# Patient Record
Sex: FEMALE | Race: WHITE | NOT HISPANIC OR LATINO | URBAN - METROPOLITAN AREA
[De-identification: names, ages, dates, MRNs, and addresses within clinical notes are randomized per-mention and may not be internally consistent; named-entity substitution may affect disease eponyms.]

---

## 2017-09-13 ENCOUNTER — EMERGENCY (EMERGENCY)
Facility: HOSPITAL | Age: 67
LOS: 1 days | Discharge: HOME | End: 2017-09-13
Attending: EMERGENCY MEDICINE | Admitting: EMERGENCY MEDICINE
Payer: MEDICARE

## 2017-09-13 VITALS
SYSTOLIC BLOOD PRESSURE: 135 MMHG | HEART RATE: 88 BPM | OXYGEN SATURATION: 99 % | DIASTOLIC BLOOD PRESSURE: 84 MMHG | RESPIRATION RATE: 16 BRPM | TEMPERATURE: 98 F

## 2017-09-13 VITALS — HEART RATE: 100 BPM | DIASTOLIC BLOOD PRESSURE: 92 MMHG | SYSTOLIC BLOOD PRESSURE: 148 MMHG | RESPIRATION RATE: 18 BRPM

## 2017-09-13 LAB
APPEARANCE UR: CLEAR — SIGNIFICANT CHANGE UP
BILIRUB UR-MCNC: NEGATIVE — SIGNIFICANT CHANGE UP
COLOR SPEC: SIGNIFICANT CHANGE UP
DIFF PNL FLD: NEGATIVE — SIGNIFICANT CHANGE UP
GLUCOSE UR QL: NEGATIVE — SIGNIFICANT CHANGE UP
KETONES UR-MCNC: NEGATIVE — SIGNIFICANT CHANGE UP
LEUKOCYTE ESTERASE UR-ACNC: ABNORMAL
NITRITE UR-MCNC: NEGATIVE — SIGNIFICANT CHANGE UP
PH UR: 6 — SIGNIFICANT CHANGE UP (ref 5–8)
PROT UR-MCNC: NEGATIVE — SIGNIFICANT CHANGE UP
RBC CASTS # UR COMP ASSIST: SIGNIFICANT CHANGE UP /HPF (ref 0–2)
SP GR SPEC: 1.01 — SIGNIFICANT CHANGE UP (ref 1.01–1.02)
UROBILINOGEN FLD QL: NEGATIVE — SIGNIFICANT CHANGE UP
WBC UR QL: SIGNIFICANT CHANGE UP /HPF (ref 0–5)

## 2017-09-13 PROCEDURE — 81001 URINALYSIS AUTO W/SCOPE: CPT

## 2017-09-13 PROCEDURE — 99284 EMERGENCY DEPT VISIT MOD MDM: CPT

## 2017-09-13 PROCEDURE — 99283 EMERGENCY DEPT VISIT LOW MDM: CPT | Mod: 25

## 2017-09-13 PROCEDURE — 87086 URINE CULTURE/COLONY COUNT: CPT

## 2017-09-13 PROCEDURE — 93005 ELECTROCARDIOGRAM TRACING: CPT

## 2017-09-13 PROCEDURE — 87186 SC STD MICRODIL/AGAR DIL: CPT

## 2017-09-13 RX ORDER — CEPHALEXIN 500 MG
500 CAPSULE ORAL ONCE
Qty: 0 | Refills: 0 | Status: COMPLETED | OUTPATIENT
Start: 2017-09-13 | End: 2017-09-13

## 2017-09-13 RX ORDER — CEPHALEXIN 500 MG
1 CAPSULE ORAL
Qty: 14 | Refills: 0
Start: 2017-09-13 | End: 2017-09-20

## 2017-09-13 RX ADMIN — Medication 500 MILLIGRAM(S): at 19:25

## 2017-09-13 NOTE — ED PROVIDER NOTE - PROGRESS NOTE DETAILS
pt continues to state she has not complaints and wishes to go back to her facility at this time. she is ambulatory and A&Ox3 without complaint. she is found to have bacteruria and will rx keflex for her. will print rx after providing one dose here, as patient lives in assisted living and will need rx filled by their pharmacy. nonemergent ambulance called. patient is stable for d/c back to facility at this time. -Anupama Oropeza PA-C

## 2017-09-13 NOTE — ED PROVIDER NOTE - ATTENDING CONTRIBUTION TO CARE
67F hx alzheimers, paranoid schizophrenia presents from [facility] via EMS.  As per EMS, [facility] NP found pt tachycardic to 120s and diaphoretic.  However, pt asymptomatic and vitals normal as per EMS.  In ED, pt has no complaints.  Denies CP, SOB, or any other symptoms. VSS. 67F hx alzheimers, paranoid schizophrenia presents from [facility] via EMS.  As per EMS, [facility] NP found pt tachycardic to 120s and diaphoretic.  However, pt asymptomatic and vitals normal as per EMS.  In ED, pt has no complaints.  Denies CP, SOB, or any other symptoms. VSS.  well-appearing.  OP wnl, cta b/l, s1s2, soft ndnt.  frequent trips to bathroom.  will obtain ua/ucx, reassess.  tbd

## 2017-09-13 NOTE — ED ADULT NURSE NOTE - OBJECTIVE STATEMENT
Pt is a 66 yo female sent here from the assisted living for tachycardia as per the nurse there. Pt denies any complaints at this time and does not know why she was sent here. Pt denies any CP or SOB no N/V/D no cough fever or chills. EMS reports pts HR in the low 100s. She reports feeling well and would like to go back home. Pt has pmh of alzheimers, dementia and paranoid schizophrenia.

## 2017-09-13 NOTE — ED PROVIDER NOTE - CARE PLAN
Principal Discharge DX:	UTI (urinary tract infection)  Instructions for follow-up, activity and diet:	1. Take Keflex 500mg twice daily for UTI   2. Hydrate thoroughly   3. Obtain reevaluation by primary physician this week.   4. Return to ED for any new or worsening symptoms.

## 2017-09-13 NOTE — ED PROVIDER NOTE - MEDICAL DECISION MAKING DETAILS
66 y/o F pmhx dementia (though A&Ox3 here) and schizophrenia BIBEMS from assisted living for episode of tachycardia found when taking vitals. PE unremarkable for tachycardia; patient denies any complaints at this time, stating she feels well. Will obtain basic labs, ekg, urinalysis and continue to reassess

## 2017-09-13 NOTE — ED PROVIDER NOTE - PLAN OF CARE
1. Take Keflex 500mg twice daily for UTI   2. Hydrate thoroughly   3. Obtain reevaluation by primary physician this week.   4. Return to ED for any new or worsening symptoms.

## 2017-09-13 NOTE — ED PROVIDER NOTE - OBJECTIVE STATEMENT
68 y/o F pmhx alzheimer's dementia, paranoid schizophrenia, presenting sent by her assisted living facility for episode of tachycardia. Pt states that she feels well, does not have any complaints and 'does not know why they asked her to come in.' Reports they were concerned when they checked her vital signs that her 'heart rate was in the 120s' and called the ambulance. Pt denies any chest pain, shortness of breath, recent illness, fever, chills, dysuria, abdominal pain, nausea, vomiting, diarrhea or any other concerns. States that she wants to go home at this time.

## 2017-09-16 NOTE — ED POST DISCHARGE NOTE - RESULT SUMMARY
UCx + 50-99,000 CFU/mL E.Coli; Pt given rx for keflex and culture is pansensitive to cephalosporins. - Corinne Fischer PA-C

## 2018-02-16 ENCOUNTER — INPATIENT (INPATIENT)
Facility: HOSPITAL | Age: 68
LOS: 5 days | Discharge: HOSPICE | DRG: 871 | End: 2018-02-22
Attending: INTERNAL MEDICINE | Admitting: INTERNAL MEDICINE
Payer: MEDICARE

## 2018-02-16 VITALS
DIASTOLIC BLOOD PRESSURE: 79 MMHG | TEMPERATURE: 98 F | OXYGEN SATURATION: 99 % | SYSTOLIC BLOOD PRESSURE: 131 MMHG | RESPIRATION RATE: 20 BRPM | HEART RATE: 101 BPM

## 2018-02-16 DIAGNOSIS — J96.01 ACUTE RESPIRATORY FAILURE WITH HYPOXIA: ICD-10-CM

## 2018-02-16 DIAGNOSIS — E03.9 HYPOTHYROIDISM, UNSPECIFIED: ICD-10-CM

## 2018-02-16 DIAGNOSIS — F20.0 PARANOID SCHIZOPHRENIA: ICD-10-CM

## 2018-02-16 DIAGNOSIS — R06.03 ACUTE RESPIRATORY DISTRESS: ICD-10-CM

## 2018-02-16 DIAGNOSIS — J20.5 ACUTE BRONCHITIS DUE TO RESPIRATORY SYNCYTIAL VIRUS: ICD-10-CM

## 2018-02-16 LAB
ALBUMIN SERPL ELPH-MCNC: 4 G/DL — SIGNIFICANT CHANGE UP (ref 3.3–5)
ALP SERPL-CCNC: 88 U/L — SIGNIFICANT CHANGE UP (ref 40–120)
ALT FLD-CCNC: 19 U/L RC — SIGNIFICANT CHANGE UP (ref 10–45)
AMPHET UR-MCNC: NEGATIVE — SIGNIFICANT CHANGE UP
ANION GAP SERPL CALC-SCNC: 16 MMOL/L — SIGNIFICANT CHANGE UP (ref 5–17)
APAP SERPL-MCNC: <15 UG/ML — SIGNIFICANT CHANGE UP (ref 10–30)
APPEARANCE UR: CLEAR — SIGNIFICANT CHANGE UP
APTT BLD: 30 SEC — SIGNIFICANT CHANGE UP (ref 27.5–37.4)
AST SERPL-CCNC: 21 U/L — SIGNIFICANT CHANGE UP (ref 10–40)
BARBITURATES UR SCN-MCNC: NEGATIVE — SIGNIFICANT CHANGE UP
BASE EXCESS BLDV CALC-SCNC: -0.5 MMOL/L — SIGNIFICANT CHANGE UP (ref -2–2)
BASOPHILS # BLD AUTO: 0 K/UL — SIGNIFICANT CHANGE UP (ref 0–0.2)
BASOPHILS NFR BLD AUTO: 0.6 % — SIGNIFICANT CHANGE UP (ref 0–2)
BENZODIAZ UR-MCNC: NEGATIVE — SIGNIFICANT CHANGE UP
BILIRUB SERPL-MCNC: 0.2 MG/DL — SIGNIFICANT CHANGE UP (ref 0.2–1.2)
BILIRUB UR-MCNC: NEGATIVE — SIGNIFICANT CHANGE UP
BUN SERPL-MCNC: 15 MG/DL — SIGNIFICANT CHANGE UP (ref 7–23)
CA-I SERPL-SCNC: 1.21 MMOL/L — SIGNIFICANT CHANGE UP (ref 1.12–1.3)
CALCIUM SERPL-MCNC: 9 MG/DL — SIGNIFICANT CHANGE UP (ref 8.4–10.5)
CHLORIDE BLDV-SCNC: 107 MMOL/L — SIGNIFICANT CHANGE UP (ref 96–108)
CHLORIDE SERPL-SCNC: 104 MMOL/L — SIGNIFICANT CHANGE UP (ref 96–108)
CO2 BLDV-SCNC: 24 MMOL/L — SIGNIFICANT CHANGE UP (ref 22–30)
CO2 SERPL-SCNC: 21 MMOL/L — LOW (ref 22–31)
COCAINE METAB.OTHER UR-MCNC: NEGATIVE — SIGNIFICANT CHANGE UP
COLOR SPEC: SIGNIFICANT CHANGE UP
CREAT SERPL-MCNC: 0.8 MG/DL — SIGNIFICANT CHANGE UP (ref 0.5–1.3)
DIFF PNL FLD: NEGATIVE — SIGNIFICANT CHANGE UP
EOSINOPHIL # BLD AUTO: 0 K/UL — SIGNIFICANT CHANGE UP (ref 0–0.5)
EOSINOPHIL NFR BLD AUTO: 0.4 % — SIGNIFICANT CHANGE UP (ref 0–6)
GAS PNL BLDV: 141 MMOL/L — SIGNIFICANT CHANGE UP (ref 136–145)
GAS PNL BLDV: SIGNIFICANT CHANGE UP
GAS PNL BLDV: SIGNIFICANT CHANGE UP
GLUCOSE BLDV-MCNC: 111 MG/DL — HIGH (ref 70–99)
GLUCOSE SERPL-MCNC: 113 MG/DL — HIGH (ref 70–99)
GLUCOSE UR QL: NEGATIVE — SIGNIFICANT CHANGE UP
HCO3 BLDV-SCNC: 23 MMOL/L — SIGNIFICANT CHANGE UP (ref 21–29)
HCT VFR BLD CALC: 41.5 % — SIGNIFICANT CHANGE UP (ref 34.5–45)
HCT VFR BLDA CALC: 42 % — SIGNIFICANT CHANGE UP (ref 39–50)
HGB BLD CALC-MCNC: 13.8 G/DL — SIGNIFICANT CHANGE UP (ref 11.5–15.5)
HGB BLD-MCNC: 13.7 G/DL — SIGNIFICANT CHANGE UP (ref 11.5–15.5)
INR BLD: 1.05 RATIO — SIGNIFICANT CHANGE UP (ref 0.88–1.16)
KETONES UR-MCNC: NEGATIVE — SIGNIFICANT CHANGE UP
LACTATE BLDV-MCNC: 1.6 MMOL/L — SIGNIFICANT CHANGE UP (ref 0.7–2)
LEUKOCYTE ESTERASE UR-ACNC: NEGATIVE — SIGNIFICANT CHANGE UP
LYMPHOCYTES # BLD AUTO: 0.7 K/UL — LOW (ref 1–3.3)
LYMPHOCYTES # BLD AUTO: 11.2 % — LOW (ref 13–44)
MCHC RBC-ENTMCNC: 30.8 PG — SIGNIFICANT CHANGE UP (ref 27–34)
MCHC RBC-ENTMCNC: 33.1 GM/DL — SIGNIFICANT CHANGE UP (ref 32–36)
MCV RBC AUTO: 92.9 FL — SIGNIFICANT CHANGE UP (ref 80–100)
METHADONE UR-MCNC: NEGATIVE — SIGNIFICANT CHANGE UP
MONOCYTES # BLD AUTO: 0.5 K/UL — SIGNIFICANT CHANGE UP (ref 0–0.9)
MONOCYTES NFR BLD AUTO: 8 % — SIGNIFICANT CHANGE UP (ref 2–14)
NEUTROPHILS # BLD AUTO: 5 K/UL — SIGNIFICANT CHANGE UP (ref 1.8–7.4)
NEUTROPHILS NFR BLD AUTO: 79.7 % — HIGH (ref 43–77)
NITRITE UR-MCNC: NEGATIVE — SIGNIFICANT CHANGE UP
OPIATES UR-MCNC: NEGATIVE — SIGNIFICANT CHANGE UP
OXYCODONE UR-MCNC: NEGATIVE — SIGNIFICANT CHANGE UP
PCO2 BLDV: 38 MMHG — SIGNIFICANT CHANGE UP (ref 35–50)
PCP SPEC-MCNC: SIGNIFICANT CHANGE UP
PCP UR-MCNC: NEGATIVE — SIGNIFICANT CHANGE UP
PH BLDV: 7.41 — SIGNIFICANT CHANGE UP (ref 7.35–7.45)
PH UR: 6.5 — SIGNIFICANT CHANGE UP (ref 5–8)
PLATELET # BLD AUTO: 253 K/UL — SIGNIFICANT CHANGE UP (ref 150–400)
PO2 BLDV: SIGNIFICANT CHANGE UP MMHG (ref 25–45)
POTASSIUM BLDV-SCNC: 3.9 MMOL/L — SIGNIFICANT CHANGE UP (ref 3.5–5)
POTASSIUM SERPL-MCNC: 3.8 MMOL/L — SIGNIFICANT CHANGE UP (ref 3.5–5.3)
POTASSIUM SERPL-SCNC: 3.8 MMOL/L — SIGNIFICANT CHANGE UP (ref 3.5–5.3)
PROT SERPL-MCNC: 7.4 G/DL — SIGNIFICANT CHANGE UP (ref 6–8.3)
PROT UR-MCNC: NEGATIVE — SIGNIFICANT CHANGE UP
PROTHROM AB SERPL-ACNC: 11.5 SEC — SIGNIFICANT CHANGE UP (ref 9.8–12.7)
RAPID RVP RESULT: DETECTED
RBC # BLD: 4.46 M/UL — SIGNIFICANT CHANGE UP (ref 3.8–5.2)
RBC # FLD: 13 % — SIGNIFICANT CHANGE UP (ref 10.3–14.5)
RSV RNA SPEC QL NAA+PROBE: DETECTED
SALICYLATES SERPL-MCNC: <2 MG/DL — LOW (ref 15–30)
SAO2 % BLDV: 89 % — HIGH (ref 67–88)
SODIUM SERPL-SCNC: 141 MMOL/L — SIGNIFICANT CHANGE UP (ref 135–145)
SP GR SPEC: 1.01 — SIGNIFICANT CHANGE UP (ref 1.01–1.02)
T3 SERPL-MCNC: 82 NG/DL — SIGNIFICANT CHANGE UP (ref 80–200)
T4 AB SER-ACNC: 8.3 UG/DL — SIGNIFICANT CHANGE UP (ref 4.6–12)
THC UR QL: NEGATIVE — SIGNIFICANT CHANGE UP
TROPONIN T SERPL-MCNC: <0.01 NG/ML — SIGNIFICANT CHANGE UP (ref 0–0.06)
TSH SERPL-MCNC: 2.68 UIU/ML — SIGNIFICANT CHANGE UP (ref 0.27–4.2)
UROBILINOGEN FLD QL: NEGATIVE — SIGNIFICANT CHANGE UP
WBC # BLD: 6.3 K/UL — SIGNIFICANT CHANGE UP (ref 3.8–10.5)
WBC # FLD AUTO: 6.3 K/UL — SIGNIFICANT CHANGE UP (ref 3.8–10.5)

## 2018-02-16 PROCEDURE — 99291 CRITICAL CARE FIRST HOUR: CPT | Mod: 25,GC

## 2018-02-16 PROCEDURE — 93010 ELECTROCARDIOGRAM REPORT: CPT

## 2018-02-16 PROCEDURE — 71045 X-RAY EXAM CHEST 1 VIEW: CPT | Mod: 26

## 2018-02-16 RX ORDER — HEPARIN SODIUM 5000 [USP'U]/ML
5000 INJECTION INTRAVENOUS; SUBCUTANEOUS EVERY 8 HOURS
Qty: 0 | Refills: 0 | Status: DISCONTINUED | OUTPATIENT
Start: 2018-02-16 | End: 2018-02-22

## 2018-02-16 RX ORDER — FAMOTIDINE 10 MG/ML
20 INJECTION INTRAVENOUS DAILY
Qty: 0 | Refills: 0 | Status: DISCONTINUED | OUTPATIENT
Start: 2018-02-16 | End: 2018-02-22

## 2018-02-16 RX ORDER — RANITIDINE HYDROCHLORIDE 150 MG/1
1 TABLET, FILM COATED ORAL
Qty: 0 | Refills: 0 | COMMUNITY

## 2018-02-16 RX ORDER — SODIUM CHLORIDE 9 MG/ML
3 INJECTION INTRAMUSCULAR; INTRAVENOUS; SUBCUTANEOUS ONCE
Qty: 0 | Refills: 0 | Status: COMPLETED | OUTPATIENT
Start: 2018-02-16 | End: 2018-02-16

## 2018-02-16 RX ORDER — LEVOTHYROXINE SODIUM 125 MCG
50 TABLET ORAL DAILY
Qty: 0 | Refills: 0 | Status: DISCONTINUED | OUTPATIENT
Start: 2018-02-16 | End: 2018-02-22

## 2018-02-16 RX ORDER — RISPERIDONE 4 MG/1
1 TABLET ORAL
Qty: 0 | Refills: 0 | Status: DISCONTINUED | OUTPATIENT
Start: 2018-02-16 | End: 2018-02-22

## 2018-02-16 RX ORDER — BUDESONIDE, MICRONIZED 100 %
0.5 POWDER (GRAM) MISCELLANEOUS
Qty: 0 | Refills: 0 | Status: DISCONTINUED | OUTPATIENT
Start: 2018-02-16 | End: 2018-02-22

## 2018-02-16 RX ORDER — ACETAMINOPHEN 500 MG
650 TABLET ORAL EVERY 6 HOURS
Qty: 0 | Refills: 0 | Status: DISCONTINUED | OUTPATIENT
Start: 2018-02-16 | End: 2018-02-22

## 2018-02-16 RX ORDER — IPRATROPIUM/ALBUTEROL SULFATE 18-103MCG
3 AEROSOL WITH ADAPTER (GRAM) INHALATION EVERY 6 HOURS
Qty: 0 | Refills: 0 | Status: DISCONTINUED | OUTPATIENT
Start: 2018-02-16 | End: 2018-02-22

## 2018-02-16 RX ADMIN — Medication 3 MILLILITER(S): at 23:46

## 2018-02-16 RX ADMIN — Medication 0.5 MILLIGRAM(S): at 21:32

## 2018-02-16 RX ADMIN — FAMOTIDINE 20 MILLIGRAM(S): 10 INJECTION INTRAVENOUS at 21:31

## 2018-02-16 RX ADMIN — SODIUM CHLORIDE 3 MILLILITER(S): 9 INJECTION INTRAMUSCULAR; INTRAVENOUS; SUBCUTANEOUS at 14:09

## 2018-02-16 RX ADMIN — HEPARIN SODIUM 5000 UNIT(S): 5000 INJECTION INTRAVENOUS; SUBCUTANEOUS at 21:30

## 2018-02-16 RX ADMIN — RISPERIDONE 1 MILLIGRAM(S): 4 TABLET ORAL at 21:33

## 2018-02-16 RX ADMIN — Medication 1 MILLIGRAM(S): at 21:31

## 2018-02-16 RX ADMIN — Medication 20 MILLIGRAM(S): at 21:29

## 2018-02-16 NOTE — ED PROVIDER NOTE - CARE PLAN
Principal Discharge DX:	Tachypnea Principal Discharge DX:	Respiratory distress Principal Discharge DX:	Respiratory distress  Secondary Diagnosis:	RSV infection

## 2018-02-16 NOTE — H&P ADULT - RS GEN PE MLT RESP DETAILS PC
airway patent/no chest wall tenderness/good air movement/no rales/breath sounds equal/no intercostal retractions/rhonchi/no wheezes

## 2018-02-16 NOTE — H&P ADULT - PROBLEM SELECTOR PLAN 2
RVP positive for RSV, not influenza  - d/c Tamiglu and treatment as above  - Droplet, contact isolation

## 2018-02-16 NOTE — H&P ADULT - HISTORY OF PRESENT ILLNESS
(history obtained from transfer note, patient and HCP-Tomas)-The patient is a 68 F (poor historian) with h/o Alzheimer's dementia, paranoid schizophrenia, hypothyroidism, GI bleed, PVD, ASHD sent from [facility] because she has been having nonproductive cough with SOB for the last few days. No c/o fever, chest pain.   As per HCP-Tmoas she visited her on 2/8 at [facility] and she was in her regular state of health. On 2/13/18 she was started on Tamiflu, likely for her symptoms but no documentation of Influenza.,

## 2018-02-16 NOTE — ED PROVIDER NOTE - MEDICAL DECISION MAKING DETAILS
68y Female PMH alzheimer's dementia, paranoid schizophrenia, presenting sent by her assisted living facility ([facility]) for tachypnea, no other symptoms, will assess RVP, labs with cardiac enzymes and D-dimer to r/o PE, Noninvasive positive pressure ventilation, reassess 68y Female PMH alzheimer's dementia, paranoid schizophrenia, presenting sent by her assisted living facility ([facility]) for tachypnea, no other symptoms, will assess RVP, labs with cardiac enzymes and D-dimer to r/o PE, Noninvasive positive pressure ventilation, reassess    ELIZABETH Harper MD: Pt is a 69 y/o Female with PMH alzheimer's dementia, paranoid schizophrenia, presenting sent by her assisted living facility ([facility]) for respiratory distress. Patient without complaints at this time, however, is only A+Ox1 (person) and appears to be a poor historian, not sure why she is here. Patient was placed on Tamiflu on 2/13, no confirmed influenza per F paperwork. Pt tachypneic with RR in 40's upon arrival, coarse breath sounds b/l, placed on BiPAP on arrival for respiratory distress.  DDx: influenza, pna, CHF, PE, tox  Plan: basic labs, RVP, bcx, CXR, d-dimer to r/o PE, tox screen, cardiac labs, ekg, admission

## 2018-02-16 NOTE — H&P ADULT - PMH
Alzheimer's dementia    Hypothyroid    Paranoid schizophrenia    Upper GI bleed    UTI (urinary tract infection)

## 2018-02-16 NOTE — ED PROVIDER NOTE - OBJECTIVE STATEMENT
68y Female PMH alzheimer's dementia, paranoid schizophrenia, presenting sent by her assisted living facility ([facility]) for tachypnea. Patient without complaints at this time. 68y Female PMH alzheimer's dementia, paranoid schizophrenia, presenting sent by her assisted living facility ([facility]) for tachypnea. Patient without complaints at this time. Patient poor historian, not sure why she is here. Patient was placed on Tamiflu on 2/13, no confirmed influenza so far. Reported to appear tachypneic with increased work of breathing.

## 2018-02-16 NOTE — ED ADULT NURSE NOTE - OBJECTIVE STATEMENT
69 yo female with PMH Alzheimer's dementia, paranoid schizophrenia brought to ED by EMS from [facility] for tachypnea. Per EMS, on arrival to [place] patient was tachypneic with 60 breaths per minute, symtoms improved with oxygen administration. On arrival to ED, patient tachypneic to ~40 BPM. O2 sat 92% on room air improved to 97% on 2L NC. Patient started on BiPap. Patient A&Ox4, no c/o pain. Lungs clear. Abdomen is soft, nondistended, nontender to palpation. Skin intact. No rash. Moving all extremities strong.

## 2018-02-16 NOTE — H&P ADULT - PROBLEM SELECTOR PLAN 4
Clam now, no behavioral issues.  - May need enhance supervision  - c/w Risperidone and ativan at home doses

## 2018-02-16 NOTE — H&P ADULT - PROBLEM SELECTOR PLAN 1
Afebrile, BP stable. Not septic, O2 sat in ER was 89% in RA. On Bipap now. CXR negative for Pna/CHF  - O2 supplement, Bipap at night  - bronchodilators, IV solumedrol 20mg q 8 hrs for 2-3 days  - If cough get worse, will do CT chest to r/o Pneumonia Afebrile, BP stable. Not septic, O2 sat in ER was 89% in RA. VBG 7.4/38/23/89%. On Bipap now. CXR negative for Pna/CHF  - O2 supplement, Bipap at night  - bronchodilators, IV solumedrol 20mg q 8 hrs for 2-3 days  - If cough get worse, will do CT chest to r/o Pneumonia

## 2018-02-16 NOTE — ED PROVIDER NOTE - PHYSICAL EXAMINATION
PE: CONSTITUTIONAL: Nontoxic, in no apparent distress, but with significant tachypnea ENMT: Airway patent, nasal mucosa clear, mouth with normal mucosa. HEAD: NCAT EYES: PERRL, EOMI bilaterally CARDIAC: RRR, no m/r/g, no pedal edema RESPIRATORY: Shallow breaths with decreased breaths sounds at bilat bases, tachypnea with rate of 40s, no obvious rales GI: Abdomen non-distended, non-tender MSK: Spine appears normal, range of motion is not limited, no muscle/joint tenderness NEURO: Poor historian but able to answer simple questions, gait intact SKIN: Skin tone normal in color, warm and dry. No evidence of rash.

## 2018-02-16 NOTE — H&P ADULT - ASSESSMENT
68 F (poor historian) with h/o Alzheimer's dementia, paranoid schizophrenia, hypothyroidism, GI bleed, PVD, ASHD sent from [facility] because she has been having nonproductive cough with SOB for the last few days. No c/o fever, chest pain.   In ER she remained afebrile, BP has bene satble, , her O2 sat was 89%, she was Put on Bipap. CXR showed no consolidation or Pneumonia/CHF.     WBC 4.9 68 F (poor historian) with h/o Alzheimer's dementia, paranoid schizophrenia, hypothyroidism, GI bleed, PVD, ASHD sent from [facility]) [facility]isted Living because she has been having nonproductive cough with SOB for the last few days. No c/o fever, chest pain.   In ER she remained afebrile, BP has bene stable, , her O2 sat was 89%, VBG 7.4/38/23/89%. she was Put on Bipap. CXR showed no consolidation or Pneumonia/CHF.     WBC 4.9

## 2018-02-16 NOTE — H&P ADULT - NEUROLOGICAL DETAILS
deep reflexes intact/alert and oriented x 3/no focal deficit/responds to verbal commands/sensation intact/cranial nerves intact/superficial reflexes intact/normal strength

## 2018-02-17 LAB
ANION GAP SERPL CALC-SCNC: 13 MMOL/L — SIGNIFICANT CHANGE UP (ref 5–17)
BASOPHILS # BLD AUTO: 0.01 K/UL — SIGNIFICANT CHANGE UP (ref 0–0.2)
BASOPHILS NFR BLD AUTO: 0.2 % — SIGNIFICANT CHANGE UP (ref 0–2)
BUN SERPL-MCNC: 18 MG/DL — SIGNIFICANT CHANGE UP (ref 7–23)
CALCIUM SERPL-MCNC: 9.2 MG/DL — SIGNIFICANT CHANGE UP (ref 8.4–10.5)
CHLORIDE SERPL-SCNC: 109 MMOL/L — HIGH (ref 96–108)
CO2 SERPL-SCNC: 20 MMOL/L — LOW (ref 22–31)
CREAT SERPL-MCNC: 0.82 MG/DL — SIGNIFICANT CHANGE UP (ref 0.5–1.3)
CULTURE RESULTS: NO GROWTH — SIGNIFICANT CHANGE UP
EOSINOPHIL # BLD AUTO: 0 K/UL — SIGNIFICANT CHANGE UP (ref 0–0.5)
EOSINOPHIL NFR BLD AUTO: 0 % — SIGNIFICANT CHANGE UP (ref 0–6)
GAS PNL BLDA: SIGNIFICANT CHANGE UP
GLUCOSE SERPL-MCNC: 134 MG/DL — HIGH (ref 70–99)
HCT VFR BLD CALC: 37.3 % — SIGNIFICANT CHANGE UP (ref 34.5–45)
HGB BLD-MCNC: 12.5 G/DL — SIGNIFICANT CHANGE UP (ref 11.5–15.5)
IMM GRANULOCYTES NFR BLD AUTO: 0.2 % — SIGNIFICANT CHANGE UP (ref 0–1.5)
LYMPHOCYTES # BLD AUTO: 0.75 K/UL — LOW (ref 1–3.3)
LYMPHOCYTES # BLD AUTO: 12.7 % — LOW (ref 13–44)
MCHC RBC-ENTMCNC: 30.2 PG — SIGNIFICANT CHANGE UP (ref 27–34)
MCHC RBC-ENTMCNC: 33.5 GM/DL — SIGNIFICANT CHANGE UP (ref 32–36)
MCV RBC AUTO: 90.1 FL — SIGNIFICANT CHANGE UP (ref 80–100)
MONOCYTES # BLD AUTO: 0.27 K/UL — SIGNIFICANT CHANGE UP (ref 0–0.9)
MONOCYTES NFR BLD AUTO: 4.6 % — SIGNIFICANT CHANGE UP (ref 2–14)
NEUTROPHILS # BLD AUTO: 4.87 K/UL — SIGNIFICANT CHANGE UP (ref 1.8–7.4)
NEUTROPHILS NFR BLD AUTO: 82.3 % — HIGH (ref 43–77)
PLATELET # BLD AUTO: 238 K/UL — SIGNIFICANT CHANGE UP (ref 150–400)
POTASSIUM SERPL-MCNC: 4.3 MMOL/L — SIGNIFICANT CHANGE UP (ref 3.5–5.3)
POTASSIUM SERPL-SCNC: 4.3 MMOL/L — SIGNIFICANT CHANGE UP (ref 3.5–5.3)
RBC # BLD: 4.14 M/UL — SIGNIFICANT CHANGE UP (ref 3.8–5.2)
RBC # FLD: 15.1 % — HIGH (ref 10.3–14.5)
SODIUM SERPL-SCNC: 142 MMOL/L — SIGNIFICANT CHANGE UP (ref 135–145)
SPECIMEN SOURCE: SIGNIFICANT CHANGE UP
WBC # BLD: 5.91 K/UL — SIGNIFICANT CHANGE UP (ref 3.8–10.5)
WBC # FLD AUTO: 5.91 K/UL — SIGNIFICANT CHANGE UP (ref 3.8–10.5)

## 2018-02-17 RX ADMIN — HEPARIN SODIUM 5000 UNIT(S): 5000 INJECTION INTRAVENOUS; SUBCUTANEOUS at 20:57

## 2018-02-17 RX ADMIN — Medication 20 MILLIGRAM(S): at 20:58

## 2018-02-17 RX ADMIN — Medication 20 MILLIGRAM(S): at 06:41

## 2018-02-17 RX ADMIN — RISPERIDONE 1 MILLIGRAM(S): 4 TABLET ORAL at 21:31

## 2018-02-17 RX ADMIN — Medication 3 MILLILITER(S): at 06:40

## 2018-02-17 RX ADMIN — FAMOTIDINE 20 MILLIGRAM(S): 10 INJECTION INTRAVENOUS at 13:15

## 2018-02-17 RX ADMIN — RISPERIDONE 1 MILLIGRAM(S): 4 TABLET ORAL at 08:44

## 2018-02-17 RX ADMIN — Medication 50 MICROGRAM(S): at 06:41

## 2018-02-17 RX ADMIN — Medication 0.5 MILLIGRAM(S): at 06:40

## 2018-02-17 RX ADMIN — HEPARIN SODIUM 5000 UNIT(S): 5000 INJECTION INTRAVENOUS; SUBCUTANEOUS at 06:40

## 2018-02-17 RX ADMIN — HEPARIN SODIUM 5000 UNIT(S): 5000 INJECTION INTRAVENOUS; SUBCUTANEOUS at 13:16

## 2018-02-17 RX ADMIN — Medication 1 MILLIGRAM(S): at 20:57

## 2018-02-17 RX ADMIN — Medication 20 MILLIGRAM(S): at 13:18

## 2018-02-17 RX ADMIN — Medication 3 MILLILITER(S): at 18:58

## 2018-02-17 RX ADMIN — Medication 0.5 MILLIGRAM(S): at 18:58

## 2018-02-17 RX ADMIN — Medication 3 MILLILITER(S): at 13:21

## 2018-02-18 LAB
ANION GAP SERPL CALC-SCNC: 14 MMOL/L — SIGNIFICANT CHANGE UP (ref 5–17)
BASE EXCESS BLDA CALC-SCNC: -1.6 MMOL/L — SIGNIFICANT CHANGE UP (ref -2–2)
BUN SERPL-MCNC: 21 MG/DL — SIGNIFICANT CHANGE UP (ref 7–23)
CALCIUM SERPL-MCNC: 9.2 MG/DL — SIGNIFICANT CHANGE UP (ref 8.4–10.5)
CHLORIDE SERPL-SCNC: 109 MMOL/L — HIGH (ref 96–108)
CO2 BLDA-SCNC: 23 MMOL/L — SIGNIFICANT CHANGE UP (ref 22–30)
CO2 SERPL-SCNC: 21 MMOL/L — LOW (ref 22–31)
CREAT SERPL-MCNC: 0.8 MG/DL — SIGNIFICANT CHANGE UP (ref 0.5–1.3)
GAS PNL BLDA: SIGNIFICANT CHANGE UP
GLUCOSE SERPL-MCNC: 131 MG/DL — HIGH (ref 70–99)
HCO3 BLDA-SCNC: 22 MMOL/L — SIGNIFICANT CHANGE UP (ref 21–29)
HOROWITZ INDEX BLDA+IHG-RTO: 35 — SIGNIFICANT CHANGE UP
PCO2 BLDA: 35 MMHG — SIGNIFICANT CHANGE UP (ref 32–46)
PH BLDA: 7.42 — SIGNIFICANT CHANGE UP (ref 7.35–7.45)
PO2 BLDA: 114 MMHG — HIGH (ref 74–108)
POTASSIUM SERPL-MCNC: 4.3 MMOL/L — SIGNIFICANT CHANGE UP (ref 3.5–5.3)
POTASSIUM SERPL-SCNC: 4.3 MMOL/L — SIGNIFICANT CHANGE UP (ref 3.5–5.3)
SAO2 % BLDA: 98 % — HIGH (ref 92–96)
SODIUM SERPL-SCNC: 144 MMOL/L — SIGNIFICANT CHANGE UP (ref 135–145)

## 2018-02-18 PROCEDURE — 71045 X-RAY EXAM CHEST 1 VIEW: CPT | Mod: 26

## 2018-02-18 RX ADMIN — Medication 3 MILLILITER(S): at 00:00

## 2018-02-18 RX ADMIN — Medication 1 MILLIGRAM(S): at 18:43

## 2018-02-18 RX ADMIN — Medication 3 MILLILITER(S): at 12:57

## 2018-02-18 RX ADMIN — Medication 50 MICROGRAM(S): at 05:58

## 2018-02-18 RX ADMIN — Medication 1 MILLIGRAM(S): at 23:54

## 2018-02-18 RX ADMIN — Medication 3 MILLILITER(S): at 05:58

## 2018-02-18 RX ADMIN — Medication 20 MILLIGRAM(S): at 13:27

## 2018-02-18 RX ADMIN — HEPARIN SODIUM 5000 UNIT(S): 5000 INJECTION INTRAVENOUS; SUBCUTANEOUS at 21:55

## 2018-02-18 RX ADMIN — Medication 0.5 MILLIGRAM(S): at 05:58

## 2018-02-18 RX ADMIN — HEPARIN SODIUM 5000 UNIT(S): 5000 INJECTION INTRAVENOUS; SUBCUTANEOUS at 12:59

## 2018-02-18 RX ADMIN — FAMOTIDINE 20 MILLIGRAM(S): 10 INJECTION INTRAVENOUS at 12:55

## 2018-02-18 RX ADMIN — Medication 3 MILLILITER(S): at 18:39

## 2018-02-18 RX ADMIN — Medication 20 MILLIGRAM(S): at 21:55

## 2018-02-18 RX ADMIN — Medication 20 MILLIGRAM(S): at 05:58

## 2018-02-18 RX ADMIN — RISPERIDONE 1 MILLIGRAM(S): 4 TABLET ORAL at 21:54

## 2018-02-18 RX ADMIN — HEPARIN SODIUM 5000 UNIT(S): 5000 INJECTION INTRAVENOUS; SUBCUTANEOUS at 05:58

## 2018-02-18 RX ADMIN — Medication 3 MILLILITER(S): at 23:10

## 2018-02-18 RX ADMIN — Medication 0.5 MILLIGRAM(S): at 18:39

## 2018-02-18 RX ADMIN — RISPERIDONE 1 MILLIGRAM(S): 4 TABLET ORAL at 09:05

## 2018-02-18 NOTE — CHART NOTE - NSCHARTNOTEFT_GEN_A_CORE
CHIEF COMPLAINT: Tachypnea    SUBJECTIVE / OVERNIGHT EVENTS: Notified by RN that patient is short of breath. Pt is tachypneic denies chest pain, headache, palpitations    Vital Signs Last 24 Hrs  T(C): 36.8 (18 Feb 2018 21:44), Max: 36.9 (18 Feb 2018 08:14)  T(F): 98.3 (18 Feb 2018 21:44), Max: 98.4 (18 Feb 2018 08:14)  HR: 103 (18 Feb 2018 21:44) (80 - 104)  BP: 162/79 (18 Feb 2018 21:44) (106/66 - 162/79)  BP(mean): --  RR: 44 (18 Feb 2018 23:35) (18 - 44)  SpO2: 95% (18 Feb 2018 21:44) (94% - 96%)    REVIEW OF SYSTEMS:  Constitutional: No fever, fatigue or weight loss.  Skin: No rash.  Eyes: No recent vision problems or eye pain.  ENT: No congestion, ear pain, or sore throat.  Endocrine: No thyroid problems.  Cardiovascular: No chest pain or palpation.  Respiratory: shortness of breath  Gastrointestinal: No abdominal pain, nausea, vomiting, or diarrhea.  Genitourinary: No dysuria.  Musculoskeletal: No joint swelling.  Neurologic: No headache.    I&O's Summary    17 Feb 2018 07:01  -  18 Feb 2018 07:00  --------------------------------------------------------  IN: 640 mL / OUT: 200 mL / NET: 440 mL    18 Feb 2018 07:01  -  18 Feb 2018 23:53  --------------------------------------------------------  IN: 1020 mL / OUT: 200 mL / NET: 820 mL      LABS:                        12.5   5.91  )-----------( 238      ( 17 Feb 2018 08:56 )             37.3     02-18    144  |  109<H>  |  21  ----------------------------<  131<H>  4.3   |  21<L>  |  0.80    Ca    9.2      18 Feb 2018 08:29        RADIOLOGY & ADDITIONAL TESTS:  < from: CT Head w/o Cont (05.08.13 @ 01:02) >    IMPRESSION:  Motion degraded study. No obvious acute intracranial   hemorrhage, large cortical infarct or mass effect. If there is continued   clinical suspicion and when patient is able to cooperate after receiving   adequate sedation, close follow-up or MRI may be obtained for further   evaluation.    < end of copied text >  < from: Xray Chest 1 View AP/PA (02.16.18 @ 16:26) >    IMPRESSION:   Bibasilar atelectasis. There is no focal consolidation.    < end of copied text >        MEDICATIONS  (STANDING):  ALBUTerol/ipratropium for Nebulization 3 milliLiter(s) Nebulizer every 6 hours  buDESOnide   0.5 milliGRAM(s) Respule 0.5 milliGRAM(s) Inhalation two times a day  famotidine    Tablet 20 milliGRAM(s) Oral daily  heparin  Injectable 5000 Unit(s) SubCutaneous every 8 hours  levothyroxine 50 MICROGram(s) Oral daily  LORazepam     Tablet 1 milliGRAM(s) Oral at bedtime  LORazepam   Injectable 1 milliGRAM(s) IntraMuscular once  methylPREDNISolone sodium succinate Injectable 20 milliGRAM(s) IV Push three times a day  risperiDONE   Solution 1 milliGRAM(s) Oral two times a day    MEDICATIONS  (PRN):  acetaminophen   Tablet 650 milliGRAM(s) Oral every 6 hours PRN For Temp greater than 38.5 C (101.3 F)      PHYSICAL EXAM:  GENERAL: awake alert, well-developed non-toxic in appearance  NEUROLOGY/PSYCHIATRIC: awake alert and oriented non-focal deficits   CARDIOVASCULAR: Regular rate and rhythm; No murmurs, rubs, or gallops  RESPIRATORY: diminished breath sounds accessory muscle use; No wheeze  GASTROINTESTINAL: Soft, Nontender, Nondistended; Bowel sounds present  EXTREMITIES:  2+ Peripheral Pulses, No clubbing, cyanosis, or edema  SKIN: No rashes or lesions  GENITOURINARY: Non distended bladder    PMH/PSH  Hypothyroid  Upper GI bleed  Paranoid schizophrenia  UTI (urinary tract infection)  Alzheimer's dementia  Respiratory distress  Tachypnea  No significant past surgical history        ASSESSMENT/PLAN:   HPI:  (history obtained from transfer note, patient and HCP-Tomas)-The patient is a 68 F (poor historian) with h/o Alzheimer's dementia, paranoid schizophrenia, hypothyroidism, GI bleed, PVD, ASHD sent from [facility] because she has been having nonproductive cough with SOB for the last few days. (16 Feb 2018 18:09) Tachypnea    Plan  AVAPS  Ativan 1mg IV x1 dose  MICU consult ( called [phone])  ABG  chest x-ray      Care Discussed with Dr. Claire medical attending at [phone]

## 2018-02-19 DIAGNOSIS — R06.03 ACUTE RESPIRATORY DISTRESS: ICD-10-CM

## 2018-02-19 LAB
ANION GAP SERPL CALC-SCNC: 13 MMOL/L — SIGNIFICANT CHANGE UP (ref 5–17)
ANION GAP SERPL CALC-SCNC: 16 MMOL/L — SIGNIFICANT CHANGE UP (ref 5–17)
BUN SERPL-MCNC: 17 MG/DL — SIGNIFICANT CHANGE UP (ref 7–23)
BUN SERPL-MCNC: 20 MG/DL — SIGNIFICANT CHANGE UP (ref 7–23)
CALCIUM SERPL-MCNC: 8.9 MG/DL — SIGNIFICANT CHANGE UP (ref 8.4–10.5)
CALCIUM SERPL-MCNC: 9.2 MG/DL — SIGNIFICANT CHANGE UP (ref 8.4–10.5)
CHLORIDE SERPL-SCNC: 102 MMOL/L — SIGNIFICANT CHANGE UP (ref 96–108)
CHLORIDE SERPL-SCNC: 103 MMOL/L — SIGNIFICANT CHANGE UP (ref 96–108)
CO2 SERPL-SCNC: 20 MMOL/L — LOW (ref 22–31)
CO2 SERPL-SCNC: 25 MMOL/L — SIGNIFICANT CHANGE UP (ref 22–31)
CREAT SERPL-MCNC: 0.73 MG/DL — SIGNIFICANT CHANGE UP (ref 0.5–1.3)
CREAT SERPL-MCNC: 0.78 MG/DL — SIGNIFICANT CHANGE UP (ref 0.5–1.3)
GAS PNL BLDA: SIGNIFICANT CHANGE UP
GLUCOSE SERPL-MCNC: 153 MG/DL — HIGH (ref 70–99)
GLUCOSE SERPL-MCNC: 162 MG/DL — HIGH (ref 70–99)
HCT VFR BLD CALC: 38.6 % — SIGNIFICANT CHANGE UP (ref 34.5–45)
HCT VFR BLD CALC: 39.3 % — SIGNIFICANT CHANGE UP (ref 34.5–45)
HGB BLD-MCNC: 12.7 G/DL — SIGNIFICANT CHANGE UP (ref 11.5–15.5)
HGB BLD-MCNC: 13.2 G/DL — SIGNIFICANT CHANGE UP (ref 11.5–15.5)
LACTATE SERPL-SCNC: 2.9 MMOL/L — HIGH (ref 0.7–2)
LACTATE SERPL-SCNC: 3.2 MMOL/L — HIGH (ref 0.7–2)
LACTATE SERPL-SCNC: 4.9 MMOL/L — CRITICAL HIGH (ref 0.7–2)
MCHC RBC-ENTMCNC: 30 PG — SIGNIFICANT CHANGE UP (ref 27–34)
MCHC RBC-ENTMCNC: 31.3 PG — SIGNIFICANT CHANGE UP (ref 27–34)
MCHC RBC-ENTMCNC: 32.9 GM/DL — SIGNIFICANT CHANGE UP (ref 32–36)
MCHC RBC-ENTMCNC: 33.5 GM/DL — SIGNIFICANT CHANGE UP (ref 32–36)
MCV RBC AUTO: 91 FL — SIGNIFICANT CHANGE UP (ref 80–100)
MCV RBC AUTO: 93.4 FL — SIGNIFICANT CHANGE UP (ref 80–100)
PLATELET # BLD AUTO: 242 K/UL — SIGNIFICANT CHANGE UP (ref 150–400)
PLATELET # BLD AUTO: 247 K/UL — SIGNIFICANT CHANGE UP (ref 150–400)
POTASSIUM SERPL-MCNC: 3.9 MMOL/L — SIGNIFICANT CHANGE UP (ref 3.5–5.3)
POTASSIUM SERPL-MCNC: 4.3 MMOL/L — SIGNIFICANT CHANGE UP (ref 3.5–5.3)
POTASSIUM SERPL-SCNC: 3.9 MMOL/L — SIGNIFICANT CHANGE UP (ref 3.5–5.3)
POTASSIUM SERPL-SCNC: 4.3 MMOL/L — SIGNIFICANT CHANGE UP (ref 3.5–5.3)
RBC # BLD: 4.21 M/UL — SIGNIFICANT CHANGE UP (ref 3.8–5.2)
RBC # BLD: 4.24 M/UL — SIGNIFICANT CHANGE UP (ref 3.8–5.2)
RBC # FLD: 13.2 % — SIGNIFICANT CHANGE UP (ref 10.3–14.5)
RBC # FLD: 15.8 % — HIGH (ref 10.3–14.5)
SODIUM SERPL-SCNC: 139 MMOL/L — SIGNIFICANT CHANGE UP (ref 135–145)
SODIUM SERPL-SCNC: 140 MMOL/L — SIGNIFICANT CHANGE UP (ref 135–145)
WBC # BLD: 11.81 K/UL — HIGH (ref 3.8–10.5)
WBC # BLD: 12.6 K/UL — HIGH (ref 3.8–10.5)
WBC # FLD AUTO: 11.81 K/UL — HIGH (ref 3.8–10.5)
WBC # FLD AUTO: 12.6 K/UL — HIGH (ref 3.8–10.5)

## 2018-02-19 PROCEDURE — 71045 X-RAY EXAM CHEST 1 VIEW: CPT | Mod: 26

## 2018-02-19 PROCEDURE — 99223 1ST HOSP IP/OBS HIGH 75: CPT | Mod: GC

## 2018-02-19 RX ORDER — HALOPERIDOL DECANOATE 100 MG/ML
2.5 INJECTION INTRAMUSCULAR ONCE
Qty: 0 | Refills: 0 | Status: COMPLETED | OUTPATIENT
Start: 2018-02-19 | End: 2018-02-19

## 2018-02-19 RX ORDER — SODIUM CHLORIDE 9 MG/ML
1000 INJECTION INTRAMUSCULAR; INTRAVENOUS; SUBCUTANEOUS
Qty: 0 | Refills: 0 | Status: DISCONTINUED | OUTPATIENT
Start: 2018-02-19 | End: 2018-02-20

## 2018-02-19 RX ORDER — IPRATROPIUM/ALBUTEROL SULFATE 18-103MCG
3 AEROSOL WITH ADAPTER (GRAM) INHALATION ONCE
Qty: 0 | Refills: 0 | Status: COMPLETED | OUTPATIENT
Start: 2018-02-19 | End: 2018-02-19

## 2018-02-19 RX ADMIN — Medication 20 MILLIGRAM(S): at 05:26

## 2018-02-19 RX ADMIN — Medication 1 MILLIGRAM(S): at 22:38

## 2018-02-19 RX ADMIN — Medication 0.5 MILLIGRAM(S): at 05:26

## 2018-02-19 RX ADMIN — SODIUM CHLORIDE 75 MILLILITER(S): 9 INJECTION INTRAMUSCULAR; INTRAVENOUS; SUBCUTANEOUS at 22:32

## 2018-02-19 RX ADMIN — Medication 20 MILLIGRAM(S): at 13:00

## 2018-02-19 RX ADMIN — HALOPERIDOL DECANOATE 2.5 MILLIGRAM(S): 100 INJECTION INTRAMUSCULAR at 00:42

## 2018-02-19 RX ADMIN — HEPARIN SODIUM 5000 UNIT(S): 5000 INJECTION INTRAVENOUS; SUBCUTANEOUS at 13:00

## 2018-02-19 RX ADMIN — HEPARIN SODIUM 5000 UNIT(S): 5000 INJECTION INTRAVENOUS; SUBCUTANEOUS at 22:30

## 2018-02-19 RX ADMIN — Medication 3 MILLILITER(S): at 17:48

## 2018-02-19 RX ADMIN — Medication 3 MILLILITER(S): at 12:58

## 2018-02-19 RX ADMIN — Medication 3 MILLILITER(S): at 05:26

## 2018-02-19 RX ADMIN — SODIUM CHLORIDE 75 MILLILITER(S): 9 INJECTION INTRAMUSCULAR; INTRAVENOUS; SUBCUTANEOUS at 17:48

## 2018-02-19 RX ADMIN — Medication 3 MILLILITER(S): at 22:38

## 2018-02-19 RX ADMIN — Medication 60 MILLIGRAM(S): at 00:49

## 2018-02-19 RX ADMIN — HEPARIN SODIUM 5000 UNIT(S): 5000 INJECTION INTRAVENOUS; SUBCUTANEOUS at 05:26

## 2018-02-19 RX ADMIN — RISPERIDONE 1 MILLIGRAM(S): 4 TABLET ORAL at 22:30

## 2018-02-19 RX ADMIN — FAMOTIDINE 20 MILLIGRAM(S): 10 INJECTION INTRAVENOUS at 12:59

## 2018-02-19 RX ADMIN — RISPERIDONE 1 MILLIGRAM(S): 4 TABLET ORAL at 07:50

## 2018-02-19 RX ADMIN — Medication 50 MICROGRAM(S): at 05:27

## 2018-02-19 RX ADMIN — Medication 20 MILLIGRAM(S): at 22:31

## 2018-02-19 RX ADMIN — Medication 0.5 MILLIGRAM(S): at 17:48

## 2018-02-19 RX ADMIN — Medication 3 MILLILITER(S): at 00:43

## 2018-02-19 NOTE — CONSULT NOTE ADULT - ASSESSMENT
68 F (poor historian) with h/o Alzheimer's dementia, paranoid schizophrenia, hypothyroidism, GI bleed, PVD, ASHD sent from [facility]isted [facility] because she has been having nonproductive cough with SOB for the last few days. No c/o fever, chest pain.   In ER she remained afebrile, BP has bene stable, , her O2 sat was 89%, VBG 7.4/38/23/89%. she was Put on Bipap. CXR showed no consolidation or Pneumonia/CHF.     WBC 4.9
The patient is a 68 F (poor historian) with h/o Alzheimer's dementia, paranoid schizophrenia, hypothyroidism, GI bleed, PVD, ASHD sent from [facility]) [facility]isted Living because of shortness of breath and cough, found to have RSV.

## 2018-02-19 NOTE — CONSULT NOTE ADULT - SUBJECTIVE AND OBJECTIVE BOX
CHIEF COMPLAINT: tachypnea     HPI:  The patient is a 68 F (poor historian) with h/o Alzheimer's dementia, paranoid schizophrenia, hypothyroidism, GI bleed, PVD, ASHD sent from [facility] bbecause of shortness of breath and cough, found to have RSV.  Is currently being treated with supportive care without antibiotics, CXR w/o infiltrate to suggest bacterial PNA.  MICU consult called because patient has been more tachypnic today to 50s while on bipap.  She was switched to AVAPs with improvement in tachypnea.  She denies shortness of breath, cough, fevers, chills, abdominal pain.  She is currently on risperidone and ativan for schizophrenia.        PAST MEDICAL & SURGICAL HISTORY:  Hypothyroid  Upper GI bleed  Paranoid schizophrenia  UTI (urinary tract infection)  Alzheimer's dementia  No significant past surgical history      FAMILY HISTORY:  No pertinent family history in first degree relatives      SOCIAL HISTORY:  Smoking: [ ] Never Smoked [ ] Former Smoker (__ packs x ___ years) [ ] Current Smoker  (__ packs x ___ years)  Substance Use: [ ] Never Used [ ] Used ____  EtOH Use:  Marital Status: [ ] Single [ ]  [ ]  [ ]   Sexual History:   Occupation:  Recent Travel:  Country of Birth:  Advance Directives:    Allergies    No Known Allergies    Intolerances        HOME MEDICATIONS:    REVIEW OF SYSTEMS:  Constitutional: [x] negative [ ] fevers [ ] chills [ ] weight loss [ ] weight gain  HEENT: [X] negative [ ] dry eyes [ ] eye irritation [ ] postnasal drip [ ] nasal congestion  CV: [X] negative  [ ] chest pain [ ] orthopnea [ ] palpitations [ ] murmur  Resp: [ ] negative [ ] cough [ ] shortness of breath [ ] dyspnea [ ] wheezing [ ] sputum [ ] hemoptysis [X] tahcypnea   GI: [X] negative [ ] nausea [ ] vomiting [ ] diarrhea [ ] constipation [ ] abd pain [ ] dysphagia   : [X] negative [ ] dysuria [ ] nocturia [ ] hematuria [ ] increased urinary frequency  Musculoskeletal: [X] negative [ ] back pain [ ] myalgias [ ] arthralgias [ ] fracture  Skin: [X] negative [ ] rash [ ] itch  Neurological: [X] negative [ ] headache [ ] dizziness [ ] syncope [ ] weakness [ ] numbness  Psychiatric: [X] negative [ ] anxiety [ ] depression  Endocrine: [X] negative [ ] diabetes [ ] thyroid problem  Hematologic/Lymphatic: [X] negative [ ] anemia [ ] bleeding problem  Allergic/Immunologic: [X] negative [ ] itchy eyes [ ] nasal discharge [ ] hives [ ] angioedema  [ ] All other systems negative  [ ] Unable to assess ROS because ________    OBJECTIVE:  ICU Vital Signs Last 24 Hrs  T(C): 36.8 (18 Feb 2018 21:44), Max: 36.9 (18 Feb 2018 08:14)  T(F): 98.3 (18 Feb 2018 21:44), Max: 98.4 (18 Feb 2018 08:14)  HR: 86 (18 Feb 2018 23:35) (80 - 104)  BP: 162/79 (18 Feb 2018 21:44) (106/66 - 162/79)  BP(mean): --  ABP: --  ABP(mean): --  RR: 44 (18 Feb 2018 23:35) (18 - 44)  SpO2: 94% (18 Feb 2018 23:35) (94% - 96%)        02-17 @ 07:01 - 02-18 @ 07:00  --------------------------------------------------------  IN: 640 mL / OUT: 200 mL / NET: 440 mL    02-18 @ 07:01  - 02-19 @ 02:10  --------------------------------------------------------  IN: 1020 mL / OUT: 200 mL / NET: 820 mL      CAPILLARY BLOOD GLUCOSE          PHYSICAL EXAM:  General:   HEENT:   Lymph Nodes:  Neck:   Respiratory:   Cardiovascular:   Abdomen:   Extremities:   Skin:   Neurological:  Psychiatry:    LINES:     HOSPITAL MEDICATIONS:  heparin  Injectable 5000 Unit(s) SubCutaneous every 8 hours        levothyroxine 50 MICROGram(s) Oral daily  methylPREDNISolone sodium succinate Injectable 20 milliGRAM(s) IV Push three times a day    ALBUTerol/ipratropium for Nebulization 3 milliLiter(s) Nebulizer every 6 hours  buDESOnide   0.5 milliGRAM(s) Respule 0.5 milliGRAM(s) Inhalation two times a day    acetaminophen   Tablet 650 milliGRAM(s) Oral every 6 hours PRN  LORazepam     Tablet 1 milliGRAM(s) Oral at bedtime  risperiDONE   Solution 1 milliGRAM(s) Oral two times a day    famotidine    Tablet 20 milliGRAM(s) Oral daily                  LABS:                        13.2   12.6  )-----------( 242      ( 19 Feb 2018 00:38 )             39.3     Hgb Trend: 13.2<--, 12.5<--, 13.7<--  02-19    140  |  102  |  17  ----------------------------<  153<H>  3.9   |  25  |  0.73    Ca    8.9      19 Feb 2018 00:38      Creatinine Trend: 0.73<--, 0.80<--, 0.82<--, 0.80<--      Arterial Blood Gas:  02-19 @ 00:23  7.41/38/186/24/99/-.1  ABG lactate: --  Arterial Blood Gas:  02-18 @ 16:52  7.42/35/114/22/98/-1.6  ABG lactate: --  Arterial Blood Gas:  02-17 @ 11:45  7.42/35/86/22/97/-1.6  ABG lactate: --        MICROBIOLOGY:     RADIOLOGY:  [ ] Reviewed and interpreted by me    EKG: CHIEF COMPLAINT: tachypnea     HPI:  The patient is a 68 F (poor historian) with h/o Alzheimer's dementia, paranoid schizophrenia, hypothyroidism, GI bleed, PVD, ASHD sent from [facility] because of shortness of breath and cough, found to have RSV.  Is currently being treated with supportive care without antibiotics, CXR w/o infiltrate to suggest bacterial PNA.  MICU consult called because patient has been more tachypnic today to 50s while on bipap.  She was switched to AVAPs with improvement in tachypnea.  She denies shortness of breath, cough, fevers, chills, abdominal pain.  She is currently on risperidone and ativan for schizophrenia.        PAST MEDICAL & SURGICAL HISTORY:  Hypothyroid  Upper GI bleed  Paranoid schizophrenia  UTI (urinary tract infection)  Alzheimer's dementia  No significant past surgical history      FAMILY HISTORY:  No pertinent family history in first degree relatives      SOCIAL HISTORY:  Smoking: [ ] Never Smoked [ ] Former Smoker (__ packs x ___ years) [ ] Current Smoker  (__ packs x ___ years)  Substance Use: [ ] Never Used [ ] Used ____  EtOH Use:  Marital Status: [ ] Single [ ]  [ ]  [ ]   Sexual History:   Occupation:  Recent Travel:  Country of Birth:  Advance Directives:    Allergies    No Known Allergies    Intolerances        HOME MEDICATIONS:    REVIEW OF SYSTEMS:  Constitutional: [x] negative [ ] fevers [ ] chills [ ] weight loss [ ] weight gain  HEENT: [X] negative [ ] dry eyes [ ] eye irritation [ ] postnasal drip [ ] nasal congestion  CV: [X] negative  [ ] chest pain [ ] orthopnea [ ] palpitations [ ] murmur  Resp: [ ] negative [ ] cough [ ] shortness of breath [ ] dyspnea [ ] wheezing [ ] sputum [ ] hemoptysis [X] tahcypnea   GI: [X] negative [ ] nausea [ ] vomiting [ ] diarrhea [ ] constipation [ ] abd pain [ ] dysphagia   : [X] negative [ ] dysuria [ ] nocturia [ ] hematuria [ ] increased urinary frequency  Musculoskeletal: [X] negative [ ] back pain [ ] myalgias [ ] arthralgias [ ] fracture  Skin: [X] negative [ ] rash [ ] itch  Neurological: [X] negative [ ] headache [ ] dizziness [ ] syncope [ ] weakness [ ] numbness  Psychiatric: [X] negative [ ] anxiety [ ] depression  Endocrine: [X] negative [ ] diabetes [ ] thyroid problem  Hematologic/Lymphatic: [X] negative [ ] anemia [ ] bleeding problem  Allergic/Immunologic: [X] negative [ ] itchy eyes [ ] nasal discharge [ ] hives [ ] angioedema  [ ] All other systems negative  [ ] Unable to assess ROS because ________    OBJECTIVE:  ICU Vital Signs Last 24 Hrs  T(C): 36.8 (18 Feb 2018 21:44), Max: 36.9 (18 Feb 2018 08:14)  T(F): 98.3 (18 Feb 2018 21:44), Max: 98.4 (18 Feb 2018 08:14)  HR: 86 (18 Feb 2018 23:35) (80 - 104)  BP: 162/79 (18 Feb 2018 21:44) (106/66 - 162/79)  BP(mean): --  ABP: --  ABP(mean): --  RR: 44 (18 Feb 2018 23:35) (18 - 44)  SpO2: 94% (18 Feb 2018 23:35) (94% - 96%)        02-17 @ 07:01  -  02-18 @ 07:00  --------------------------------------------------------  IN: 640 mL / OUT: 200 mL / NET: 440 mL    02-18 @ 07:01  - 02-19 @ 02:10  --------------------------------------------------------  IN: 1020 mL / OUT: 200 mL / NET: 820 mL      CAPILLARY BLOOD GLUCOSE          PHYSICAL EXAM:  General: NAD, on bipap  HEENT: on bipap  Neck: no JVD   Respiratory: +scattered wheezing, no crackles   Cardiovascular: RRR, no m/r/g  Abdomen: s/nt/nd, +BS   Extremities: no LE edema  Skin: no rash   Neurological: AAOx3      LINES: PIVs    HOSPITAL MEDICATIONS:  heparin  Injectable 5000 Unit(s) SubCutaneous every 8 hours        levothyroxine 50 MICROGram(s) Oral daily  methylPREDNISolone sodium succinate Injectable 20 milliGRAM(s) IV Push three times a day    ALBUTerol/ipratropium for Nebulization 3 milliLiter(s) Nebulizer every 6 hours  buDESOnide   0.5 milliGRAM(s) Respule 0.5 milliGRAM(s) Inhalation two times a day    acetaminophen   Tablet 650 milliGRAM(s) Oral every 6 hours PRN  LORazepam     Tablet 1 milliGRAM(s) Oral at bedtime  risperiDONE   Solution 1 milliGRAM(s) Oral two times a day    famotidine    Tablet 20 milliGRAM(s) Oral daily                  LABS:                        13.2   12.6  )-----------( 242      ( 19 Feb 2018 00:38 )             39.3     Hgb Trend: 13.2<--, 12.5<--, 13.7<--  02-19    140  |  102  |  17  ----------------------------<  153<H>  3.9   |  25  |  0.73    Ca    8.9      19 Feb 2018 00:38      Creatinine Trend: 0.73<--, 0.80<--, 0.82<--, 0.80<--      Arterial Blood Gas:  02-19 @ 00:23  7.41/38/186/24/99/-.1  ABG lactate: --  Arterial Blood Gas:  02-18 @ 16:52  7.42/35/114/22/98/-1.6  ABG lactate: --  Arterial Blood Gas:  02-17 @ 11:45  7.42/35/86/22/97/-1.6  ABG lactate: --        MICROBIOLOGY:     RADIOLOGY:  [ ] Reviewed and interpreted by me    EKG:

## 2018-02-19 NOTE — CONSULT NOTE ADULT - PROBLEM SELECTOR RECOMMENDATION 9
todays ABG noted: pretty good: Pt was on AVAPS before , but currently off , and she has been responding to simple questions pretty well  Decrease steroids from tomorrow: Cont current BD as well as AVAPS as needed. She was seen by MICU team also today: will follow again in day time if her respiratory status deteriorates again:  Not retaining CO2::: : :
- repeat ABG 7.41/38/186/24  - CXR w/ small R pleural effusion, no consolidation  - c/w supportive care - duonebs, given solumedrol 60 mg x 1 for wheezing/bronchospasm  - likely psych component as well as intermittently tachypnic, no cough, ABG/CXR wnl, given haldol x 1

## 2018-02-19 NOTE — CONSULT NOTE ADULT - ATTENDING COMMENTS
The is a 68 F patient  (poor historian) with h/o Alzheimer's dementia, paranoid schizophrenia, hypothyroidism, GI bleed, PVD, ASHD who presented from  [facility] because of shortness of breath and cough, found to have RSV.  Is currently being treated with supportive care without antibiotics, CXR w/o infiltrate to suggest bacterial PNA.  MICU consult called because patient has been more tachypneic today to 50s while on bipap.  She was switched to AVAPs with improvement in tachypnea. Patient with -  1. paranoid schizophrenia- unclear baseline and manifestation, EKG with haloperidol and low dose lorazepam ( 0.5mg ) prn anxiety attacks,  patient A&O X 3 but has limited insight into her clinical status, neuro check Q 3 hours, check serum and urine toxicology , continue patient's home neurotropic regimen, may need further optimization by psyche team   2. Acute hypoxemic respiratory failure with erratic respiratory pattern in the setting of RSV infection/ bronchitis - NIV, IV steroids, pulmonary toilet, Duoneb, no evidence of infiltrate but low threshold for post viral abx treatment    Care and treatment as detailed above. No need for MICU at this juncture but will continue to follow.   Case discussed extensively with patient, staff and team. Have been unable to get in touch with family to discussed patient's GOC and quality of life. Primary team needs to update patient's profile with contact information ().
LETI DIANE: Her Legal Guardian:

## 2018-02-19 NOTE — CONSULT NOTE ADULT - PROBLEM SELECTOR RECOMMENDATION 2
supportive care: pt is on steroids; would start tapering down from tomorrow
- c/w supportive care, would not start antibacterial coverage as no consolidation on CXR

## 2018-02-19 NOTE — CONSULT NOTE ADULT - SUBJECTIVE AND OBJECTIVE BOX
I have seen and examined the patient and reviewed the history: History is limited but pt is able to talk to me for some time with appropriate questions: Chart reviewed: MICU consulted to day because of tachypnea: However at this time pt is off AVAPS and have been doing OK without much of tachypnea:     Also called ENG-Tomas: on his cell phone: She has no lung disease : she isnot smoker or any other pulmonary disease: She was sent for breathing little funny"  She is in nursing home for 9 years:  He is her legal guardian  Patient is a 68y old  Female who presents with a chief complaint of She has been having some cough and SOB per HCP (16 Feb 2018 18:09)      HPI:  (history obtained from transfer note, patient and HCP-Tomas)-The patient is a 68 F (poor historian) with h/o Alzheimer's dementia, paranoid schizophrenia, hypothyroidism, GI bleed, PVD, ASHD [place] because she has been having nonproductive cough with SOB for the last few days. No c/o fever, chest pain.   As per HCP-Tomas she visited her on 2/8 at Assisted living and she was in her regular state of health. On 2/13/18 she was started on Tamiflu, likely for her symptoms but no documentation of Influenza., (16 Feb 2018 18:09)      ?FOLLOWING PRESENT  [x ] Hx of PE/DVT, [x ] Hx COPD, [x ] Hx of Asthma, [x ] Hx of Hospitalization, [ x]  Hx of BiPAP/CPAP use, [ x] Hx of SANAZ    Allergies    No Known Allergies    Intolerances        PAST MEDICAL & SURGICAL HISTORY:  Hypothyroid  Upper GI bleed  Paranoid schizophrenia  UTI (urinary tract infection)  Alzheimer's dementia  No significant past surgical history      FAMILY HISTORY:  No pertinent family history in first degree relatives      Social History: [ x ] TOBACCO                  [  x] ETOH                                 [  ] IVDA/DRUGS    REVIEW OF SYSTEMS    pt could not obtain :    General:	    Skin/Breast:  	  Ophthalmologic:  	  ENMT:	    Respiratory and Thorax:  	  Cardiovascular:	    Gastrointestinal:	    Genitourinary:	    Musculoskeletal:	    Neurological:	    Psychiatric:	    Hematology/Lymphatics:	    Endocrine:	    Allergic/Immunologic:	    MEDICATIONS  (STANDING):  ALBUTerol/ipratropium for Nebulization 3 milliLiter(s) Nebulizer every 6 hours  buDESOnide   0.5 milliGRAM(s) Respule 0.5 milliGRAM(s) Inhalation two times a day  famotidine    Tablet 20 milliGRAM(s) Oral daily  heparin  Injectable 5000 Unit(s) SubCutaneous every 8 hours  levothyroxine 50 MICROGram(s) Oral daily  LORazepam     Tablet 1 milliGRAM(s) Oral at bedtime  methylPREDNISolone sodium succinate Injectable 20 milliGRAM(s) IV Push three times a day  risperiDONE   Solution 1 milliGRAM(s) Oral two times a day  sodium chloride 0.9%. 1000 milliLiter(s) (75 mL/Hr) IV Continuous <Continuous>    MEDICATIONS  (PRN):  acetaminophen   Tablet 650 milliGRAM(s) Oral every 6 hours PRN For Temp greater than 38.5 C (101.3 F)       Vital Signs Last 24 Hrs  T(C): 36.6 (19 Feb 2018 07:53), Max: 36.8 (18 Feb 2018 21:44)  T(F): 97.9 (19 Feb 2018 07:53), Max: 98.3 (18 Feb 2018 21:44)  HR: 91 (19 Feb 2018 07:53) (86 - 104)  BP: 141/82 (19 Feb 2018 07:53) (141/82 - 162/79)  BP(mean): --  RR: 20 (19 Feb 2018 07:53) (20 - 44)  SpO2: 92% (19 Feb 2018 07:53) (92% - 98%)        I&O's Summary    18 Feb 2018 07:01  -  19 Feb 2018 07:00  --------------------------------------------------------  IN: 1020 mL / OUT: 200 mL / NET: 820 mL    19 Feb 2018 07:01  -  19 Feb 2018 13:28  --------------------------------------------------------  IN: 350 mL / OUT: 0 mL / NET: 350 mL        Physical Exam:   GENERAL: NAD, well-groomed, well-developed  HEENT: LILIANA/   Atraumatic, Normocephalic  ENMT: No tonsillar erythema, exudates, or enlargement; Moist mucous membranes, Good dentition, No lesions  NECK: Supple, No JVD, Normal thyroid  CHEST/LUNG: Clear to auscultation bilaterally  CVS: Regular rate and rhythm; No murmurs, rubs, or gallops  GI: : Soft, Nontender, Nondistended; Bowel sounds present  NERVOUS SYSTEM:  Alert & Oriented x 2  EXTREMITIES:  2+ Peripheral Pulses, No clubbing, cyanosis, or edema  LYMPH: No lymphadenopathy noted  SKIN: No rashes or lesions  ENDOCRINOLOGY: No Thyromegaly  PSYCH: calm    Labs:  ABG - ( 19 Feb 2018 00:23 )  pH: 7.41  /  pCO2: 38    /  pO2: 186   / HCO3: 24    / Base Excess: -.1   /  SaO2: 99              Venous<38<4>>see note<<7.415>>Venous<<3><<4><<5<<see note9>>                            12.7   11.81 )-----------( 247      ( 19 Feb 2018 09:05 )             38.6                         13.2   12.6  )-----------( 242      ( 19 Feb 2018 00:38 )             39.3                         12.5   5.91  )-----------( 238      ( 17 Feb 2018 08:56 )             37.3                         13.7   6.3   )-----------( 253      ( 16 Feb 2018 13:58 )             41.5     02-19    139  |  103  |  20  ----------------------------<  162<H>  4.3   |  20<L>  |  0.78  02-19    140  |  102  |  17  ----------------------------<  153<H>  3.9   |  25  |  0.73  02-18    144  |  109<H>  |  21  ----------------------------<  131<H>  4.3   |  21<L>  |  0.80  02-17    142  |  109<H>  |  18  ----------------------------<  134<H>  4.3   |  20<L>  |  0.82  02-16    141  |  104  |  15  ----------------------------<  113<H>  3.8   |  21<L>  |  0.80    Ca    9.2      19 Feb 2018 09:16  Ca    8.9      19 Feb 2018 00:38  Ca    9.2      18 Feb 2018 08:29    TPro  7.4  /  Alb  4.0  /  TBili  0.2  /  DBili  x   /  AST  21  /  ALT  19  /  AlkPhos  88  02-16    CAPILLARY BLOOD GLUCOSE              D DImerLactate, Blood: 4.9 mmol/L (02-19 @ 07:13)  Lactate, Blood: 2.9 mmol/L (02-19 @ 00:38)    D-Dimer Assay, Quantitative: 155 ng/mL DDU (02-16 @ 13:58)    Cultures:           Wound culture:                02-16 @ 17:15  Organism --  Culture w/ gram stain --  Specimen Source .Blood Blood-Peripheral    Wound culture:                02-16 @ 17:04  Organism --  Culture w/ gram stain --  Specimen Source .Urine Catheterized      Abscess culture:             02-16 @ 17:15  Organism --  Gram Stain --  Specimen Source .Blood Blood-Peripheral    Abscess culture:             02-16 @ 17:04  Organism --  Gram Stain --  Specimen Source .Urine Catheterized        Tissue culture:           02-16 @ 17:15  Organism --  Gram Stain --  Specimen Source .Blood Blood-Peripheral    Tissue culture:           02-16 @ 17:04  Organism --  Gram Stain --  Specimen Source .Urine Catheterized      Body Fluid Smear & Culture:                        02-16 @ 17:15  AFB Smear  --  Culture Acid Fast Body Fluid w/ Smear  --  Culture Acid Fast Smear Concentrated   --    Culture Results:       No growth to date.  Specimen Source .Blood Blood-Peripheral    Body Fluid Smear & Culture:                        02-16 @ 17:04  AFB Smear  --  Culture Acid Fast Body Fluid w/ Smear  --  Culture Acid Fast Smear Concentrated   --    Culture Results:       No growth  Specimen Source .Urine Catheterized        Rapid Respiratory Viral Panel Result        02-16 @ 14:09  Rapid RVP Detected  Coronovirus --  Adenovirus --  Bordetella Pertussis --  Chlamydia Pneumonia --  Entero/Rhinovirus--  HKU1 Coronovirus --  HMPV Coronovirus --  Influenza A --  Influenza AH1 --  Influenza AH1 2009 --  Influenza AH3 --  Influenza B --  Mycoplasma Pneumoniae --  NL63 Coronovirus --  OC43 Coronovirus --  Parainfluenza 1 --  Parainfluenza 2 --  Parainfluenza 3 --  Parainfluenza 4 --  Resp Syncytial Virus Detected        Studies  Chest X-RAY  CT SCAN Chest   CT Abdomen  Venous Dopplers: LE:   Others  < from: Xray Chest 1 View- PORTABLE-Urgent (02.19.18 @ 00:11) >    ******PRELIMINARY REPORT******    ******PRELIMINARY REPORT******          EXAM:  XR CHEST PORTABLE URGENT 1V                            PROCEDURE DATE:  02/19/2018      ******PRELIMINARY REPORT******    ******PRELIMINARY REPORT******              INTERPRETATION:  DJO              ******PRELIMINARY REPORT******    ******PRELIMINARY REPORT******          SAHARA DENSON M.D., RADIOLOGY RESIDENT    < end of copied text >

## 2018-02-20 LAB
ANION GAP SERPL CALC-SCNC: 12 MMOL/L — SIGNIFICANT CHANGE UP (ref 5–17)
BUN SERPL-MCNC: 23 MG/DL — SIGNIFICANT CHANGE UP (ref 7–23)
CALCIUM SERPL-MCNC: 8.9 MG/DL — SIGNIFICANT CHANGE UP (ref 8.4–10.5)
CHLORIDE SERPL-SCNC: 106 MMOL/L — SIGNIFICANT CHANGE UP (ref 96–108)
CO2 SERPL-SCNC: 22 MMOL/L — SIGNIFICANT CHANGE UP (ref 22–31)
CREAT SERPL-MCNC: 0.73 MG/DL — SIGNIFICANT CHANGE UP (ref 0.5–1.3)
GLUCOSE BLDC GLUCOMTR-MCNC: 128 MG/DL — HIGH (ref 70–99)
GLUCOSE SERPL-MCNC: 137 MG/DL — HIGH (ref 70–99)
HCT VFR BLD CALC: 36.3 % — SIGNIFICANT CHANGE UP (ref 34.5–45)
HGB BLD-MCNC: 11.9 G/DL — SIGNIFICANT CHANGE UP (ref 11.5–15.5)
LACTATE SERPL-SCNC: 2.3 MMOL/L — HIGH (ref 0.7–2)
MCHC RBC-ENTMCNC: 30.2 PG — SIGNIFICANT CHANGE UP (ref 27–34)
MCHC RBC-ENTMCNC: 32.8 GM/DL — SIGNIFICANT CHANGE UP (ref 32–36)
MCV RBC AUTO: 92.1 FL — SIGNIFICANT CHANGE UP (ref 80–100)
NT-PROBNP SERPL-SCNC: 115 PG/ML — SIGNIFICANT CHANGE UP (ref 0–300)
PCP SPEC-MCNC: SIGNIFICANT CHANGE UP
PLATELET # BLD AUTO: 253 K/UL — SIGNIFICANT CHANGE UP (ref 150–400)
POTASSIUM SERPL-MCNC: 4.6 MMOL/L — SIGNIFICANT CHANGE UP (ref 3.5–5.3)
POTASSIUM SERPL-SCNC: 4.6 MMOL/L — SIGNIFICANT CHANGE UP (ref 3.5–5.3)
PROCALCITONIN SERPL-MCNC: <0.05 NG/ML — SIGNIFICANT CHANGE UP (ref 0–0.04)
RBC # BLD: 3.94 M/UL — SIGNIFICANT CHANGE UP (ref 3.8–5.2)
RBC # FLD: 15.6 % — HIGH (ref 10.3–14.5)
SODIUM SERPL-SCNC: 140 MMOL/L — SIGNIFICANT CHANGE UP (ref 135–145)
WBC # BLD: 13.9 K/UL — HIGH (ref 3.8–10.5)
WBC # FLD AUTO: 13.9 K/UL — HIGH (ref 3.8–10.5)

## 2018-02-20 RX ADMIN — HEPARIN SODIUM 5000 UNIT(S): 5000 INJECTION INTRAVENOUS; SUBCUTANEOUS at 05:02

## 2018-02-20 RX ADMIN — RISPERIDONE 1 MILLIGRAM(S): 4 TABLET ORAL at 19:28

## 2018-02-20 RX ADMIN — Medication 50 MICROGRAM(S): at 05:02

## 2018-02-20 RX ADMIN — Medication 20 MILLIGRAM(S): at 05:06

## 2018-02-20 RX ADMIN — Medication 1 MILLIGRAM(S): at 21:05

## 2018-02-20 RX ADMIN — HEPARIN SODIUM 5000 UNIT(S): 5000 INJECTION INTRAVENOUS; SUBCUTANEOUS at 21:05

## 2018-02-20 RX ADMIN — Medication 3 MILLILITER(S): at 05:06

## 2018-02-20 RX ADMIN — HEPARIN SODIUM 5000 UNIT(S): 5000 INJECTION INTRAVENOUS; SUBCUTANEOUS at 13:07

## 2018-02-20 RX ADMIN — Medication 0.5 MILLIGRAM(S): at 18:07

## 2018-02-20 RX ADMIN — Medication 30 MILLIGRAM(S): at 18:07

## 2018-02-20 RX ADMIN — Medication 0.25 MILLIGRAM(S): at 18:58

## 2018-02-20 RX ADMIN — Medication 3 MILLILITER(S): at 13:05

## 2018-02-20 RX ADMIN — FAMOTIDINE 20 MILLIGRAM(S): 10 INJECTION INTRAVENOUS at 13:07

## 2018-02-20 RX ADMIN — Medication 0.5 MILLIGRAM(S): at 05:06

## 2018-02-20 RX ADMIN — Medication 3 MILLILITER(S): at 18:07

## 2018-02-20 RX ADMIN — RISPERIDONE 1 MILLIGRAM(S): 4 TABLET ORAL at 09:31

## 2018-02-21 LAB
ANION GAP SERPL CALC-SCNC: 15 MMOL/L — SIGNIFICANT CHANGE UP (ref 5–17)
APPEARANCE UR: CLEAR — SIGNIFICANT CHANGE UP
BILIRUB UR-MCNC: NEGATIVE — SIGNIFICANT CHANGE UP
BUN SERPL-MCNC: 22 MG/DL — SIGNIFICANT CHANGE UP (ref 7–23)
CALCIUM SERPL-MCNC: 9.3 MG/DL — SIGNIFICANT CHANGE UP (ref 8.4–10.5)
CHLORIDE SERPL-SCNC: 102 MMOL/L — SIGNIFICANT CHANGE UP (ref 96–108)
CO2 SERPL-SCNC: 23 MMOL/L — SIGNIFICANT CHANGE UP (ref 22–31)
COLOR SPEC: YELLOW — SIGNIFICANT CHANGE UP
CREAT SERPL-MCNC: 1.03 MG/DL — SIGNIFICANT CHANGE UP (ref 0.5–1.3)
CULTURE RESULTS: SIGNIFICANT CHANGE UP
CULTURE RESULTS: SIGNIFICANT CHANGE UP
DIFF PNL FLD: NEGATIVE — SIGNIFICANT CHANGE UP
GLUCOSE SERPL-MCNC: 99 MG/DL — SIGNIFICANT CHANGE UP (ref 70–99)
GLUCOSE UR QL: NEGATIVE MG/DL — SIGNIFICANT CHANGE UP
HCT VFR BLD CALC: 40.2 % — SIGNIFICANT CHANGE UP (ref 34.5–45)
HGB BLD-MCNC: 13.2 G/DL — SIGNIFICANT CHANGE UP (ref 11.5–15.5)
KETONES UR-MCNC: NEGATIVE — SIGNIFICANT CHANGE UP
LEUKOCYTE ESTERASE UR-ACNC: NEGATIVE — SIGNIFICANT CHANGE UP
MCHC RBC-ENTMCNC: 29.6 PG — SIGNIFICANT CHANGE UP (ref 27–34)
MCHC RBC-ENTMCNC: 32.8 GM/DL — SIGNIFICANT CHANGE UP (ref 32–36)
MCV RBC AUTO: 90.1 FL — SIGNIFICANT CHANGE UP (ref 80–100)
NITRITE UR-MCNC: NEGATIVE — SIGNIFICANT CHANGE UP
PH UR: 6.5 — SIGNIFICANT CHANGE UP (ref 5–8)
PLATELET # BLD AUTO: 286 K/UL — SIGNIFICANT CHANGE UP (ref 150–400)
POTASSIUM SERPL-MCNC: 4.3 MMOL/L — SIGNIFICANT CHANGE UP (ref 3.5–5.3)
POTASSIUM SERPL-SCNC: 4.3 MMOL/L — SIGNIFICANT CHANGE UP (ref 3.5–5.3)
PROT UR-MCNC: NEGATIVE MG/DL — SIGNIFICANT CHANGE UP
RBC # BLD: 4.46 M/UL — SIGNIFICANT CHANGE UP (ref 3.8–5.2)
RBC # FLD: 15.5 % — HIGH (ref 10.3–14.5)
SODIUM SERPL-SCNC: 140 MMOL/L — SIGNIFICANT CHANGE UP (ref 135–145)
SP GR SPEC: 1.01 — LOW (ref 1.01–1.02)
SPECIMEN SOURCE: SIGNIFICANT CHANGE UP
SPECIMEN SOURCE: SIGNIFICANT CHANGE UP
UROBILINOGEN FLD QL: NEGATIVE MG/DL — SIGNIFICANT CHANGE UP
WBC # BLD: 16.22 K/UL — HIGH (ref 3.8–10.5)
WBC # FLD AUTO: 16.22 K/UL — HIGH (ref 3.8–10.5)

## 2018-02-21 RX ADMIN — RISPERIDONE 1 MILLIGRAM(S): 4 TABLET ORAL at 20:46

## 2018-02-21 RX ADMIN — HEPARIN SODIUM 5000 UNIT(S): 5000 INJECTION INTRAVENOUS; SUBCUTANEOUS at 06:10

## 2018-02-21 RX ADMIN — Medication 30 MILLIGRAM(S): at 06:10

## 2018-02-21 RX ADMIN — Medication 3 MILLILITER(S): at 13:45

## 2018-02-21 RX ADMIN — Medication 3 MILLILITER(S): at 22:55

## 2018-02-21 RX ADMIN — Medication 0.5 MILLIGRAM(S): at 17:59

## 2018-02-21 RX ADMIN — Medication 30 MILLIGRAM(S): at 17:59

## 2018-02-21 RX ADMIN — Medication 50 MICROGRAM(S): at 06:10

## 2018-02-21 RX ADMIN — Medication 3 MILLILITER(S): at 00:00

## 2018-02-21 RX ADMIN — HEPARIN SODIUM 5000 UNIT(S): 5000 INJECTION INTRAVENOUS; SUBCUTANEOUS at 20:46

## 2018-02-21 RX ADMIN — Medication 0.25 MILLIGRAM(S): at 06:16

## 2018-02-21 RX ADMIN — HEPARIN SODIUM 5000 UNIT(S): 5000 INJECTION INTRAVENOUS; SUBCUTANEOUS at 13:45

## 2018-02-21 RX ADMIN — FAMOTIDINE 20 MILLIGRAM(S): 10 INJECTION INTRAVENOUS at 13:45

## 2018-02-21 RX ADMIN — RISPERIDONE 1 MILLIGRAM(S): 4 TABLET ORAL at 08:59

## 2018-02-21 RX ADMIN — Medication 1 MILLIGRAM(S): at 20:46

## 2018-02-21 RX ADMIN — Medication 3 MILLILITER(S): at 17:59

## 2018-02-21 RX ADMIN — Medication 0.5 MILLIGRAM(S): at 06:10

## 2018-02-21 RX ADMIN — Medication 3 MILLILITER(S): at 06:10

## 2018-02-22 ENCOUNTER — TRANSCRIPTION ENCOUNTER (OUTPATIENT)
Age: 68
End: 2018-02-22

## 2018-02-22 VITALS — OXYGEN SATURATION: 90 % | HEART RATE: 95 BPM

## 2018-02-22 LAB
CULTURE RESULTS: SIGNIFICANT CHANGE UP
HCT VFR BLD CALC: 38.3 % — SIGNIFICANT CHANGE UP (ref 34.5–45)
HGB BLD-MCNC: 12.5 G/DL — SIGNIFICANT CHANGE UP (ref 11.5–15.5)
MCHC RBC-ENTMCNC: 30 PG — SIGNIFICANT CHANGE UP (ref 27–34)
MCHC RBC-ENTMCNC: 32.6 GM/DL — SIGNIFICANT CHANGE UP (ref 32–36)
MCV RBC AUTO: 92.1 FL — SIGNIFICANT CHANGE UP (ref 80–100)
PLATELET # BLD AUTO: 258 K/UL — SIGNIFICANT CHANGE UP (ref 150–400)
RBC # BLD: 4.16 M/UL — SIGNIFICANT CHANGE UP (ref 3.8–5.2)
RBC # FLD: 15.3 % — HIGH (ref 10.3–14.5)
SPECIMEN SOURCE: SIGNIFICANT CHANGE UP
WBC # BLD: 13.13 K/UL — HIGH (ref 3.8–10.5)
WBC # FLD AUTO: 13.13 K/UL — HIGH (ref 3.8–10.5)

## 2018-02-22 PROCEDURE — 84436 ASSAY OF TOTAL THYROXINE: CPT

## 2018-02-22 PROCEDURE — 84480 ASSAY TRIIODOTHYRONINE (T3): CPT

## 2018-02-22 PROCEDURE — 87633 RESP VIRUS 12-25 TARGETS: CPT

## 2018-02-22 PROCEDURE — 80053 COMPREHEN METABOLIC PANEL: CPT

## 2018-02-22 PROCEDURE — 81003 URINALYSIS AUTO W/O SCOPE: CPT

## 2018-02-22 PROCEDURE — 82803 BLOOD GASES ANY COMBINATION: CPT

## 2018-02-22 PROCEDURE — 84145 PROCALCITONIN (PCT): CPT

## 2018-02-22 PROCEDURE — 84295 ASSAY OF SERUM SODIUM: CPT

## 2018-02-22 PROCEDURE — 84132 ASSAY OF SERUM POTASSIUM: CPT

## 2018-02-22 PROCEDURE — 87581 M.PNEUMON DNA AMP PROBE: CPT

## 2018-02-22 PROCEDURE — 94660 CPAP INITIATION&MGMT: CPT

## 2018-02-22 PROCEDURE — 99291 CRITICAL CARE FIRST HOUR: CPT | Mod: 25

## 2018-02-22 PROCEDURE — 87040 BLOOD CULTURE FOR BACTERIA: CPT

## 2018-02-22 PROCEDURE — 84443 ASSAY THYROID STIM HORMONE: CPT

## 2018-02-22 PROCEDURE — 93005 ELECTROCARDIOGRAM TRACING: CPT

## 2018-02-22 PROCEDURE — 84484 ASSAY OF TROPONIN QUANT: CPT

## 2018-02-22 PROCEDURE — 85027 COMPLETE CBC AUTOMATED: CPT

## 2018-02-22 PROCEDURE — 82962 GLUCOSE BLOOD TEST: CPT

## 2018-02-22 PROCEDURE — 87086 URINE CULTURE/COLONY COUNT: CPT

## 2018-02-22 PROCEDURE — 80048 BASIC METABOLIC PNL TOTAL CA: CPT

## 2018-02-22 PROCEDURE — 85610 PROTHROMBIN TIME: CPT

## 2018-02-22 PROCEDURE — 85379 FIBRIN DEGRADATION QUANT: CPT

## 2018-02-22 PROCEDURE — 87486 CHLMYD PNEUM DNA AMP PROBE: CPT

## 2018-02-22 PROCEDURE — 82947 ASSAY GLUCOSE BLOOD QUANT: CPT

## 2018-02-22 PROCEDURE — 85730 THROMBOPLASTIN TIME PARTIAL: CPT

## 2018-02-22 PROCEDURE — 71045 X-RAY EXAM CHEST 1 VIEW: CPT

## 2018-02-22 PROCEDURE — 94640 AIRWAY INHALATION TREATMENT: CPT

## 2018-02-22 PROCEDURE — 87798 DETECT AGENT NOS DNA AMP: CPT

## 2018-02-22 PROCEDURE — 83605 ASSAY OF LACTIC ACID: CPT

## 2018-02-22 PROCEDURE — 83880 ASSAY OF NATRIURETIC PEPTIDE: CPT

## 2018-02-22 PROCEDURE — 85014 HEMATOCRIT: CPT

## 2018-02-22 PROCEDURE — 82435 ASSAY OF BLOOD CHLORIDE: CPT

## 2018-02-22 PROCEDURE — 82330 ASSAY OF CALCIUM: CPT

## 2018-02-22 PROCEDURE — 36600 WITHDRAWAL OF ARTERIAL BLOOD: CPT

## 2018-02-22 PROCEDURE — 80307 DRUG TEST PRSMV CHEM ANLYZR: CPT

## 2018-02-22 RX ORDER — LEVOTHYROXINE SODIUM 125 MCG
1 TABLET ORAL
Qty: 0 | Refills: 0 | COMMUNITY
Start: 2018-02-22

## 2018-02-22 RX ORDER — RANITIDINE HYDROCHLORIDE 150 MG/1
1 TABLET, FILM COATED ORAL
Qty: 0 | Refills: 0 | COMMUNITY

## 2018-02-22 RX ORDER — IPRATROPIUM/ALBUTEROL SULFATE 18-103MCG
3 AEROSOL WITH ADAPTER (GRAM) INHALATION
Qty: 3 | Refills: 0 | OUTPATIENT
Start: 2018-02-22 | End: 2018-03-23

## 2018-02-22 RX ORDER — LEVOTHYROXINE SODIUM 125 MCG
1 TABLET ORAL
Qty: 30 | Refills: 0
Start: 2018-02-22 | End: 2018-03-23

## 2018-02-22 RX ORDER — FLUTICASONE PROPIONATE AND SALMETEROL 50; 250 UG/1; UG/1
1 POWDER ORAL; RESPIRATORY (INHALATION)
Qty: 1 | Refills: 0 | OUTPATIENT
Start: 2018-02-22 | End: 2018-02-28

## 2018-02-22 RX ORDER — LEVOTHYROXINE SODIUM 125 MCG
1 TABLET ORAL
Qty: 0 | Refills: 0 | COMMUNITY

## 2018-02-22 RX ORDER — RISPERIDONE 4 MG/1
1 TABLET ORAL
Qty: 60 | Refills: 0 | OUTPATIENT
Start: 2018-02-22 | End: 2018-03-23

## 2018-02-22 RX ORDER — RISPERIDONE 4 MG/1
1 TABLET ORAL
Qty: 0 | Refills: 0 | COMMUNITY

## 2018-02-22 RX ORDER — BUDESONIDE AND FORMOTEROL FUMARATE DIHYDRATE 160; 4.5 UG/1; UG/1
2 AEROSOL RESPIRATORY (INHALATION)
Qty: 1 | Refills: 0 | OUTPATIENT
Start: 2018-02-22 | End: 2018-02-28

## 2018-02-22 RX ORDER — RANITIDINE HYDROCHLORIDE 150 MG/1
1 TABLET, FILM COATED ORAL
Qty: 30 | Refills: 0
Start: 2018-02-22 | End: 2018-03-23

## 2018-02-22 RX ADMIN — Medication 50 MICROGRAM(S): at 05:12

## 2018-02-22 RX ADMIN — HEPARIN SODIUM 5000 UNIT(S): 5000 INJECTION INTRAVENOUS; SUBCUTANEOUS at 05:16

## 2018-02-22 RX ADMIN — Medication 0.5 MILLIGRAM(S): at 17:50

## 2018-02-22 RX ADMIN — Medication 3 MILLILITER(S): at 17:50

## 2018-02-22 RX ADMIN — Medication 30 MILLIGRAM(S): at 05:11

## 2018-02-22 RX ADMIN — Medication 3 MILLILITER(S): at 11:51

## 2018-02-22 RX ADMIN — Medication 20 MILLIGRAM(S): at 17:50

## 2018-02-22 RX ADMIN — RISPERIDONE 1 MILLIGRAM(S): 4 TABLET ORAL at 08:20

## 2018-02-22 RX ADMIN — HEPARIN SODIUM 5000 UNIT(S): 5000 INJECTION INTRAVENOUS; SUBCUTANEOUS at 12:05

## 2018-02-22 RX ADMIN — Medication 0.5 MILLIGRAM(S): at 05:12

## 2018-02-22 RX ADMIN — FAMOTIDINE 20 MILLIGRAM(S): 10 INJECTION INTRAVENOUS at 11:51

## 2018-02-22 RX ADMIN — Medication 3 MILLILITER(S): at 05:12

## 2018-02-22 NOTE — PROGRESS NOTE ADULT - SUBJECTIVE AND OBJECTIVE BOX
Patient is a 68y old  Female who presents with a chief complaint of She has been having some cough and SOB per HCP (2018 18:09)      SUBJECTIVE / OVERNIGHT EVENTS:   Feels better.  Denies CP/SOB/Palpitation/HA.    MEDICATIONS  (STANDING):  ALBUTerol/ipratropium for Nebulization 3 milliLiter(s) Nebulizer every 6 hours  buDESOnide   0.5 milliGRAM(s) Respule 0.5 milliGRAM(s) Inhalation two times a day  famotidine    Tablet 20 milliGRAM(s) Oral daily  heparin  Injectable 5000 Unit(s) SubCutaneous every 8 hours  levothyroxine 50 MICROGram(s) Oral daily  LORazepam     Tablet 1 milliGRAM(s) Oral at bedtime  methylPREDNISolone sodium succinate Injectable 20 milliGRAM(s) IV Push three times a day  risperiDONE   Solution 1 milliGRAM(s) Oral two times a day    MEDICATIONS  (PRN):  acetaminophen   Tablet 650 milliGRAM(s) Oral every 6 hours PRN For Temp greater than 38.5 C (101.3 F)        CAPILLARY BLOOD GLUCOSE        I&O's Summary    2018 07:  -  2018 07:00  --------------------------------------------------------  IN: 0 mL / OUT: 300 mL / NET: -300 mL    2018 07:01  -  2018 23:08  --------------------------------------------------------  IN: 540 mL / OUT: 200 mL / NET: 340 mL        PHYSICAL EXAM:    NECK: Supple, No JVD  CHEST/LUNG: Clear to auscultation bilaterally; No wheezing.  HEART: Regular rate and rhythm; No murmurs, rubs, or gallops  ABDOMEN: Soft, Nontender, Nondistended; Bowel sounds present  EXTREMITIES:   No clubbing, cyanosis, or edema  NEUROLOGY: AAO X 3  SKIN: No rashes    LABS:                        12.5   5.91  )-----------( 238      ( 2018 08:56 )             37.3         142  |  109<H>  |  18  ----------------------------<  134<H>  4.3   |  20<L>  |  0.82    Ca    9.2      2018 08:45    TPro  7.4  /  Alb  4.0  /  TBili  0.2  /  DBili  x   /  AST  21  /  ALT  19  /  AlkPhos  88      PT/INR - ( 2018 13:58 )   PT: 11.5 sec;   INR: 1.05 ratio         PTT - ( 2018 13:58 )  PTT:30.0 sec  CARDIAC MARKERS ( 2018 13:58 )  x     / <0.01 ng/mL / x     / x     / x          Urinalysis Basic - ( 2018 13:58 )    Color: PL Yellow / Appearance: Clear / S.013 / pH: x  Gluc: x / Ketone: Negative  / Bili: Negative / Urobili: Negative   Blood: x / Protein: Negative / Nitrite: Negative   Leuk Esterase: Negative / RBC: x / WBC x   Sq Epi: x / Non Sq Epi: x / Bacteria: x      CAPILLARY BLOOD GLUCOSE         @ 17:15  Culture-urine --  Culture results   No growth to date.  method type --  Organism --  Organism Identification --  Specimen source .Blood Blood-Peripheral            @ 17:15  Culture blood --  Culture results   No growth to date.  Gram stain --  Gram stain blood --  Method type --  Organism --  Organism identification --  Specimen source .Blood Blood-Peripheral      RADIOLOGY & ADDITIONAL TESTS:    Imaging Personally Reviewed:    Consultant(s) Notes Reviewed:      Care Discussed with Consultants/Other Providers:
Follow-up Pulm Progress Note  Peng Murray MD  [phone]    No new respiratory events overnight.    breathing comfortably on RA  Denies SOB  Wheezing/cough largely resolved    Vital Signs Last 24 Hrs  T(C): 36.5 (22 Feb 2018 08:19), Max: 36.9 (21 Feb 2018 15:45)  T(F): 97.7 (22 Feb 2018 08:19), Max: 98.5 (21 Feb 2018 15:45)  HR: 78 (22 Feb 2018 09:05) (75 - 93)  BP: 118/73 (22 Feb 2018 08:19) (118/73 - 127/76)  BP(mean): --  RR: 21 (22 Feb 2018 08:19) (21 - 24)  SpO2: 95% (22 Feb 2018 09:05) (94% - 95%)                          12.5   13.13 )-----------( 258      ( 22 Feb 2018 09:00 )             38.3       02-21    140  |  102  |  22  ----------------------------<  99  4.3   |  23  |  1.03    Ca    9.3      21 Feb 2018 09:09        CULTURES:  Culture Results:   No growth to date. (02-16 @ 17:15)  Culture Results:   No growth to date. (02-16 @ 17:15)  Culture Results:   No growth (02-16 @ 17:04)    Most recent blood culture -- 02-16 @ 17:15   -- -- .Blood Blood-Peripheral 02-16 @ 17:15  Most recent blood culture -- 02-16 @ 17:04   -- -- .Urine Catheterized 02-16 @ 17:04      Physical Examination:  PULM: Without sign wheeze or rhonchi  CVS: Regular rate and rhythm, no murmurs, rubs, or gallops  ABD: Soft, non-tender  EXT:  No clubbing, cyanosis, or edema    RADIOLOGY REVIEWED  CXR: Prob small effusions    CT chest:    TTE:
Follow-up Pulm Progress Note  Peng Murray MD  [phone]    No new respiratory events overnight.    Feels much better: breathing comfortably on RA  Wheezing/cough largely resolved  Anxious initially      Vital Signs Last 24 Hrs  T(C): 37.1 (21 Feb 2018 08:07), Max: 37.1 (21 Feb 2018 08:07)  T(F): 98.8 (21 Feb 2018 08:07), Max: 98.8 (21 Feb 2018 08:07)  HR: 74 (21 Feb 2018 08:56) (73 - 100)  BP: 122/72 (21 Feb 2018 08:07) (122/72 - 180/102)  BP(mean): --  RR: 22 (21 Feb 2018 08:07) (22 - 42)  SpO2: 95% (21 Feb 2018 08:56) (93% - 96%)                          13.2   16.22 )-----------( 286      ( 21 Feb 2018 09:11 )             40.2       02-21    140  |  102  |  22  ----------------------------<  99  4.3   |  23  |  1.03    Ca    9.3      21 Feb 2018 09:09        CULTURES:  Culture Results:   No growth to date. (02-16 @ 17:15)  Culture Results:   No growth to date. (02-16 @ 17:15)  Culture Results:   No growth (02-16 @ 17:04)    Most recent blood culture -- 02-16 @ 17:15   -- -- .Blood Blood-Peripheral 02-16 @ 17:15  Most recent blood culture -- 02-16 @ 17:04   -- -- .Urine Catheterized 02-16 @ 17:04      Physical Examination:  PULM: Without sign wheeze or rhonchi  CVS: Regular rate and rhythm, no murmurs, rubs, or gallops  ABD: Soft, non-tender  EXT:  No clubbing, cyanosis, or edema    RADIOLOGY REVIEWED  CXR: Prob small effusions    CT chest:    TTE:
Follow-up Pulm Progress Note  Peng Murray MD  [phone]    No new respiratory events overnight.    Feels much better: breathing comfortably on ventimask  Wheezing/cough largely resolved    Medications:  Vital Signs Last 24 Hrs  T(C): 37.1 (20 Feb 2018 08:39), Max: 37.4 (19 Feb 2018 16:20)  T(F): 98.8 (20 Feb 2018 08:39), Max: 99.3 (19 Feb 2018 16:20)  HR: 78 (20 Feb 2018 09:07) (67 - 85)  BP: 122/70 (20 Feb 2018 08:39) (122/70 - 144/80)  BP(mean): --  RR: 18 (20 Feb 2018 08:39) (16 - 20)  SpO2: 96% (20 Feb 2018 09:07) (93% - 98%)  ABG - ( 19 Feb 2018 00:23 )  pH: 7.41  /  pCO2: 38    /  pO2: 186   / HCO3: 24    / Base Excess: -.1   /  SaO2: 99          02-19 @ 07:01  -  02-20 @ 07:00  --------------------------------------------------------  IN: 2330 mL / OUT: 0 mL / NET: 2330 mL        LABS:                        11.9   13.90 )-----------( 253      ( 20 Feb 2018 07:15 )             36.3     02-20    140  |  106  |  23  ----------------------------<  137<H>  4.6   |  22  |  0.73    Ca    8.9      20 Feb 2018 07:17    CULTURES:  Culture Results:   No growth to date. (02-16 @ 17:15)  Culture Results:   No growth to date. (02-16 @ 17:15)  Culture Results:   No growth (02-16 @ 17:04)    Most recent blood culture -- 02-16 @ 17:15   -- -- .Blood Blood-Peripheral 02-16 @ 17:15  Most recent blood culture -- 02-16 @ 17:04   -- -- .Urine Catheterized 02-16 @ 17:04      Physical Examination:  PULM: Without sign wheeze or rhonchi  CVS: Regular rate and rhythm, no murmurs, rubs, or gallops  ABD: Soft, non-tender  EXT:  No clubbing, cyanosis, or edema    RADIOLOGY REVIEWED  CXR: Prob small effusions    CT chest:    TTE:
MEDICINE, PROGRESS NOTE [phone]    MERCY SHEPPARD 68y [id number]    Patient seen and examined.  Patient is a 68y old  Female who presents with a chief complaint of She has been having some cough and SOB per HCP (16 Feb 2018 18:09)  Pt in bed confused asking where she is.    PAST MEDICAL & SURGICAL HISTORY:  Hypothyroid  Upper GI bleed  Paranoid schizophrenia  UTI (urinary tract infection)  Alzheimer's dementia  No significant past surgical history    MEDICATIONS  (STANDING):  ALBUTerol/ipratropium for Nebulization 3 milliLiter(s) Nebulizer every 6 hours  buDESOnide   0.5 milliGRAM(s) Respule 0.5 milliGRAM(s) Inhalation two times a day  famotidine    Tablet 20 milliGRAM(s) Oral daily  heparin  Injectable 5000 Unit(s) SubCutaneous every 8 hours  levothyroxine 50 MICROGram(s) Oral daily  LORazepam     Tablet 1 milliGRAM(s) Oral at bedtime  predniSONE   Tablet 30 milliGRAM(s) Oral two times a day  predniSONE   Tablet   Oral   risperiDONE   Solution 1 milliGRAM(s) Oral two times a day    MEDICATIONS  (PRN):  acetaminophen   Tablet 650 milliGRAM(s) Oral every 6 hours PRN For Temp greater than 38.5 C (101.3 F)    Allergies    No Known Allergies    Intolerances        PHYSICAL EXAM:  Constitutional: NAD  HEENT: Normocephalic, EOMI  Neck:  No JVD  Respiratory: CTA B/L, No wheezes  Cardiovascular: S1, S2, RRR, + systolic murmur  Gastrointestinal: BS+, soft, NT/ND  Extremities: No peripheral edema  Neurological: AAOX3, no focal deficits  Psychiatric: Normal mood, normal affect  : No Lopez    Vital Signs Last 24 Hrs  T(C): 36.6 (20 Feb 2018 16:50), Max: 37.1 (20 Feb 2018 08:39)  T(F): 97.8 (20 Feb 2018 16:50), Max: 98.8 (20 Feb 2018 08:39)  HR: 96 (20 Feb 2018 16:50) (67 - 100)  BP: 136/75 (20 Feb 2018 16:50) (122/70 - 180/102)  BP(mean): --  RR: 40 (20 Feb 2018 16:50) (16 - 40)  SpO2: 93% (20 Feb 2018 16:50) (93% - 97%)  I&O's Summary    19 Feb 2018 07:01  -  20 Feb 2018 07:00  --------------------------------------------------------  IN: 2330 mL / OUT: 0 mL / NET: 2330 mL    20 Feb 2018 07:01  -  20 Feb 2018 21:34  --------------------------------------------------------  IN: 1259 mL / OUT: 0 mL / NET: 1259 mL        LABS:                        11.9   13.90 )-----------( 253      ( 20 Feb 2018 07:15 )             36.3     02-20    140  |  106  |  23  ----------------------------<  137<H>  4.6   |  22  |  0.73    Ca    8.9      20 Feb 2018 07:17
Patient is a 68y old  Female who presents with a chief complaint of She has been having some cough and SOB per HCP (16 Feb 2018 18:09)      SUBJECTIVE / OVERNIGHT EVENTS:   Feels better.  Denies CP/SOB/Palpitation/HA.    MEDICATIONS  (STANDING):  ALBUTerol/ipratropium for Nebulization 3 milliLiter(s) Nebulizer every 6 hours  buDESOnide   0.5 milliGRAM(s) Respule 0.5 milliGRAM(s) Inhalation two times a day  famotidine    Tablet 20 milliGRAM(s) Oral daily  heparin  Injectable 5000 Unit(s) SubCutaneous every 8 hours  levothyroxine 50 MICROGram(s) Oral daily  LORazepam     Tablet 1 milliGRAM(s) Oral at bedtime  methylPREDNISolone sodium succinate Injectable 20 milliGRAM(s) IV Push three times a day  risperiDONE   Solution 1 milliGRAM(s) Oral two times a day  sodium chloride 0.9%. 1000 milliLiter(s) (75 mL/Hr) IV Continuous <Continuous>    MEDICATIONS  (PRN):  acetaminophen   Tablet 650 milliGRAM(s) Oral every 6 hours PRN For Temp greater than 38.5 C (101.3 F)        CAPILLARY BLOOD GLUCOSE        I&O's Summary    18 Feb 2018 07:01  -  19 Feb 2018 07:00  --------------------------------------------------------  IN: 1020 mL / OUT: 200 mL / NET: 820 mL    19 Feb 2018 07:01  -  19 Feb 2018 16:22  --------------------------------------------------------  IN: 950 mL / OUT: 0 mL / NET: 950 mL        PHYSICAL EXAM:  GENERAL: NAD, well-developed  HEAD:  Atraumatic, Normocephalic  NECK: Supple, No JVD  CHEST/LUNG: Clear to auscultation bilaterally; No wheezing.  HEART: Regular rate and rhythm; No murmurs, rubs, or gallops  ABDOMEN: Soft, Nontender, Nondistended; Bowel sounds present  EXTREMITIES:   No clubbing, cyanosis, or edema  NEUROLOGY: AAO X 3  SKIN: No rashes    LABS:                        12.7   11.81 )-----------( 247      ( 19 Feb 2018 09:05 )             38.6     02-19    139  |  103  |  20  ----------------------------<  162<H>  4.3   |  20<L>  |  0.78    Ca    9.2      19 Feb 2018 09:16              CAPILLARY BLOOD GLUCOSE        02-16 @ 17:15  Culture-urine --  Culture results   No growth to date.  method type --  Organism --  Organism Identification --  Specimen source .Blood Blood-Peripheral  02-16 @ 17:04  Culture-urine --  Culture results   No growth  method type --  Organism --  Organism Identification --  Specimen source .Urine Catheterized           02-16 @ 17:15  Culture blood --  Culture results   No growth to date.  Gram stain --  Gram stain blood --  Method type --  Organism --  Organism identification --  Specimen source .Blood Blood-Peripheral   02-16 @ 17:04  Culture blood --  Culture results   No growth  Gram stain --  Gram stain blood --  Method type --  Organism --  Organism identification --  Specimen source .Urine Catheterized      RADIOLOGY & ADDITIONAL TESTS:    Imaging Personally Reviewed:    Consultant(s) Notes Reviewed:      Care Discussed with Consultants/Other Providers:
Patient is a 68y old  Female who presents with a chief complaint of She has been having some cough and SOB per HCP (16 Feb 2018 18:09)      SUBJECTIVE / OVERNIGHT EVENTS:  On suppl O2.  Denies CP/Palpitation/HA.    MEDICATIONS  (STANDING):  ALBUTerol/ipratropium for Nebulization 3 milliLiter(s) Nebulizer every 6 hours  buDESOnide   0.5 milliGRAM(s) Respule 0.5 milliGRAM(s) Inhalation two times a day  famotidine    Tablet 20 milliGRAM(s) Oral daily  heparin  Injectable 5000 Unit(s) SubCutaneous every 8 hours  levothyroxine 50 MICROGram(s) Oral daily  LORazepam     Tablet 1 milliGRAM(s) Oral at bedtime  methylPREDNISolone sodium succinate Injectable 20 milliGRAM(s) IV Push three times a day  risperiDONE   Solution 1 milliGRAM(s) Oral two times a day    MEDICATIONS  (PRN):  acetaminophen   Tablet 650 milliGRAM(s) Oral every 6 hours PRN For Temp greater than 38.5 C (101.3 F)        CAPILLARY BLOOD GLUCOSE        I&O's Summary    17 Feb 2018 07:01  -  18 Feb 2018 07:00  --------------------------------------------------------  IN: 640 mL / OUT: 200 mL / NET: 440 mL    18 Feb 2018 07:01  -  18 Feb 2018 21:09  --------------------------------------------------------  IN: 1020 mL / OUT: 200 mL / NET: 820 mL        PHYSICAL EXAM:  GENERAL: NAD, well-developed  HEAD:  Atraumatic, Normocephalic  NECK: Supple, No JVD  CHEST/LUNG: Clear to auscultation bilaterally; No wheezing.  HEART: Regular rate and rhythm; No murmurs, rubs, or gallops  ABDOMEN: Soft, Nontender, Nondistended; Bowel sounds present  EXTREMITIES:   No clubbing, cyanosis, or edema  NEUROLOGY: AAO   SKIN: No rashes    LABS:                        12.5   5.91  )-----------( 238      ( 17 Feb 2018 08:56 )             37.3     02-18    144  |  109<H>  |  21  ----------------------------<  131<H>  4.3   |  21<L>  |  0.80    Ca    9.2      18 Feb 2018 08:29              CAPILLARY BLOOD GLUCOSE        02-16 @ 17:15  Culture-urine --  Culture results   No growth to date.  method type --  Organism --  Organism Identification --  Specimen source .Blood Blood-Peripheral  02-16 @ 17:04  Culture-urine --  Culture results   No growth  method type --  Organism --  Organism Identification --  Specimen source .Urine Catheterized           02-16 @ 17:15  Culture blood --  Culture results   No growth to date.  Gram stain --  Gram stain blood --  Method type --  Organism --  Organism identification --  Specimen source .Blood Blood-Peripheral   02-16 @ 17:04  Culture blood --  Culture results   No growth  Gram stain --  Gram stain blood --  Method type --  Organism --  Organism identification --  Specimen source .Urine Catheterized      RADIOLOGY & ADDITIONAL TESTS:    Imaging Personally Reviewed:    Consultant(s) Notes Reviewed:      Care Discussed with Consultants/Other Providers:
Patient is a 68y old  Female who presents with a chief complaint of She has been having some cough and SOB per HCP (2018 18:09)      SUBJECTIVE / OVERNIGHT EVENTS:   Feels better.  Denies CP/SOB/Palpitation/HA.    MEDICATIONS  (STANDING):  ALBUTerol/ipratropium for Nebulization 3 milliLiter(s) Nebulizer every 6 hours  buDESOnide   0.5 milliGRAM(s) Respule 0.5 milliGRAM(s) Inhalation two times a day  famotidine    Tablet 20 milliGRAM(s) Oral daily  heparin  Injectable 5000 Unit(s) SubCutaneous every 8 hours  levothyroxine 50 MICROGram(s) Oral daily  LORazepam     Tablet 1 milliGRAM(s) Oral at bedtime  predniSONE   Tablet 30 milliGRAM(s) Oral two times a day  predniSONE   Tablet   Oral   risperiDONE   Solution 1 milliGRAM(s) Oral two times a day    MEDICATIONS  (PRN):  acetaminophen   Tablet 650 milliGRAM(s) Oral every 6 hours PRN For Temp greater than 38.5 C (101.3 F)        CAPILLARY BLOOD GLUCOSE        I&O's Summary    2018 07:  -  2018 07:00  --------------------------------------------------------  IN: 1259 mL / OUT: 0 mL / NET: 1259 mL    2018 07:01  -  2018 17:55  --------------------------------------------------------  IN: 640 mL / OUT: 0 mL / NET: 640 mL        PHYSICAL EXAM:  GENERAL: NAD, well-developed  HEAD:  Atraumatic, Normocephalic  NECK: Supple, No JVD  CHEST/LUNG: Clear to auscultation bilaterally; No wheezing.  HEART: Regular rate and rhythm; No murmurs, rubs, or gallops  ABDOMEN: Soft, Nontender, Nondistended; Bowel sounds present  EXTREMITIES:   No clubbing, cyanosis, or edema  NEUROLOGY: AAO X 3  SKIN: No rashes    LABS:                        13.2   16.22 )-----------( 286      ( 2018 09:11 )             40.2         140  |  102  |  22  ----------------------------<  99  4.3   |  23  |  1.03    Ca    9.3      2018 09:09            Urinalysis Basic - ( 2018 16:13 )    Color: Yellow / Appearance: Clear / S.009 / pH: x  Gluc: x / Ketone: Negative  / Bili: Negative / Urobili: Negative mg/dL   Blood: x / Protein: Negative mg/dL / Nitrite: Negative   Leuk Esterase: Negative / RBC: x / WBC x   Sq Epi: x / Non Sq Epi: x / Bacteria: x      CAPILLARY BLOOD GLUCOSE         @ 13:16  Culture-urine --  Culture results   No growth to date.  method type --  Organism --  Organism Identification --  Specimen source .Blood Blood-Peripheral   @ 17:15  Culture-urine --  Culture results   No growth to date.  method type --  Organism --  Organism Identification --  Specimen source .Blood Blood-Peripheral   @ 17:04  Culture-urine --  Culture results   No growth  method type --  Organism --  Organism Identification --  Specimen source .Urine Catheterized            @ 13:16  Culture blood --  Culture results   No growth to date.  Gram stain --  Gram stain blood --  Method type --  Organism --  Organism identification --  Specimen source .Blood Blood-Peripheral    @ 17:15  Culture blood --  Culture results   No growth to date.  Gram stain --  Gram stain blood --  Method type --  Organism --  Organism identification --  Specimen source .Blood Blood-Peripheral    @ 17:04  Culture blood --  Culture results   No growth  Gram stain --  Gram stain blood --  Method type --  Organism --  Organism identification --  Specimen source .Urine Catheterized      RADIOLOGY & ADDITIONAL TESTS:    Imaging Personally Reviewed:    Consultant(s) Notes Reviewed:      Care Discussed with Consultants/Other Providers:

## 2018-02-22 NOTE — DISCHARGE NOTE ADULT - PATIENT PORTAL LINK FT
You can access the [facility] Patient Portal, offered by [facility], by registering with the following website: [email or web address]

## 2018-02-22 NOTE — PATIENT PROFILE ADULT. - TEACHING/LEARNING ADDITIONAL COMMENTS OTHER
pt mother is cantonese speaking, specifically toisan dialect. p t niece and aunt heron are able to learn and are aware of current status and plan of care

## 2018-02-22 NOTE — PROGRESS NOTE ADULT - PROVIDER SPECIALTY LIST ADULT
Internal Medicine
Internal Medicine
Pulmonology
Internal Medicine

## 2018-02-22 NOTE — DISCHARGE NOTE ADULT - CARE PLAN
Principal Discharge DX:	RSV bronchitis  Goal:	symptoms resolved  Assessment and plan of treatment:	Continue steroid taper   Follow-up with your primary care provider ---call for appointment  Secondary Diagnosis:	Hypothyroidism  Goal:	continue home medication  Secondary Diagnosis:	Paranoid schizophrenia  Goal:	continue home medication  Assessment and plan of treatment:	follow-up with psychiatrist---call for appointment Principal Discharge DX:	RSV bronchitis  Goal:	symptoms resolved  Assessment and plan of treatment:	Continue steroid taper and Symbicort as prescribed.   Follow-up with your primary care provider ---call for appointment  Secondary Diagnosis:	Hypothyroidism  Goal:	continue home medication  Secondary Diagnosis:	Paranoid schizophrenia  Goal:	continue home medication  Assessment and plan of treatment:	follow-up with psychiatrist---call for appointment

## 2018-02-22 NOTE — DISCHARGE NOTE ADULT - MEDICATION SUMMARY - MEDICATIONS TO STOP TAKING
I will STOP taking the medications listed below when I get home from the hospital:    Keflex 500 mg oral capsule  -- 1 cap(s) by mouth 2 times a day   -- Finish all this medication unless otherwise directed by prescriber.    Tamiflu 75 mg oral capsule  -- 1 cap(s) by mouth 2 times a day    Tamiflu 75 mg oral capsule  -- 1 cap(s) by mouth once a day for 14 days

## 2018-02-22 NOTE — DISCHARGE NOTE ADULT - HOSPITAL COURSE
68 F (poor historian) with h/o Alzheimer's dementia, paranoid schizophrenia, hypothyroidism, GI bleed, PVD, ASHD sent from [facility] admitted with RSV bronchitis, pulmonary consulted.... started on IV steroid symptoms now resolved. Pt will continue steroid taper and Symbicort per pulmonary.

## 2018-02-22 NOTE — DISCHARGE NOTE ADULT - PLAN OF CARE
symptoms resolved Continue steroid taper   Follow-up with your primary care provider ---call for appointment continue home medication follow-up with psychiatrist---call for appointment Continue steroid taper and Symbicort as prescribed.   Follow-up with your primary care provider ---call for appointment

## 2018-02-22 NOTE — PROGRESS NOTE ADULT - ASSESSMENT
RSV bronchitis  acute hypoxemic respir failure  alzh dementia  paranoid schizo  hypothyroidism    continue current care  appreciate pulm input  Dw pt's PMD at [facility].
· Assessment	  68 F (poor historian) with h/o Alzheimer's dementia, paranoid schizophrenia, hypothyroidism, GI bleed, PVD, ASHD sent from [facility] because she has been having nonproductive cough with SOB for the last few days. No c/o fever, chest pain.   In ER she remained afebrile, BP has bene stable, , her O2 sat was 89%, VBG 7.4/38/23/89%. she was Put on Bipap. CXR showed no consolidation or Pneumonia/CHF.     WBC 4.9       Problem/Plan - 1:  ·  Problem: Acute hypoxemic respiratory failure.  Plan:   - O2 supplement, Bipap at night PRN.  - bronchodilators, IV solumedrol 20mg q 8 hrs.        Problem/Plan - 2:  ·  Problem: RSV bronchitis.  Plan: RVP positive for RSV, not influenza  - Droplet, contact isolation.      Problem/Plan - 3:  ·  Problem: Hypothyroidism.  Plan: c/w synthroid       Problem/Plan - 4:  ·  Problem: Paranoid schizophrenia.  Plan: Calm now, no behavioral issues.  - c/w Risperidone and ativan at home doses.
· Assessment	  68 F (poor historian) with h/o Alzheimer's dementia, paranoid schizophrenia, hypothyroidism, GI bleed, PVD, ASHD sent from [facility] because she has been having nonproductive cough with SOB for the last few days. No c/o fever, chest pain.   In ER she remained afebrile, BP has bene stable, , her O2 sat was 89%, VBG 7.4/38/23/89%. she was Put on Bipap. CXR showed no consolidation or Pneumonia/CHF.     WBC 4.9       Problem/Plan - 1:  ·  Problem: Acute hypoxemic respiratory failure.  Plan:   - O2 supplement, Bipap at night PRN.  - bronchodilators, IV solumedrol 20mg q 8 hrs for 2-3 days       Problem/Plan - 2:  ·  Problem: RSV bronchitis.  Plan: RVP positive for RSV, not influenza  - d/c [place] and treatment as above  - Droplet, contact isolation.      Problem/Plan - 3:  ·  Problem: Hypothyroidism.  Plan: c/w synthroid       Problem/Plan - 4:  ·  Problem: Paranoid schizophrenia.  Plan: Clam now, no behavioral issues.  - c/w Risperidone and ativan at home doses.
RSV bronchitis  acute hypoxemic respir failure  alzh dementia  paranoid schizo  hypothyroidism    continue current care  psych eval  neuro eval  appreciate pulm input  low threshold to reconsult MICU
RSV positive bronchitis with acute asthma  Hypoxemic resp failure  ? small pleural effusions  No clinical suspicion of bact infection    REC    DC solumedrol: pred taper ordered  Continue nebulizers  Procalcitonin ordered  PBNP ordered
RSV positive bronchitis with acute asthma  Hypoxemic resp failure  ? small pleural effusions  No clinical suspicion of bact infection  Normal PCT and PBNP    REC    Complete  pred taper ordered  Continue nebulizers  DC planning primary team
RSV positive bronchitis with acute asthma  Hypoxemic resp failure  ? small pleural effusions  No clinical suspicion of bact infection  Normal PCT and PBNP    REC    Complete  pred taper ordered  Continue nebulizers  DC planning primary team
· Assessment	  68 F (poor historian) with h/o Alzheimer's dementia, paranoid schizophrenia, hypothyroidism, GI bleed, PVD, ASHD sent from [facility] because she has been having nonproductive cough with SOB for the last few days. No c/o fever, chest pain.   In ER she remained afebrile, BP has bene stable, , her O2 sat was 89%, VBG 7.4/38/23/89%. she was Put on Bipap. CXR showed no consolidation or Pneumonia/CHF.     WBC 4.9       Problem/Plan - 1:  ·  Problem: Acute hypoxemic respiratory failure.  Plan:   - O2 supplement, Bipap at night PRN. Pul eval noted.  - bronchodilators, IV solumedrol 20mg q 8 hrs.        Problem/Plan - 2:  ·  Problem: RSV bronchitis.  Plan: RVP positive for RSV, not influenza  - Droplet, contact isolation.      Problem/Plan - 3:  ·  Problem: Hypothyroidism.  Plan: c/w synthroid       Problem/Plan - 4:  ·  Problem: Paranoid schizophrenia.  Plan: Calm now, no behavioral issues.  - c/w Risperidone and ativan.

## 2018-02-22 NOTE — DISCHARGE NOTE ADULT - MEDICATION SUMMARY - MEDICATIONS TO TAKE
I will START or STAY ON the medications listed below when I get home from the hospital:    predniSONE 10 mg oral tablet  -- 3 tab(s) by mouth 2 times a dayx 2 days   2 tabs by mouth 2 times a day x2 days  3 tabs by mouth 2 times a day x2 days  2 tabs by mouth daily x2 day  1 tab by mouth daily z1 day   -- It is very important that you take or use this exactly as directed.  Do not skip doses or discontinue unless directed by your doctor.  Obtain medical advice before taking any non-prescription drugs as some may affect the action of this medication.  Take with food or milk.    -- Indication: For RSV bronchitis    LORazepam 1 mg oral tablet  -- 1 tab(s) by mouth once a day (at bedtime)  -- Indication: For Paranoid schizophrenia    risperiDONE 1 mg/mL oral solution  -- 1 milliliter(s) by mouth 2 times a day  -- Indication: For Paranoid schizophrenia    ipratropium-albuterol 0.5 mg-2.5 mg/3 mLinhalation solution  -- 3 milliliter(s) inhaled every 6 hours, As Needed  -- Indication: For RSV bronchitis    raNITIdine 150 mg oral capsule  -- 1 cap(s) by mouth once a day  -- Indication: For gerd    levothyroxine 50 mcg (0.05 mg) oral tablet  -- 1 tab(s) by mouth once a day  -- Indication: For Hypothyroid I will START or STAY ON the medications listed below when I get home from the hospital:    predniSONE 10 mg oral tablet  -- 3 tab(s) by mouth 2 times a dayx 2 days   2 tabs by mouth 2 times a day x2 days  3 tabs by mouth 2 times a day x2 days  2 tabs by mouth daily x2 day  1 tab by mouth daily z1 day   -- It is very important that you take or use this exactly as directed.  Do not skip doses or discontinue unless directed by your doctor.  Obtain medical advice before taking any non-prescription drugs as some may affect the action of this medication.  Take with food or milk.    -- Indication: For RSV bronchitis    LORazepam 1 mg oral tablet  -- 1 tab(s) by mouth once a day (at bedtime)  -- Indication: For Paranoid schizophrenia    risperiDONE 1 mg/mL oral solution  -- 1 milliliter(s) by mouth 2 times a day  -- Indication: For Paranoid schizophrenia    Symbicort 80 mcg-4.5 mcg/inh inhalation aerosol  -- 2 puff(s) inhaled 2 times a day   -- Check with your doctor before becoming pregnant.  For inhalation only.  Rinse mouth thoroughly after use.    -- Indication: For RSV bronchitis    raNITIdine 150 mg oral capsule  -- 1 cap(s) by mouth once a day  -- Indication: For gerd    levothyroxine 50 mcg (0.05 mg) oral tablet  -- 1 tab(s) by mouth once a day  -- Indication: For Hypothyroid I will START or STAY ON the medications listed below when I get home from the hospital:    predniSONE 10 mg oral tablet  -- 2 tab(s) by mouth 2 times a day x2 days  3 tabs by mouth daily x2 days  1 tab by mouth daily x1day  -- It is very important that you take or use this exactly as directed.  Do not skip doses or discontinue unless directed by your doctor.  Obtain medical advice before taking any non-prescription drugs as some may affect the action of this medication.  Take with food or milk.    -- Indication: For RSV bronchitis    LORazepam 1 mg oral tablet  -- 1 tab(s) by mouth once a day (at bedtime)  -- Indication: For Paranoid schizophrenia    risperiDONE 1 mg/mL oral solution  -- 1 milliliter(s) by mouth 2 times a day  -- Indication: For Paranoid schizophrenia    Symbicort 80 mcg-4.5 mcg/inh inhalation aerosol  -- 2 puff(s) inhaled 2 times a day   -- Check with your doctor before becoming pregnant.  For inhalation only.  Rinse mouth thoroughly after use.    -- Indication: For RSV bronchitis    raNITIdine 150 mg oral capsule  -- 1 cap(s) by mouth once a day  -- Indication: For gerd    levothyroxine 50 mcg (0.05 mg) oral tablet  -- 1 tab(s) by mouth once a day  -- Indication: For Hypothyroid I will START or STAY ON the medications listed below when I get home from the hospital:    predniSONE 10 mg oral tablet  -- 2 tab(s) by mouth 2 times a day x2 days  3 tabs by mouth daily x2 days  1 tab by mouth daily x 2 days  -- It is very important that you take or use this exactly as directed.  Do not skip doses or discontinue unless directed by your doctor.  Obtain medical advice before taking any non-prescription drugs as some may affect the action of this medication.  Take with food or milk.    -- Indication: For RSV bronchitis    LORazepam 1 mg oral tablet  -- 1 tab(s) by mouth once a day (at bedtime) MDD:1 tab daily  -- Indication: For Paranoid schizophrenia    risperiDONE 1 mg/mL oral solution  -- 1 milliliter(s) by mouth 2 times a day  -- Indication: For Paranoid schizophrenia    Advair Diskus 100 mcg-50 mcg inhalation powder  -- 1 puff(s) inhaled 2 times a day   -- Check with your doctor before becoming pregnant.  For inhalation only.  Obtain medical advice before taking any non-prescription drugs as some may affect the action of this medication.  Rinse mouth thoroughly after use.    -- Indication: For RSV bronchitis    raNITIdine 150 mg oral capsule  -- 1 cap(s) by mouth once a day  -- Indication: For gerd    levothyroxine 50 mcg (0.05 mg) oral tablet  -- 1 tab(s) by mouth once a day  -- Indication: For Hypothyroid

## 2018-02-24 LAB
CULTURE RESULTS: SIGNIFICANT CHANGE UP
CULTURE RESULTS: SIGNIFICANT CHANGE UP
SPECIMEN SOURCE: SIGNIFICANT CHANGE UP
SPECIMEN SOURCE: SIGNIFICANT CHANGE UP

## 2018-02-28 ENCOUNTER — INPATIENT (INPATIENT)
Facility: HOSPITAL | Age: 68
LOS: 11 days | Discharge: NURSING, REHAB OR LONG-TERM CARE | DRG: 871 | End: 2018-03-12
Attending: INTERNAL MEDICINE | Admitting: INTERNAL MEDICINE
Payer: MEDICARE

## 2018-02-28 VITALS
RESPIRATION RATE: 30 BRPM | HEART RATE: 100 BPM | DIASTOLIC BLOOD PRESSURE: 79 MMHG | SYSTOLIC BLOOD PRESSURE: 134 MMHG | OXYGEN SATURATION: 96 %

## 2018-02-28 DIAGNOSIS — J96.01 ACUTE RESPIRATORY FAILURE WITH HYPOXIA: ICD-10-CM

## 2018-02-28 DIAGNOSIS — R06.82 TACHYPNEA, NOT ELSEWHERE CLASSIFIED: ICD-10-CM

## 2018-02-28 DIAGNOSIS — G30.9 ALZHEIMER'S DISEASE, UNSPECIFIED: ICD-10-CM

## 2018-02-28 DIAGNOSIS — E03.9 HYPOTHYROIDISM, UNSPECIFIED: ICD-10-CM

## 2018-02-28 DIAGNOSIS — A41.9 SEPSIS, UNSPECIFIED ORGANISM: ICD-10-CM

## 2018-02-28 DIAGNOSIS — J20.5 ACUTE BRONCHITIS DUE TO RESPIRATORY SYNCYTIAL VIRUS: ICD-10-CM

## 2018-02-28 DIAGNOSIS — Z29.9 ENCOUNTER FOR PROPHYLACTIC MEASURES, UNSPECIFIED: ICD-10-CM

## 2018-02-28 DIAGNOSIS — F20.0 PARANOID SCHIZOPHRENIA: ICD-10-CM

## 2018-02-28 DIAGNOSIS — R09.02 HYPOXEMIA: ICD-10-CM

## 2018-02-28 LAB
APPEARANCE UR: CLEAR — SIGNIFICANT CHANGE UP
APTT BLD: 26.2 SEC — LOW (ref 27.5–37.4)
BASE EXCESS BLDV CALC-SCNC: -1.2 MMOL/L — SIGNIFICANT CHANGE UP (ref -2–2)
BASOPHILS # BLD AUTO: 0 K/UL — SIGNIFICANT CHANGE UP (ref 0–0.2)
BASOPHILS NFR BLD AUTO: 0 % — SIGNIFICANT CHANGE UP (ref 0–2)
BILIRUB UR-MCNC: NEGATIVE — SIGNIFICANT CHANGE UP
CA-I SERPL-SCNC: 1.16 MMOL/L — SIGNIFICANT CHANGE UP (ref 1.12–1.3)
CHLORIDE BLDV-SCNC: 108 MMOL/L — SIGNIFICANT CHANGE UP (ref 96–108)
CK MB CFR SERPL CALC: 2.1 NG/ML — SIGNIFICANT CHANGE UP (ref 0–3.8)
CK SERPL-CCNC: 45 U/L — SIGNIFICANT CHANGE UP (ref 25–170)
CO2 BLDV-SCNC: 23 MMOL/L — SIGNIFICANT CHANGE UP (ref 22–30)
COLOR SPEC: SIGNIFICANT CHANGE UP
DIFF PNL FLD: NEGATIVE — SIGNIFICANT CHANGE UP
EOSINOPHIL # BLD AUTO: 0 K/UL — SIGNIFICANT CHANGE UP (ref 0–0.5)
EOSINOPHIL NFR BLD AUTO: 0 % — SIGNIFICANT CHANGE UP (ref 0–6)
GAS PNL BLDV: 136 MMOL/L — SIGNIFICANT CHANGE UP (ref 136–145)
GAS PNL BLDV: SIGNIFICANT CHANGE UP
GAS PNL BLDV: SIGNIFICANT CHANGE UP
GLUCOSE BLDV-MCNC: 211 MG/DL — HIGH (ref 70–99)
GLUCOSE UR QL: 100 MG/DL
HCO3 BLDV-SCNC: 22 MMOL/L — SIGNIFICANT CHANGE UP (ref 21–29)
HCT VFR BLD CALC: 37.6 % — SIGNIFICANT CHANGE UP (ref 34.5–45)
HCT VFR BLDA CALC: 40 % — SIGNIFICANT CHANGE UP (ref 39–50)
HGB BLD CALC-MCNC: 13.2 G/DL — SIGNIFICANT CHANGE UP (ref 11.5–15.5)
HGB BLD-MCNC: 13 G/DL — SIGNIFICANT CHANGE UP (ref 11.5–15.5)
INR BLD: 0.97 RATIO — SIGNIFICANT CHANGE UP (ref 0.88–1.16)
KETONES UR-MCNC: NEGATIVE — SIGNIFICANT CHANGE UP
LACTATE BLDV-MCNC: 2.9 MMOL/L — HIGH (ref 0.7–2)
LEUKOCYTE ESTERASE UR-ACNC: ABNORMAL
LYMPHOCYTES # BLD AUTO: 0.8 K/UL — LOW (ref 1–3.3)
LYMPHOCYTES # BLD AUTO: 4.4 % — LOW (ref 13–44)
MCHC RBC-ENTMCNC: 31.6 PG — SIGNIFICANT CHANGE UP (ref 27–34)
MCHC RBC-ENTMCNC: 34.4 GM/DL — SIGNIFICANT CHANGE UP (ref 32–36)
MCV RBC AUTO: 91.9 FL — SIGNIFICANT CHANGE UP (ref 80–100)
MONOCYTES # BLD AUTO: 0.4 K/UL — SIGNIFICANT CHANGE UP (ref 0–0.9)
MONOCYTES NFR BLD AUTO: 2.2 % — SIGNIFICANT CHANGE UP (ref 2–14)
NEUTROPHILS # BLD AUTO: 16.2 K/UL — HIGH (ref 1.8–7.4)
NEUTROPHILS NFR BLD AUTO: 93.3 % — HIGH (ref 43–77)
NITRITE UR-MCNC: NEGATIVE — SIGNIFICANT CHANGE UP
PCO2 BLDV: 34 MMHG — LOW (ref 35–50)
PH BLDV: 7.43 — SIGNIFICANT CHANGE UP (ref 7.35–7.45)
PH UR: 6 — SIGNIFICANT CHANGE UP (ref 5–8)
PLAT MORPH BLD: NORMAL — SIGNIFICANT CHANGE UP
PLATELET # BLD AUTO: 266 K/UL — SIGNIFICANT CHANGE UP (ref 150–400)
PO2 BLDV: 61 MMHG — HIGH (ref 25–45)
POTASSIUM BLDV-SCNC: 3.9 MMOL/L — SIGNIFICANT CHANGE UP (ref 3.5–5)
PROT UR-MCNC: NEGATIVE — SIGNIFICANT CHANGE UP
PROTHROM AB SERPL-ACNC: 10.5 SEC — SIGNIFICANT CHANGE UP (ref 9.8–12.7)
RAPID RVP RESULT: DETECTED
RBC # BLD: 4.09 M/UL — SIGNIFICANT CHANGE UP (ref 3.8–5.2)
RBC # FLD: 13.4 % — SIGNIFICANT CHANGE UP (ref 10.3–14.5)
RBC BLD AUTO: SIGNIFICANT CHANGE UP
RBC CASTS # UR COMP ASSIST: SIGNIFICANT CHANGE UP /HPF (ref 0–2)
RSV RNA SPEC QL NAA+PROBE: DETECTED
SALICYLATES SERPL-MCNC: <2 MG/DL — LOW (ref 15–30)
SAO2 % BLDV: 91 % — HIGH (ref 67–88)
SP GR SPEC: 1.02 — SIGNIFICANT CHANGE UP (ref 1.01–1.02)
TROPONIN T SERPL-MCNC: <0.01 NG/ML — SIGNIFICANT CHANGE UP (ref 0–0.06)
UROBILINOGEN FLD QL: NEGATIVE — SIGNIFICANT CHANGE UP
WBC # BLD: 17.3 K/UL — HIGH (ref 3.8–10.5)
WBC # FLD AUTO: 17.3 K/UL — HIGH (ref 3.8–10.5)
WBC UR QL: SIGNIFICANT CHANGE UP /HPF (ref 0–5)

## 2018-02-28 PROCEDURE — 99285 EMERGENCY DEPT VISIT HI MDM: CPT | Mod: 25

## 2018-02-28 PROCEDURE — 71045 X-RAY EXAM CHEST 1 VIEW: CPT | Mod: 26

## 2018-02-28 PROCEDURE — 93010 ELECTROCARDIOGRAM REPORT: CPT

## 2018-02-28 PROCEDURE — 71275 CT ANGIOGRAPHY CHEST: CPT | Mod: 26

## 2018-02-28 PROCEDURE — 99223 1ST HOSP IP/OBS HIGH 75: CPT

## 2018-02-28 RX ORDER — IPRATROPIUM/ALBUTEROL SULFATE 18-103MCG
3 AEROSOL WITH ADAPTER (GRAM) INHALATION ONCE
Qty: 0 | Refills: 0 | Status: COMPLETED | OUTPATIENT
Start: 2018-02-28 | End: 2018-02-28

## 2018-02-28 RX ORDER — FAMOTIDINE 10 MG/ML
20 INJECTION INTRAVENOUS DAILY
Qty: 0 | Refills: 0 | Status: DISCONTINUED | OUTPATIENT
Start: 2018-02-28 | End: 2018-03-07

## 2018-02-28 RX ORDER — RISPERIDONE 4 MG/1
1 TABLET ORAL
Qty: 0 | Refills: 0 | Status: DISCONTINUED | OUTPATIENT
Start: 2018-02-28 | End: 2018-03-12

## 2018-02-28 RX ORDER — CETIRIZINE HYDROCHLORIDE 10 MG/1
5 TABLET ORAL DAILY
Qty: 0 | Refills: 0 | Status: DISCONTINUED | OUTPATIENT
Start: 2018-02-28 | End: 2018-03-12

## 2018-02-28 RX ORDER — HEPARIN SODIUM 5000 [USP'U]/ML
5000 INJECTION INTRAVENOUS; SUBCUTANEOUS EVERY 8 HOURS
Qty: 0 | Refills: 0 | Status: DISCONTINUED | OUTPATIENT
Start: 2018-02-28 | End: 2018-03-12

## 2018-02-28 RX ORDER — IPRATROPIUM/ALBUTEROL SULFATE 18-103MCG
3 AEROSOL WITH ADAPTER (GRAM) INHALATION EVERY 6 HOURS
Qty: 0 | Refills: 0 | Status: DISCONTINUED | OUTPATIENT
Start: 2018-02-28 | End: 2018-03-12

## 2018-02-28 RX ORDER — LEVOTHYROXINE SODIUM 125 MCG
50 TABLET ORAL DAILY
Qty: 0 | Refills: 0 | Status: DISCONTINUED | OUTPATIENT
Start: 2018-02-28 | End: 2018-03-12

## 2018-02-28 RX ADMIN — Medication 3 MILLILITER(S): at 14:44

## 2018-02-28 RX ADMIN — Medication 1 MILLIGRAM(S): at 21:36

## 2018-02-28 RX ADMIN — Medication 125 MILLIGRAM(S): at 15:43

## 2018-02-28 RX ADMIN — Medication 3 MILLILITER(S): at 23:07

## 2018-02-28 RX ADMIN — HEPARIN SODIUM 5000 UNIT(S): 5000 INJECTION INTRAVENOUS; SUBCUTANEOUS at 21:36

## 2018-02-28 RX ADMIN — Medication 3 MILLILITER(S): at 18:26

## 2018-02-28 RX ADMIN — RISPERIDONE 1 MILLIGRAM(S): 4 TABLET ORAL at 21:57

## 2018-02-28 NOTE — ED ADULT NURSE REASSESSMENT NOTE - NS ED NURSE REASSESS COMMENT FT1
patient resting comfortably in bed in no acute distress. straight cath procedure performed with 2 RNs at the bedside. sterile technique maintained. patient tolerated well. patient denies pain at this time. VSS.
patient resting comfortably in bed in no acute distress. patient denies pain at this time. VSS. RR= 22. when RN walks into room RR=40s. MD aware. waiting for bed

## 2018-02-28 NOTE — ED PROVIDER NOTE - PHYSICAL EXAMINATION
English:  General: No distress.  Mentation at baseline.   HEENT: WNL  Chest/Lungs: wheeze, increased work of breathing, tachypnea  Heart: S1S2 tachy, No M/R/G, Pules equal Bilaterally in upper and lower extremities distally  Abd: soft, NT/ND, No guarding, No rebound.  No hernias, no palpable masses.  Extrem: FROM in all joints, no significant edema noted, No ulcers.  Cap refil < 2sec.  Skin: No rash noted, warm dry.  Neuro:  Grossly normal.  No difficulty ambulating. No focal deficits.  Psychiatric: No evidence of delusions. No SI/HI.

## 2018-02-28 NOTE — H&P ADULT - NSHPPHYSICALEXAM_GEN_ALL_CORE
Vital Signs Last 24 Hrs  T(C): 36.9 (28 Feb 2018 14:40), Max: 36.9 (28 Feb 2018 14:40)  T(F): 98.5 (28 Feb 2018 14:40), Max: 98.5 (28 Feb 2018 14:40)  HR: 98 (28 Feb 2018 16:01) (98 - 101)  BP: 145/86 (28 Feb 2018 16:01) (134/79 - 145/86)  BP(mean): --  RR: 22 (28 Feb 2018 16:01) (22 - 38)  SpO2: 96% (28 Feb 2018 16:01) (90% - 96%)  CAPILLARY BLOOD GLUCOSE        I&O's Summary      PHYSICAL EXAM:  GENERAL: elderly in no acute distress  HEAD:  Atraumatic, Normocephalic  EYES: EOMI, PERRLA, conjunctiva and sclera clear  MOUTH: no oral thrush  NECK: Supple, No JVD  CHEST/LUNG: tachypneic, poor inspiratory effort, mildly decreased breath sound on left lower field; no wheezing  HEART: tachycardic; No murmurs, rubs, or gallops  ABDOMEN: Soft, Nontender, Nondistended; Bowel sounds present  EXTREMITIES:  LLE with chronic venous stasis on medial ankle;   NEUROLOGY: AAOx1, non-focal  SKIN: No rashes or lesions

## 2018-02-28 NOTE — H&P ADULT - HISTORY OF PRESENT ILLNESS
* pt with advanced dementia, poor historian - history obtained from transfer note, patient and HCP Tomas    This is a 68yr old female with advanced Alzheimer's dementia, paranoid schizophrenia, hypothyroidism, PVD, who was sent from [facility] for tachypnea and hypoxia. No documented SpO2 from transfer note. On arrival, pt noted to be breathing at 38-40s, tachycardic to 100s, mild hypoxia 90% on RA. Pt herself denies any symptoms - denies fever/chills, chest pain, sob, abdominal pain, palpitation, n/v/c/d. Of note, pt was recently hospitalized 2/16-2/22 for RSV bronchitis s/p steroid treatment.     In ED  Vitals: -145/79-96, HR , RR 30-50, Tm 98.5, SpO2 90% on RA -> 94% on 2L NC  Meds: duoneb x3, solumedrol 125

## 2018-02-28 NOTE — ED PROVIDER NOTE - ATTENDING CONTRIBUTION TO CARE
Tameka:  I have independently evaluated the patient and have documented in the appropriate sections above.  I agree with the exam and plan as noted above.

## 2018-02-28 NOTE — H&P ADULT - PROBLEM SELECTOR PLAN 4
stable  - continue risperidone and ativan Pt sent in for hypoxia, requiring NC O2. Unclear etiology at this time - 2/2 RSV bronchitis vs. PE vs. superimposed PNA  - fu CTA  - continue O2 support and wean off as tolerated

## 2018-02-28 NOTE — H&P ADULT - ATTENDING COMMENTS
Updated HCP Tomas of patient clinical course and obtained consent for CTA chest. Tomas to visit pt tomorrow    Edna Ferrera MD  Division of Hospital Medicine  Pager: [phone]  Office: [phone]

## 2018-02-28 NOTE — H&P ADULT - NSHPLABSRESULTS_GEN_ALL_CORE
LABS:   personally reviewed                        13.0   17.3  )-----------( 266      ( 28 Feb 2018 14:50 )             37.6     02-28    139  |  102  |  21  ----------------------------<  214<H>  4.1   |  21<L>  |  0.83    Ca    8.8      28 Feb 2018 14:50    TPro  6.8  /  Alb  3.6  /  TBili  0.3  /  DBili  x   /  AST  18  /  ALT  25  /  AlkPhos  83  02-28    PT/INR - ( 28 Feb 2018 14:50 )   PT: 10.5 sec;   INR: 0.97 ratio         PTT - ( 28 Feb 2018 14:50 )  PTT:26.2 sec    CARDIAC MARKERS ( 28 Feb 2018 14:50 )  x     / <0.01 ng/mL / 45 U/L / x     / 2.1 ng/mL      Serum Pro-Brain Natriuretic Peptide: 35 pg/mL (02-28 @ 14:50)      Imaging personally reviewed:  CXR: low lung volume, no consolidation    EKG personally reviewed:  sinus tachycardia @ 103bpm; no ST elevation/depression

## 2018-02-28 NOTE — ED PROVIDER NOTE - OBJECTIVE STATEMENT
The patient is a 68 F (poor historian) with h/o Alzheimer's dementia, paranoid schizophrenia, hypothyroidism, GI bleed, PVD, ASHD sent from [facility]) [facility] for tachypnea and hypoxia. patient was discharged 6 days ago with dx of RSV. The patient is a 68 F (poor historian) with h/o Alzheimer's dementia, paranoid schizophrenia, hypothyroidism, GI bleed, PVD, ASHD sent from [facility]) [facility] for tachypnea and hypoxia. patient was discharged 6 days ago with dx of RSV. patient states she has no complaints The patient is a 68 F (poor historian) with h/o Alzheimer's dementia, paranoid schizophrenia, hypothyroidism, GI bleed, PVD, ASHD sent from [facility]) [facility]isted [facility] for tachypnea and hypoxia. patient was discharged 6 days ago with dx of RSV. patient states she has no complaints    English;  difficulty breathing and hypoxia

## 2018-02-28 NOTE — H&P ADULT - PROBLEM SELECTOR PLAN 1
Pt met SIRS criteria (tachypnea, tachycardia, leukocytosis) with likely source of RSV bronchitis. Lactate 2.9. Pt admitted for sepsis. s/p IV solumedrol, duoneb  - unclear if superimposed PNA   - continue steroid taper, monitor off of abx  - fu CTA chest  - fu BCx

## 2018-02-28 NOTE — H&P ADULT - PROBLEM SELECTOR PLAN 2
Pt with tachypnea 30-50s, also with tachycardia and hypoxia. No calf tenderness. Wells score at least 3.   - CTA chest to rule out PE  - if negative, likely 2/2 anxiety.

## 2018-02-28 NOTE — H&P ADULT - ASSESSMENT
This is a 68yr old female with advanced Alzheimer's dementia, paranoid schizophrenia, hypothyroidism, PVD, who was sent from [facility] for tachypnea and hypoxia

## 2018-02-28 NOTE — H&P ADULT - PROBLEM SELECTOR PLAN 3
Pt with recent admission for RSV bronchitis (2/16-2/22) s/p steroid taper, returns with hypoxia, tachypnea. Again found to have (+) RSV  - continue steroid  - no s/s superimposed bacterial PNA at this time. Monitor off of abx  - supportive care with NC O2 and nebs

## 2018-02-28 NOTE — ED ADULT NURSE NOTE - OBJECTIVE STATEMENT
69 yo female with PMH dementia, Alzheimer's presents to the ED from Nursing home via EMS c/o SOB today. as per EMS patient was recently d/c from [facility] for RSV. patient is AAOx3. lung sounds diminished bilaterally. cap refill <3sec. patient HR= 101. RR=38-46. O2= 90% RA. patient placed on 2L NC and O2= 95%. patient has tongue protruding out of mouth, EMS states baseline as per patient. patient moving all extremities. patient has hematoma to left ankle and right abdomen. patient ambulatory and voiding. patient denies chest pain, fevers, chills, N/V/D, HA, dizziness, cough, abdominal pain, back pain. no swelling present. sinus tachycardia on the monitor.

## 2018-03-01 LAB
ANION GAP SERPL CALC-SCNC: 11 MMOL/L — SIGNIFICANT CHANGE UP (ref 5–17)
BUN SERPL-MCNC: 20 MG/DL — SIGNIFICANT CHANGE UP (ref 7–23)
CALCIUM SERPL-MCNC: 8.9 MG/DL — SIGNIFICANT CHANGE UP (ref 8.4–10.5)
CHLORIDE SERPL-SCNC: 105 MMOL/L — SIGNIFICANT CHANGE UP (ref 96–108)
CO2 SERPL-SCNC: 22 MMOL/L — SIGNIFICANT CHANGE UP (ref 22–31)
CREAT SERPL-MCNC: 0.77 MG/DL — SIGNIFICANT CHANGE UP (ref 0.5–1.3)
GLUCOSE SERPL-MCNC: 160 MG/DL — HIGH (ref 70–99)
HCT VFR BLD CALC: 34.1 % — LOW (ref 34.5–45)
HGB BLD-MCNC: 11.4 G/DL — LOW (ref 11.5–15.5)
MCHC RBC-ENTMCNC: 29.9 PG — SIGNIFICANT CHANGE UP (ref 27–34)
MCHC RBC-ENTMCNC: 33.4 GM/DL — SIGNIFICANT CHANGE UP (ref 32–36)
MCV RBC AUTO: 89.5 FL — SIGNIFICANT CHANGE UP (ref 80–100)
PLATELET # BLD AUTO: 263 K/UL — SIGNIFICANT CHANGE UP (ref 150–400)
POTASSIUM SERPL-MCNC: 4.2 MMOL/L — SIGNIFICANT CHANGE UP (ref 3.5–5.3)
POTASSIUM SERPL-SCNC: 4.2 MMOL/L — SIGNIFICANT CHANGE UP (ref 3.5–5.3)
RBC # BLD: 3.81 M/UL — SIGNIFICANT CHANGE UP (ref 3.8–5.2)
RBC # FLD: 15.9 % — HIGH (ref 10.3–14.5)
SODIUM SERPL-SCNC: 138 MMOL/L — SIGNIFICANT CHANGE UP (ref 135–145)
WBC # BLD: 16.47 K/UL — HIGH (ref 3.8–10.5)
WBC # FLD AUTO: 16.47 K/UL — HIGH (ref 3.8–10.5)

## 2018-03-01 RX ADMIN — Medication 1 MILLIGRAM(S): at 21:13

## 2018-03-01 RX ADMIN — Medication 3 MILLILITER(S): at 23:07

## 2018-03-01 RX ADMIN — HEPARIN SODIUM 5000 UNIT(S): 5000 INJECTION INTRAVENOUS; SUBCUTANEOUS at 05:11

## 2018-03-01 RX ADMIN — HEPARIN SODIUM 5000 UNIT(S): 5000 INJECTION INTRAVENOUS; SUBCUTANEOUS at 13:06

## 2018-03-01 RX ADMIN — HEPARIN SODIUM 5000 UNIT(S): 5000 INJECTION INTRAVENOUS; SUBCUTANEOUS at 21:13

## 2018-03-01 RX ADMIN — FAMOTIDINE 20 MILLIGRAM(S): 10 INJECTION INTRAVENOUS at 13:06

## 2018-03-01 RX ADMIN — Medication 40 MILLIGRAM(S): at 05:11

## 2018-03-01 RX ADMIN — CETIRIZINE HYDROCHLORIDE 5 MILLIGRAM(S): 10 TABLET ORAL at 21:13

## 2018-03-01 RX ADMIN — RISPERIDONE 1 MILLIGRAM(S): 4 TABLET ORAL at 05:11

## 2018-03-01 RX ADMIN — Medication 50 MICROGRAM(S): at 05:11

## 2018-03-01 RX ADMIN — Medication 3 MILLILITER(S): at 11:19

## 2018-03-01 RX ADMIN — Medication 3 MILLILITER(S): at 05:10

## 2018-03-01 RX ADMIN — RISPERIDONE 1 MILLIGRAM(S): 4 TABLET ORAL at 18:22

## 2018-03-01 RX ADMIN — Medication 3 MILLILITER(S): at 18:23

## 2018-03-01 NOTE — CONSULT NOTE ADULT - ATTENDING COMMENTS
Agree with above NP note.  cv stable  hypoxia sec to pulmonary infection  no acs, no decomp hf  await echo   med/pulmo f/u

## 2018-03-01 NOTE — CONSULT NOTE ADULT - SUBJECTIVE AND OBJECTIVE BOX
PULMONARY CONSULT  Peng Murray MD  [phone]    Initial HPI on admission:  HPI:  * pt with advanced dementia, poor historian - history obtained from transfer note, patient and HCP Tomas    This is a 68yr old female with advanced Alzheimer's dementia, paranoid schizophrenia, hypothyroidism, PVD, who was sent from [facility] for tachypnea and hypoxia. No documented SpO2 from transfer note. On arrival, pt noted to be breathing at 38-40s, tachycardic to 100s, mild hypoxia 90% on RA. Pt herself denies any symptoms - denies fever/chills, chest pain, sob, abdominal pain, palpitation, n/v/c/d. Of note, pt was recently hospitalized - for RSV bronchitis s/p steroid treatment.     CTA Personally Reviewed: No PE, No consolidation/pna; bibasilar dependent atelectasis  Currently awake, alert, without SOB, cough, or wheeze    In ED  Vitals: -145/79-96, HR , RR 30-50, Tm 98.5, SpO2 90% on RA -> 94% on 2L NC  Meds: duoneb x3, solumedrol 125 (2018 16:59)      BRIEF HOSPITAL COURSE: ***    PAST MEDICAL & SURGICAL HISTORY:  Hypothyroid  Upper GI bleed  Paranoid schizophrenia  UTI (urinary tract infection)  Alzheimer's dementia  No significant past surgical history    Allergies    No Known Allergies    Intolerances      FAMILY HISTORY:  No pertinent family history in first degree relatives    Review of Systems: unable to obtain 2/2 pt dementia	      Allergies and Intolerances:        Allergies:  	No Known Allergies:     Home Medications:   * Patient Currently Takes Medications as of 2018 17:09 documented in Structured Notes  · 	Advair Diskus 100 mcg-50 mcg inhalation powder: 1 puff(s) inhaled 2 times a day , Last Dose Taken:    · 	LORazepam 1 mg oral tablet: 1 tab(s) orally once a day (at bedtime) MDD:1 tab daily, Last Dose Taken:    · 	predniSONE 10 mg oral tablet: 2 tab(s) orally 2 times a day x2 days  	3 tabs orally daily x2 days  	1 tab orally daily x 2 days, Last Dose Taken:    · 	risperiDONE 1 mg/mL oral solution: 1 milliliter(s) orally 2 times a day, Last Dose Taken:    · 	raNITIdine 150 mg oral capsule: 1 cap(s) orally once a day, Last Dose Taken:    · 	levothyroxine 50 mcg (0.05 mg) oral tablet: 1 tab(s) orally once a day, Last Dose Taken:        Medications:  MEDICATIONS  (STANDING):  ALBUTerol/ipratropium for Nebulization 3 milliLiter(s) Nebulizer every 6 hours  cetirizine 5 milliGRAM(s) Oral daily  famotidine   Suspension 20 milliGRAM(s) Oral daily  heparin  Injectable 5000 Unit(s) SubCutaneous every 8 hours  levothyroxine 50 MICROGram(s) Oral daily  LORazepam     Tablet 1 milliGRAM(s) Oral at bedtime  predniSONE   Tablet 40 milliGRAM(s) Oral daily  risperiDONE   Solution 1 milliGRAM(s) Oral two times a day    MEDICATIONS  (PRN):    Vital Signs Last 24 Hrs  T(C): 36.9 (01 Mar 2018 11:00), Max: 36.9 (2018 14:40)  T(F): 98.5 (01 Mar 2018 11:00), Max: 98.5 (2018 14:40)  HR: 80 (01 Mar 2018 11:00) (79 - 101)  BP: 108/69 (01 Mar 2018 11:00) (108/69 - 165/93)  BP(mean): --  RR: 17 (01 Mar 2018 11:00) (17 - 38)  SpO2: 96% (01 Mar 2018 11:00) (90% - 98%)           @ 07:01  -  03-01 @ 07:00  --------------------------------------------------------  IN: 240 mL / OUT: 0 mL / NET: 240 mL      LABS:                        11.4   16.47 )-----------( 263      ( 01 Mar 2018 07:32 )             34.1         138  |  105  |  20  ----------------------------<  160<H>  4.2   |  22  |  0.77    Ca    8.9      01 Mar 2018 05:28    TPro  6.8  /  Alb  3.6  /  TBili  0.3  /  DBili  x   /  AST  18  /  ALT  25  /  AlkPhos  83        PT/INR - ( 2018 14:50 )   PT: 10.5 sec;   INR: 0.97 ratio         PTT - ( 2018 14:50 )  PTT:26.2 sec  Urinalysis Basic - ( 2018 17:20 )    Color: PL Yellow / Appearance: Clear / S.016 / pH: x  Gluc: x / Ketone: Negative  / Bili: Negative / Urobili: Negative   Blood: x / Protein: Negative / Nitrite: Negative   Leuk Esterase: Moderate / RBC: 3-5 /HPF / WBC 26-50 /HPF   Sq Epi: x / Non Sq Epi: x / Bacteria: x        Serum Pro-Brain Natriuretic Peptide: 35 pg/mL (18 @ 14:50)      CULTURES:        Physical Examination:    General: Non toxic, No acute distress.      HEENT: Pupils equal, reactive to light.  Symmetric.    PULM: Clear to auscultation bilaterally, no significant sputum production    CVS: Regular rate and rhythm, no murmurs, rubs, or gallops    ABD: Soft, nondistended, nontender, normoactive bowel sounds, no masses    EXT: Trace to 1+ LE edema    SKIN: Warm and well perfused, no rashes noted.    NEURO: Alert, oriented, interactive, nonfocal    RADIOLOGY REVIEWED PERSONALLY  CXR:    CT chest:    TTE:      Assessment:    Plan:

## 2018-03-01 NOTE — PROGRESS NOTE ADULT - ASSESSMENT
· Assessment	  This is a 68yr old female with advanced Alzheimer's dementia, paranoid schizophrenia, hypothyroidism, PVD, who was sent from [facility] for tachypnea and hypoxia     Problem/Plan - 1:  ·  Problem: Sepsis, due to unspecified organism.  Plan: Pt met SIRS criteria (tachypnea, tachycardia, leukocytosis) with likely source of RSV bronchitis. Lactate 2.9. Pt admitted for sepsis. s/p IV solumedrol, duoneb  - unclear if superimposed PNA   - continue steroid taper, monitor off of abx  - CTA chest: No PE.  -     Problem/Plan - 2:  ·  Problem: Tachypnea.  Plan: Pt with tachypnea 30-50s, also with tachycardia and hypoxia. No calf tenderness. Wells score at least 3.     - if negative, likely 2/2 anxiety.      Problem/Plan - 3:  ·  Problem: RSV bronchitis.  Plan: Pt with recent admission for RSV bronchitis (2/16-2/22) s/p steroid taper, returns with hypoxia, tachypnea. Again found to have (+) RSV  - continue steroid  - no s/s superimposed bacterial PNA at this time. Monitor off of abx  - supportive care with NC O2 and nebs.      Problem/Plan - 4:  ·  Problem: Acute respiratory failure with hypoxia. Plan: Pt sent in for hypoxia, requiring NC O2. Unclear etiology at this time - 2/2 RSV bronchitis vs. PE vs. superimposed PNA  - Pul eval noted.  - continue O2 support and wean off as tolerated.     Problem/Plan - 5:  ·  Problem: Paranoid schizophrenia. Plan: stable  - continue risperidone and ativan.     Problem/Plan - 6:  Problem: Hypothyroidism, unspecified type.Plan: continue synthroid.     Problem/Plan - 7:  ·  Problem: Alzheimer's dementia. Plan: at baseline.   - frequent orientation.

## 2018-03-01 NOTE — CONSULT NOTE ADULT - SUBJECTIVE AND OBJECTIVE BOX
CARDIOLOGY CONSULT - Dr. Denny     CHIEF COMPLAINT:    HPI:  * pt with advanced dementia, poor historian - history obtained from transfer note, patient and HCP Tomas    This is a 68yr old female with advanced Alzheimer's dementia, paranoid schizophrenia, hypothyroidism, PVD, who was sent from [facility] for tachypnea and hypoxia. No documented SpO2 from transfer note. On arrival, pt noted to be breathing at 38-40s, tachycardic to 100s, mild hypoxia 90% on RA. Pt herself denies any symptoms - denies fever/chills, chest pain, sob, abdominal pain, palpitation, n/v/c/d. Of note, pt was recently hospitalized 2/16-2/22 for RSV bronchitis s/p steroid treatment.     I      PAST MEDICAL & SURGICAL HISTORY:  Hypothyroid  Upper GI bleed  Paranoid schizophrenia  UTI (urinary tract infection)  Alzheimer's dementia  No significant past surgical history          PREVIOUS DIAGNOSTIC TESTING:    [ ] Echocardiogram:  < from: Transthoracic Echocardiogram w/Doppler (05.15.13 @ 00:00) >  Ejection Fraction: 65 %  < from: Transthoracic Echocardiogram w/Doppler (05.15.13 @ 00:00) >  ------------------------------------------------------------------------  Conclusions:  1. Mitral annular calcification, otherwise normal mitral  valve. Minimal mitral regurgitation.  2. Normal left ventricular internal dimensions and wall  thicknesses.  3. Normal left ventricular systolic function. No segmental  wall motion abnormalities.  4. Normal right ventricular size and function.  5. Estimated right ventricular systolic pressure equals 33  mm Hg, assuming right atrial pressure equals 10 mm Hg,  consistent with normal pulmonary pressures.  < end of copied text >    [ ]  Catheterization:    [ ] Stress Test:  	    MEDICATIONS:  MEDICATIONS  (STANDING):  ALBUTerol/ipratropium for Nebulization 3 milliLiter(s) Nebulizer every 6 hours  cetirizine 5 milliGRAM(s) Oral daily  famotidine   Suspension 20 milliGRAM(s) Oral daily  heparin  Injectable 5000 Unit(s) SubCutaneous every 8 hours  levothyroxine 50 MICROGram(s) Oral daily  LORazepam     Tablet 1 milliGRAM(s) Oral at bedtime  predniSONE   Tablet 40 milliGRAM(s) Oral daily  risperiDONE   Solution 1 milliGRAM(s) Oral two times a day      FAMILY HISTORY:  No pertinent family history in first degree relatives      SOCIAL HISTORY:    [ ] Non-smoker  [ ] Smoker  [ ] Alcohol    Allergies    No Known Allergies    Intolerances    	    REVIEW OF SYSTEMS:  CONSTITUTIONAL: No fever, weight loss, or fatigue  EYES: No eye pain, visual disturbances, or discharge  ENMT:  No difficulty hearing, tinnitus, vertigo; No sinus or throat pain  NECK: No pain or stiffness  RESPIRATORY: No cough, wheezing, chills or hemoptysis; No Shortness of Breath  CARDIOVASCULAR: No chest pain, palpitations, passing out, dizziness, or leg swelling  GASTROINTESTINAL: No abdominal or epigastric pain. No nausea, vomiting, or hematemesis; No diarrhea or constipation. No melena or hematochezia.  GENITOURINARY: No dysuria, frequency, hematuria, or incontinence  NEUROLOGICAL: No headaches, memory loss, loss of strength, numbness, or tremors  SKIN: No itching, burning, rashes, or lesions   	    [ ] All others negative	  [ ] Unable to obtain    PHYSICAL EXAM:  T(C): 36.9 (03-01-18 @ 11:00), Max: 36.9 (02-28-18 @ 14:40)  HR: 80 (03-01-18 @ 11:00) (79 - 101)  BP: 108/69 (03-01-18 @ 11:00) (108/69 - 165/93)  RR: 17 (03-01-18 @ 11:00) (17 - 38)  SpO2: 96% (03-01-18 @ 11:00) (90% - 98%)  Wt(kg): --  I&O's Summary    28 Feb 2018 07:01  -  01 Mar 2018 07:00  --------------------------------------------------------  IN: 240 mL / OUT: 0 mL / NET: 240 mL    01 Mar 2018 07:01  -  01 Mar 2018 14:32  --------------------------------------------------------  IN: 900 mL / OUT: 0 mL / NET: 900 mL        Appearance: Normal	  Psychiatry: A & O x 3, Mood & affect appropriate  HEENT:   Normal oral mucosa, PERRL, EOMI	  Lymphatic: No lymphadenopathy  Cardiovascular: Normal S1 S2,RRR, No JVD, No murmurs  Respiratory: Lungs clear to auscultation	  Gastrointestinal:  Soft, Non-tender, + BS	  Skin: No rashes, No ecchymoses, No cyanosis	  Neurologic: Non-focal  Extremities: Normal range of motion, No clubbing, cyanosis or edema  Vascular: Peripheral pulses palpable 2+ bilaterally    TELEMETRY: 	    ECG:  sinus tachycardia    old inferior infarct 	  RADIOLOGY:  < from: CT Angio Chest w/ IV Cont (02.28.18 @ 23:49) >    CHEST:     LUNGS AND LARGE AIRWAYS: Patent central airways.  No pulmonary nodules.   Bibasilar dependent atelectasis.  PLEURA: No pleural effusion.  VESSELS: No pulmonary embolism.  HEART: Heart size is normal. No pericardial effusion.  MEDIASTINUM AND NAREN: No lymphadenopathy.  CHEST WALL AND LOWER NECK: Within normal limits.  VISUALIZED UPPER ABDOMEN: ] hiatal hernia.  BONES: Multilevel degenerative changes are unchanged from our CT of   5/20/2013.    IMPRESSION:   No pulmonary embolism.  Bibasilar dependent atelectasis.  Moderate hiatal hernia.      < end of copied text >    OTHER: 	  < from: Xray Chest 1 View AP/PA (02.28.18 @ 15:20) >    IMPRESSION:   Low lung volumes. No focal consolidation.    < end of copied text >  	  LABS:	 	    CARDIAC MARKERS:                                  11.4   16.47 )-----------( 263      ( 01 Mar 2018 07:32 )             34.1     03-01    138  |  105  |  20  ----------------------------<  160<H>  4.2   |  22  |  0.77    Ca    8.9      01 Mar 2018 05:28    TPro  6.8  /  Alb  3.6  /  TBili  0.3  /  DBili  x   /  AST  18  /  ALT  25  /  AlkPhos  83  02-28    PT/INR - ( 28 Feb 2018 14:50 )   PT: 10.5 sec;   INR: 0.97 ratio         PTT - ( 28 Feb 2018 14:50 )  PTT:26.2 sec  proBNP: Serum Pro-Brain Natriuretic Peptide: 35 pg/mL (02-28 @ 14:50)    Lipid Profile:   HgA1c:   TSH: CARDIOLOGY CONSULT - Dr. Denny     CHIEF COMPLAINT: difficulty breathing     HPI:  * pt with advanced dementia, poor historian - history obtained from h&P   This is a 68yr old female with advanced Alzheimer's dementia, paranoid schizophrenia, hypothyroidism, PVD, who was sent from [facility] for tachypnea and hypoxia. No documented SpO2 from transfer note.  Per h/p , pt noted to be breathing at 38-40s, tachycardic to 100s, mild hypoxia 90% on RA in the ED. Now stable. + RSV. Denies any history of MI, CHF or valvular disease.   Pt denies any symptoms - denies fever/chills, chest pain, sob, abdominal pain, palpitation, n/v/c/d.  ROS otherwise negative  Of note, pt was recently hospitalized 2/16-2/22 for RSV bronchitis s/p steroid treatment.         PAST MEDICAL & SURGICAL HISTORY:  Hypothyroid  Upper GI bleed  Paranoid schizophrenia  UTI (urinary tract infection)  Alzheimer's dementia  No significant past surgical history          PREVIOUS DIAGNOSTIC TESTING:    [ x] Echocardiogram:  < from: Transthoracic Echocardiogram w/Doppler (05.15.13 @ 00:00) >  Ejection Fraction: 65 %  < from: Transthoracic Echocardiogram w/Doppler (05.15.13 @ 00:00) >  ------------------------------------------------------------------------  Conclusions:  1. Mitral annular calcification, otherwise normal mitral  valve. Minimal mitral regurgitation.  2. Normal left ventricular internal dimensions and wall  thicknesses.  3. Normal left ventricular systolic function. No segmental  wall motion abnormalities.  4. Normal right ventricular size and function.  5. Estimated right ventricular systolic pressure equals 33  mm Hg, assuming right atrial pressure equals 10 mm Hg,  consistent with normal pulmonary pressures.  < end of copied text >    [ ]  Catheterization:    [ ] Stress Test:  	    MEDICATIONS:  MEDICATIONS  (STANDING):  ALBUTerol/ipratropium for Nebulization 3 milliLiter(s) Nebulizer every 6 hours  cetirizine 5 milliGRAM(s) Oral daily  famotidine   Suspension 20 milliGRAM(s) Oral daily  heparin  Injectable 5000 Unit(s) SubCutaneous every 8 hours  levothyroxine 50 MICROGram(s) Oral daily  LORazepam     Tablet 1 milliGRAM(s) Oral at bedtime  predniSONE   Tablet 40 milliGRAM(s) Oral daily  risperiDONE   Solution 1 milliGRAM(s) Oral two times a day      FAMILY HISTORY:  No pertinent family history in first degree relatives      SOCIAL HISTORY:    [x ] Non-smoker  [ ] Smoker  [ ] Alcohol    Allergies    No Known Allergies    Intolerances    	    REVIEW OF SYSTEMS:  CONSTITUTIONAL: No fever, weight loss, or fatigue  EYES: No eye pain, visual disturbances, or discharge  ENMT:  No difficulty hearing, tinnitus, vertigo; No sinus or throat pain  NECK: No pain or stiffness  RESPIRATORY: No cough, wheezing, chills or hemoptysis; No Shortness of Breath  CARDIOVASCULAR: No chest pain, palpitations, passing out, dizziness, or leg swelling  GASTROINTESTINAL: No abdominal or epigastric pain. No nausea, vomiting, or hematemesis; No diarrhea or constipation. No melena or hematochezia.  GENITOURINARY: No dysuria, frequency, hematuria, or incontinence  NEUROLOGICAL: No headaches, memory loss, loss of strength, numbness, or tremors  SKIN: No itching, burning, rashes, or lesions   	    [ x] All others negative	  [ ] Unable to obtain    PHYSICAL EXAM:  T(C): 36.9 (03-01-18 @ 11:00), Max: 36.9 (02-28-18 @ 14:40)  HR: 80 (03-01-18 @ 11:00) (79 - 101)  BP: 108/69 (03-01-18 @ 11:00) (108/69 - 165/93)  RR: 17 (03-01-18 @ 11:00) (17 - 38)  SpO2: 96% (03-01-18 @ 11:00) (90% - 98%)  Wt(kg): --  I&O's Summary    28 Feb 2018 07:01  -  01 Mar 2018 07:00  --------------------------------------------------------  IN: 240 mL / OUT: 0 mL / NET: 240 mL    01 Mar 2018 07:01  -  01 Mar 2018 14:32  --------------------------------------------------------  IN: 900 mL / OUT: 0 mL / NET: 900 mL        Appearance: Normal	  Psychiatry: A & O x 1 Mood & affect appropriate  HEENT:   Normal oral mucosa, PERRL, EOMI	  Lymphatic: No lymphadenopathy  Cardiovascular: Normal S1 S2,RRR, No JVD, No murmurs  Respiratory: Lungs clear to auscultation	  Gastrointestinal:  Soft, Non-tender, + BS	  Skin: No rashes, No ecchymoses, No cyanosis	  Neurologic: Non-focal  Extremities: Normal range of motion, + 1 trace  edema  Vascular: Peripheral pulses palpable 2+ bilaterally    TELEMETRY: NSR 	    ECG:  sinus tachycardia    old inferior infarct 	  RADIOLOGY:  < from: CT Angio Chest w/ IV Cont (02.28.18 @ 23:49) >    CHEST:     LUNGS AND LARGE AIRWAYS: Patent central airways.  No pulmonary nodules.   Bibasilar dependent atelectasis.  PLEURA: No pleural effusion.  VESSELS: No pulmonary embolism.  HEART: Heart size is normal. No pericardial effusion.  MEDIASTINUM AND NAREN: No lymphadenopathy.  CHEST WALL AND LOWER NECK: Within normal limits.  VISUALIZED UPPER ABDOMEN: ] hiatal hernia.  BONES: Multilevel degenerative changes are unchanged from our CT of   5/20/2013.    IMPRESSION:   No pulmonary embolism.  Bibasilar dependent atelectasis.  Moderate hiatal hernia.      < end of copied text >    OTHER: 	  < from: Xray Chest 1 View AP/PA (02.28.18 @ 15:20) >    IMPRESSION:   Low lung volumes. No focal consolidation.    < end of copied text >  	  LABS:	 	    CARDIAC MARKERS:                                  11.4   16.47 )-----------( 263      ( 01 Mar 2018 07:32 )             34.1     03-01    138  |  105  |  20  ----------------------------<  160<H>  4.2   |  22  |  0.77    Ca    8.9      01 Mar 2018 05:28    TPro  6.8  /  Alb  3.6  /  TBili  0.3  /  DBili  x   /  AST  18  /  ALT  25  /  AlkPhos  83  02-28    PT/INR - ( 28 Feb 2018 14:50 )   PT: 10.5 sec;   INR: 0.97 ratio         PTT - ( 28 Feb 2018 14:50 )  PTT:26.2 sec  proBNP: Serum Pro-Brain Natriuretic Peptide: 35 pg/mL (02-28 @ 14:50)    Lipid Profile:   HgA1c:   TSH:

## 2018-03-02 LAB
-  AMIKACIN: SIGNIFICANT CHANGE UP
-  AMPICILLIN/SULBACTAM: SIGNIFICANT CHANGE UP
-  AMPICILLIN: SIGNIFICANT CHANGE UP
-  AZTREONAM: SIGNIFICANT CHANGE UP
-  CEFAZOLIN: SIGNIFICANT CHANGE UP
-  CEFEPIME: SIGNIFICANT CHANGE UP
-  CEFOXITIN: SIGNIFICANT CHANGE UP
-  CEFTAZIDIME: SIGNIFICANT CHANGE UP
-  CEFTRIAXONE: SIGNIFICANT CHANGE UP
-  CIPROFLOXACIN: SIGNIFICANT CHANGE UP
-  ERTAPENEM: SIGNIFICANT CHANGE UP
-  GENTAMICIN: SIGNIFICANT CHANGE UP
-  LEVOFLOXACIN: SIGNIFICANT CHANGE UP
-  MEROPENEM: SIGNIFICANT CHANGE UP
-  NITROFURANTOIN: SIGNIFICANT CHANGE UP
-  PIPERACILLIN/TAZOBACTAM: SIGNIFICANT CHANGE UP
-  TOBRAMYCIN: SIGNIFICANT CHANGE UP
-  TRIMETHOPRIM/SULFAMETHOXAZOLE: SIGNIFICANT CHANGE UP
ANION GAP SERPL CALC-SCNC: 11 MMOL/L — SIGNIFICANT CHANGE UP (ref 5–17)
BUN SERPL-MCNC: 25 MG/DL — HIGH (ref 7–23)
CALCIUM SERPL-MCNC: 8.7 MG/DL — SIGNIFICANT CHANGE UP (ref 8.4–10.5)
CHLORIDE SERPL-SCNC: 106 MMOL/L — SIGNIFICANT CHANGE UP (ref 96–108)
CO2 SERPL-SCNC: 24 MMOL/L — SIGNIFICANT CHANGE UP (ref 22–31)
CREAT SERPL-MCNC: 0.88 MG/DL — SIGNIFICANT CHANGE UP (ref 0.5–1.3)
CULTURE RESULTS: SIGNIFICANT CHANGE UP
GLUCOSE BLDC GLUCOMTR-MCNC: 113 MG/DL — HIGH (ref 70–99)
GLUCOSE SERPL-MCNC: 97 MG/DL — SIGNIFICANT CHANGE UP (ref 70–99)
HCT VFR BLD CALC: 33.1 % — LOW (ref 34.5–45)
HGB BLD-MCNC: 10.9 G/DL — LOW (ref 11.5–15.5)
MCHC RBC-ENTMCNC: 29.9 PG — SIGNIFICANT CHANGE UP (ref 27–34)
MCHC RBC-ENTMCNC: 32.9 GM/DL — SIGNIFICANT CHANGE UP (ref 32–36)
MCV RBC AUTO: 90.9 FL — SIGNIFICANT CHANGE UP (ref 80–100)
METHOD TYPE: SIGNIFICANT CHANGE UP
ORGANISM # SPEC MICROSCOPIC CNT: SIGNIFICANT CHANGE UP
ORGANISM # SPEC MICROSCOPIC CNT: SIGNIFICANT CHANGE UP
PLATELET # BLD AUTO: 276 K/UL — SIGNIFICANT CHANGE UP (ref 150–400)
POTASSIUM SERPL-MCNC: 3.8 MMOL/L — SIGNIFICANT CHANGE UP (ref 3.5–5.3)
POTASSIUM SERPL-SCNC: 3.8 MMOL/L — SIGNIFICANT CHANGE UP (ref 3.5–5.3)
RBC # BLD: 3.64 M/UL — LOW (ref 3.8–5.2)
RBC # FLD: 16.2 % — HIGH (ref 10.3–14.5)
SODIUM SERPL-SCNC: 141 MMOL/L — SIGNIFICANT CHANGE UP (ref 135–145)
SPECIMEN SOURCE: SIGNIFICANT CHANGE UP
WBC # BLD: 13.17 K/UL — HIGH (ref 3.8–10.5)
WBC # FLD AUTO: 13.17 K/UL — HIGH (ref 3.8–10.5)

## 2018-03-02 PROCEDURE — 93306 TTE W/DOPPLER COMPLETE: CPT | Mod: 26

## 2018-03-02 RX ORDER — ALPRAZOLAM 0.25 MG
0.5 TABLET ORAL ONCE
Qty: 0 | Refills: 0 | Status: DISCONTINUED | OUTPATIENT
Start: 2018-03-02 | End: 2018-03-02

## 2018-03-02 RX ADMIN — Medication 0.5 MILLIGRAM(S): at 13:11

## 2018-03-02 RX ADMIN — Medication 1 MILLIGRAM(S): at 21:48

## 2018-03-02 RX ADMIN — CETIRIZINE HYDROCHLORIDE 5 MILLIGRAM(S): 10 TABLET ORAL at 11:54

## 2018-03-02 RX ADMIN — HEPARIN SODIUM 5000 UNIT(S): 5000 INJECTION INTRAVENOUS; SUBCUTANEOUS at 13:12

## 2018-03-02 RX ADMIN — RISPERIDONE 1 MILLIGRAM(S): 4 TABLET ORAL at 17:07

## 2018-03-02 RX ADMIN — Medication 3 MILLILITER(S): at 23:10

## 2018-03-02 RX ADMIN — Medication 40 MILLIGRAM(S): at 05:33

## 2018-03-02 RX ADMIN — Medication 3 MILLILITER(S): at 05:33

## 2018-03-02 RX ADMIN — Medication 50 MICROGRAM(S): at 05:33

## 2018-03-02 RX ADMIN — HEPARIN SODIUM 5000 UNIT(S): 5000 INJECTION INTRAVENOUS; SUBCUTANEOUS at 05:33

## 2018-03-02 RX ADMIN — Medication 3 MILLILITER(S): at 11:55

## 2018-03-02 RX ADMIN — RISPERIDONE 1 MILLIGRAM(S): 4 TABLET ORAL at 05:33

## 2018-03-02 RX ADMIN — HEPARIN SODIUM 5000 UNIT(S): 5000 INJECTION INTRAVENOUS; SUBCUTANEOUS at 21:48

## 2018-03-02 RX ADMIN — FAMOTIDINE 20 MILLIGRAM(S): 10 INJECTION INTRAVENOUS at 11:55

## 2018-03-02 RX ADMIN — Medication 3 MILLILITER(S): at 17:07

## 2018-03-02 NOTE — PROGRESS NOTE ADULT - ASSESSMENT
Schizophrenia with anxiety  No clinical suspicion of pulmonary infection  Leukocytosis with Proteus Bacteuria  Dementia    REC    Contunue nebulizers  No need for abx for lung

## 2018-03-02 NOTE — PROGRESS NOTE ADULT - ASSESSMENT
· Assessment	  This is a 68yr old female with advanced Alzheimer's dementia, paranoid schizophrenia, hypothyroidism, PVD, who was sent from [facility] for tachypnea and hypoxia     Problem/Plan - 1:  ·  Problem: Sepsis, due to unspecified organism.  Plan: Resolved.  -     Problem/Plan - 2:  ·  Problem: Tachypnea.  Plan: Pt with tachypnea 30-50s, also with tachycardia and hypoxia. No calf tenderness. Wells score at least 3.     - if negative, likely 2/2 anxiety.      Problem/Plan - 3:  ·  Problem: RSV bronchitis.  Plan: Duoneb. Pul f/up noted.     Problem/Plan - 4:  ·  Problem: Acute respiratory failure with hypoxia. Plan: Pt sent in for hypoxia, requiring NC O2. Unclear etiology at this time - 2/2 RSV bronchitis vs. PE vs. superimposed PNA  - Pul f/up noted.  - continue O2 support and wean off as tolerated.     Problem/Plan - 5:  ·  Problem: Paranoid schizophrenia. Plan: stable  - continue risperidone and ativan.     Problem/Plan - 6:  Problem: Hypothyroidism, unspecified type. Plan: continue synthroid.     Problem/Plan - 7:  ·  Problem: Alzheimer's dementia. Plan: at baseline.   - frequent orientation.

## 2018-03-02 NOTE — PHYSICAL THERAPY INITIAL EVALUATION ADULT - GENERAL OBSERVATIONS, REHAB EVAL
Rec'd in Rec'd semisupine in bed with NCO2, +tele, +continuos pulse oximetry. Pt is calm, forgetful, agreeable to PT

## 2018-03-02 NOTE — PHYSICAL THERAPY INITIAL EVALUATION ADULT - LEVEL OF INDEPENDENCE: GAIT, REHAB EVAL
percent of your current weight) by decreasing your calorie intake and becoming more physically active will help lower your risk of developing or worsening diseases associated with obesity. Learn more at: [email or web address]             Medications and Orders      Allergies              Codeine     Toradol [Ketorolac Tromethamine]     Ibuprofen Nausea And Vomiting    Bleeding gastric ulcers      We Ordered/Performed the following           [facility] Physical Therapy - RIGOBERTO SERRANO [facility]     Scheduling Instructions:    [facility]  [address], [place] [address]  [phone]  Please call within 48 hours to schedule your appointment. Comments: The patient can be scheduled with any member of the group, including the provider with the first available appointments. Additional Information        Basic Information     Date Of Birth Sex Race Ethnicity Preferred Language    1991 Female White Non-/Non  English      Problem List as of 9/27/2017  Date Reviewed: 9/27/2017          None      Preventive Care        Date Due    Tetanus Combination Vaccine (1 - Tdap) 8/10/2008    Pneumococcal Vaccine - Pneumovax for adults aged 19-64 years with: chronic heart disease, chronic lung disease, diabetes mellitus, alcoholism, chronic liver disease, or cigarette smoking. (1 of 1 - PPSV23) 8/10/2008    Pap Smear 8/10/2010    Yearly Flu Vaccine (1) 9/1/2017    HIV screening is recommended for all people regardless of risk factors  aged 15-65 years at least once (lifetime) who have never been HIV tested. 2/16/2018 (Originally 8/10/2004)            [address] [place] [address] records indicate that you have declined MyChart signup. supervision

## 2018-03-02 NOTE — PHYSICAL THERAPY INITIAL EVALUATION ADULT - PERTINENT HX OF CURRENT PROBLEM, REHAB EVAL
68yr old female with advanced Alzheimer's dementia, paranoid schizophrenia, hypothyroidism, PVD, who was sent from [facility] for tachypnea and hypoxia 68yr old female admitted to [facility] on 2/28/18  with advanced Alzheimer's dementia, paranoid schizophrenia, hypothyroidism, PVD, who was sent from [facility] for tachypnea and hypoxia

## 2018-03-02 NOTE — PROGRESS NOTE ADULT - ASSESSMENT
68yr old female with advanced Alzheimer's dementia, paranoid schizophrenia, hypothyroidism, PVD, who was sent from [facility] for tachypnea and hypoxia    1. Hypoxia   likely from viral illness   + RSV   chest xray revealing no focal consolidation   CTA chest neg for PE   no evidence of decomp CHF on exam   echo revealing normal LV sys fx , mod diastolic dysfx   events from earlier noted, likely anxiety driven   BC neg  pulm f/u     2. cv stable   no chest pain or sob.  EKG no evidence of acute ischemia/ACS  no evidence of decomp CHF on exam   echo revealing normal LV sys fx , mod diastolic dysfx     dvt ppx

## 2018-03-02 NOTE — PHYSICAL THERAPY INITIAL EVALUATION ADULT - ADDITIONAL COMMENTS
lives in Assisted Living Pt with PMH of dementia, PLOF and social history obtained from pt. Pt lives in assisted living alone. Pt states she was able to independently perform all ADLs and ambulated without an AD.

## 2018-03-02 NOTE — CHART NOTE - NSCHARTNOTEFT_GEN_A_CORE
Patient is a 68y old  Female who presents with a chief complaint of Tachypnea/hypoxia (28 Feb 2018 16:59)      Called by RN to report patient is breathing heavily. Seen and examined at the bedside. Patient is hyperventilating and appears anxious . She denies chest pain, N/V, headache, abdominal pain and fever. Xanax 0.5 mg ordered. Now patient is calm and  resting in bed. Will continue to monitor. D/w attending.    Vital Signs Last 24 Hrs  T(C): 36.4 (02 Mar 2018 11:30), Max: 36.7 (01 Mar 2018 20:42)  T(F): 97.6 (02 Mar 2018 11:30), Max: 98 (01 Mar 2018 20:42)  HR: 78 (02 Mar 2018 12:34) (68 - 80)  BP: 152/85 (02 Mar 2018 12:34) (101/62 - 152/85)  BP(mean): --  RR: 18 (02 Mar 2018 12:34) (17 - 18)  SpO2: 94% (02 Mar 2018 12:34) (93% - 98%)                          10.9   13.17 )-----------( 276      ( 02 Mar 2018 07:18 )             33.1     03-02    141  |  106  |  25<H>  ----------------------------<  97  3.8   |  24  |  0.88    Ca    8.7      02 Mar 2018 05:13          PHYSICAL EXAM:  GENERAL: NAD, well-developed  HEAD:  Atraumatic, Normocephalic  EYES: EOMI, PERRLA, conjunctiva and sclera clear  NECK: Supple, No JVD  CHEST/LUNG: Clear to auscultation bilaterally; No wheeze  HEART: Regular rate and rhythm; No murmurs, rubs, or gallops  ABDOMEN: Soft, Nontender, Nondistended; Bowel sounds present  EXTREMITIES:  2+ Peripheral Pulses, No clubbing, cyanosis, or edema  PSYCH: AAOx3  NEUROLOGY: non-focal  SKIN: No rashes or lesions        Phone: [phone]

## 2018-03-02 NOTE — PROVIDER CONTACT NOTE (OTHER) - ASSESSMENT
Pt assessed at bedside, alert, appears anxious, RR in 50s, pulse ox 92% on 4L NC, lung sounds clear, pt denies SOB, dizziness, pain

## 2018-03-03 LAB
ANION GAP SERPL CALC-SCNC: 13 MMOL/L — SIGNIFICANT CHANGE UP (ref 5–17)
BUN SERPL-MCNC: 26 MG/DL — HIGH (ref 7–23)
CALCIUM SERPL-MCNC: 8.7 MG/DL — SIGNIFICANT CHANGE UP (ref 8.4–10.5)
CHLORIDE SERPL-SCNC: 106 MMOL/L — SIGNIFICANT CHANGE UP (ref 96–108)
CO2 SERPL-SCNC: 24 MMOL/L — SIGNIFICANT CHANGE UP (ref 22–31)
CREAT SERPL-MCNC: 0.9 MG/DL — SIGNIFICANT CHANGE UP (ref 0.5–1.3)
GLUCOSE SERPL-MCNC: 93 MG/DL — SIGNIFICANT CHANGE UP (ref 70–99)
HCT VFR BLD CALC: 35.5 % — SIGNIFICANT CHANGE UP (ref 34.5–45)
HGB BLD-MCNC: 11.5 G/DL — SIGNIFICANT CHANGE UP (ref 11.5–15.5)
MCHC RBC-ENTMCNC: 29.6 PG — SIGNIFICANT CHANGE UP (ref 27–34)
MCHC RBC-ENTMCNC: 32.4 GM/DL — SIGNIFICANT CHANGE UP (ref 32–36)
MCV RBC AUTO: 91.3 FL — SIGNIFICANT CHANGE UP (ref 80–100)
PLATELET # BLD AUTO: 273 K/UL — SIGNIFICANT CHANGE UP (ref 150–400)
POTASSIUM SERPL-MCNC: 3.9 MMOL/L — SIGNIFICANT CHANGE UP (ref 3.5–5.3)
POTASSIUM SERPL-SCNC: 3.9 MMOL/L — SIGNIFICANT CHANGE UP (ref 3.5–5.3)
RBC # BLD: 3.89 M/UL — SIGNIFICANT CHANGE UP (ref 3.8–5.2)
RBC # FLD: 16.3 % — HIGH (ref 10.3–14.5)
SODIUM SERPL-SCNC: 143 MMOL/L — SIGNIFICANT CHANGE UP (ref 135–145)
WBC # BLD: 11.45 K/UL — HIGH (ref 3.8–10.5)
WBC # FLD AUTO: 11.45 K/UL — HIGH (ref 3.8–10.5)

## 2018-03-03 RX ADMIN — Medication 40 MILLIGRAM(S): at 05:54

## 2018-03-03 RX ADMIN — HEPARIN SODIUM 5000 UNIT(S): 5000 INJECTION INTRAVENOUS; SUBCUTANEOUS at 05:54

## 2018-03-03 RX ADMIN — Medication 1 MILLIGRAM(S): at 21:54

## 2018-03-03 RX ADMIN — FAMOTIDINE 20 MILLIGRAM(S): 10 INJECTION INTRAVENOUS at 12:04

## 2018-03-03 RX ADMIN — HEPARIN SODIUM 5000 UNIT(S): 5000 INJECTION INTRAVENOUS; SUBCUTANEOUS at 13:55

## 2018-03-03 RX ADMIN — HEPARIN SODIUM 5000 UNIT(S): 5000 INJECTION INTRAVENOUS; SUBCUTANEOUS at 21:54

## 2018-03-03 RX ADMIN — Medication 3 MILLILITER(S): at 12:04

## 2018-03-03 RX ADMIN — Medication 3 MILLILITER(S): at 05:54

## 2018-03-03 RX ADMIN — CETIRIZINE HYDROCHLORIDE 5 MILLIGRAM(S): 10 TABLET ORAL at 12:04

## 2018-03-03 RX ADMIN — Medication 50 MICROGRAM(S): at 05:54

## 2018-03-03 RX ADMIN — Medication 3 MILLILITER(S): at 23:14

## 2018-03-03 RX ADMIN — RISPERIDONE 1 MILLIGRAM(S): 4 TABLET ORAL at 17:02

## 2018-03-03 RX ADMIN — Medication 3 MILLILITER(S): at 17:02

## 2018-03-03 RX ADMIN — RISPERIDONE 1 MILLIGRAM(S): 4 TABLET ORAL at 05:54

## 2018-03-03 NOTE — PROGRESS NOTE ADULT - ASSESSMENT
68yr old female with advanced Alzheimer's dementia, paranoid schizophrenia, hypothyroidism, PVD, who was sent from [facility] for tachypnea and hypoxia    1. Hypoxia   likely from viral illness   + RSV   chest xray revealing no focal consolidation   CTA chest neg for PE   no evidence of decomp CHF on exam   echo revealing normal LV sys fx , mod diastolic dysfx   events from earlier noted, likely anxiety driven   BC neg  pulm f/u     2. cv stable   no chest pain or sob.  EKG no evidence of acute ischemia/ACS  no evidence of decomp CHF on exam   echo revealing normal LV sys fx , mod diastolic dysfx     dvt ppx

## 2018-03-03 NOTE — PROGRESS NOTE ADULT - ASSESSMENT
Schizophrenia with anxiety  No clinical suspicion of pulmonary infection  Leukocytosis with Proteus Bacteuria  Dementia    REC    Contunue nebulizers  No need for abx for lung  CHeck RA sats

## 2018-03-03 NOTE — PROGRESS NOTE ADULT - ASSESSMENT
· Assessment	  This is a 68yr old female with advanced Alzheimer's dementia, paranoid schizophrenia, hypothyroidism, PVD, who was sent from [facility] for tachypnea and hypoxia     Problem/Plan - 1:  ·  Problem: Sepsis, due to unspecified organism. Resolved.  -     Problem/Plan - 2:  ·  Problem: Tachypnea.  Plan: Pt with tachypnea 30-50s, also with tachycardia and hypoxia. No calf tenderness. Wells score at least 3.     - if negative, likely 2/2 anxiety.      Problem/Plan - 3:  ·  Problem: RSV bronchitis.  Plan: Duoneb. Pul f/up noted.     Problem/Plan - 4:  ·  Problem: Acute respiratory failure with hypoxia. Plan: Pt sent in for hypoxia, requiring NC O2. Unclear etiology at this time - 2/2 RSV bronchitis vs. PE vs. superimposed PNA  - Pul f/up noted.  - continue O2 support and wean off as tolerated.     Problem/Plan - 5:  ·  Problem: Paranoid schizophrenia. Plan: stable  - continue risperidone and ativan.     Problem/Plan - 6:  Problem: Hypothyroidism, unspecified type. Plan: continue synthroid.     Problem/Plan - 7:  ·  Problem: Alzheimer's dementia. Plan: at baseline.   - frequent orientation.

## 2018-03-04 LAB
ANION GAP SERPL CALC-SCNC: 11 MMOL/L — SIGNIFICANT CHANGE UP (ref 5–17)
BUN SERPL-MCNC: 24 MG/DL — HIGH (ref 7–23)
CALCIUM SERPL-MCNC: 9.2 MG/DL — SIGNIFICANT CHANGE UP (ref 8.4–10.5)
CHLORIDE SERPL-SCNC: 104 MMOL/L — SIGNIFICANT CHANGE UP (ref 96–108)
CO2 SERPL-SCNC: 27 MMOL/L — SIGNIFICANT CHANGE UP (ref 22–31)
CREAT SERPL-MCNC: 0.9 MG/DL — SIGNIFICANT CHANGE UP (ref 0.5–1.3)
GLUCOSE SERPL-MCNC: 85 MG/DL — SIGNIFICANT CHANGE UP (ref 70–99)
HCT VFR BLD CALC: 39.2 % — SIGNIFICANT CHANGE UP (ref 34.5–45)
HGB BLD-MCNC: 12.9 G/DL — SIGNIFICANT CHANGE UP (ref 11.5–15.5)
MCHC RBC-ENTMCNC: 30.3 PG — SIGNIFICANT CHANGE UP (ref 27–34)
MCHC RBC-ENTMCNC: 32.9 GM/DL — SIGNIFICANT CHANGE UP (ref 32–36)
MCV RBC AUTO: 92 FL — SIGNIFICANT CHANGE UP (ref 80–100)
PLATELET # BLD AUTO: 291 K/UL — SIGNIFICANT CHANGE UP (ref 150–400)
POTASSIUM SERPL-MCNC: 4.3 MMOL/L — SIGNIFICANT CHANGE UP (ref 3.5–5.3)
POTASSIUM SERPL-SCNC: 4.3 MMOL/L — SIGNIFICANT CHANGE UP (ref 3.5–5.3)
RBC # BLD: 4.26 M/UL — SIGNIFICANT CHANGE UP (ref 3.8–5.2)
RBC # FLD: 16.1 % — HIGH (ref 10.3–14.5)
SODIUM SERPL-SCNC: 142 MMOL/L — SIGNIFICANT CHANGE UP (ref 135–145)
WBC # BLD: 14.31 K/UL — HIGH (ref 3.8–10.5)
WBC # FLD AUTO: 14.31 K/UL — HIGH (ref 3.8–10.5)

## 2018-03-04 RX ADMIN — Medication 50 MICROGRAM(S): at 05:57

## 2018-03-04 RX ADMIN — Medication 3 MILLILITER(S): at 05:57

## 2018-03-04 RX ADMIN — HEPARIN SODIUM 5000 UNIT(S): 5000 INJECTION INTRAVENOUS; SUBCUTANEOUS at 13:19

## 2018-03-04 RX ADMIN — RISPERIDONE 1 MILLIGRAM(S): 4 TABLET ORAL at 05:57

## 2018-03-04 RX ADMIN — CETIRIZINE HYDROCHLORIDE 5 MILLIGRAM(S): 10 TABLET ORAL at 11:55

## 2018-03-04 RX ADMIN — HEPARIN SODIUM 5000 UNIT(S): 5000 INJECTION INTRAVENOUS; SUBCUTANEOUS at 05:57

## 2018-03-04 RX ADMIN — Medication 3 MILLILITER(S): at 11:53

## 2018-03-04 RX ADMIN — FAMOTIDINE 20 MILLIGRAM(S): 10 INJECTION INTRAVENOUS at 11:53

## 2018-03-04 RX ADMIN — Medication 1 MILLIGRAM(S): at 21:38

## 2018-03-04 RX ADMIN — Medication 40 MILLIGRAM(S): at 05:57

## 2018-03-04 RX ADMIN — Medication 3 MILLILITER(S): at 23:07

## 2018-03-04 RX ADMIN — HEPARIN SODIUM 5000 UNIT(S): 5000 INJECTION INTRAVENOUS; SUBCUTANEOUS at 21:38

## 2018-03-04 RX ADMIN — Medication 3 MILLILITER(S): at 18:23

## 2018-03-04 RX ADMIN — RISPERIDONE 1 MILLIGRAM(S): 4 TABLET ORAL at 21:38

## 2018-03-04 NOTE — PROGRESS NOTE ADULT - ASSESSMENT
· Assessment	  This is a 68yr old female with advanced Alzheimer's dementia, paranoid schizophrenia, hypothyroidism, PVD, who was sent from [facility] for tachypnea and hypoxia     Problem/Plan - 1:  ·  Problem: Sepsis, due to unspecified organism. Resolved.  -     Problem/Plan - 2:  ·  Problem: Tachypnea.  Plan: Pt with tachypnea 30-50s, also with tachycardia and hypoxia. No calf tenderness. Wells score at least 3.     - if negative, likely 2/2 anxiety.      Problem/Plan - 3:  ·  Problem: RSV bronchitis.  Plan: Duoneb. Pul f/up noted.     Problem/Plan - 4:  ·  Problem: Acute respiratory failure with hypoxia.Improving Plan: Pt sent in for hypoxia, requiring NC O2. Unclear etiology at this time - 2/2 RSV bronchitis vs. PE vs. superimposed PNA  - Pul f/up noted.  - continue O2 support and wean off as tolerated.     Problem/Plan - 5:  ·  Problem: Paranoid schizophrenia. Plan: stable  - continue risperidone and ativan.     Problem/Plan - 6:  Problem: Hypothyroidism, unspecified type. Plan: continue synthroid.     Problem/Plan - 7:  ·  Problem: Alzheimer's dementia. Plan: at baseline.   - frequent orientation.

## 2018-03-04 NOTE — PROGRESS NOTE ADULT - ASSESSMENT
68yr old female with advanced Alzheimer's dementia, paranoid schizophrenia, hypothyroidism, PVD, who was sent from [facility] for tachypnea and hypoxia    1. Hypoxia   likely from viral illness   + RSV   chest xray revealing no focal consolidation   CTA chest neg for PE   no evidence of decomp CHF on exam   echo revealing normal LV sys fx , mod diastolic dysfx   events from earlier noted, likely anxiety driven   BC neg  pulm f/u     2. cv stable   no chest pain or sob.  EKG no evidence of acute ischemia/ACS  no evidence of decomp CHF on exam   echo revealing normal LV sys fx , mod diastolic dysfx     dvt ppx

## 2018-03-05 LAB
ANION GAP SERPL CALC-SCNC: 13 MMOL/L — SIGNIFICANT CHANGE UP (ref 5–17)
APPEARANCE UR: CLEAR — SIGNIFICANT CHANGE UP
BILIRUB UR-MCNC: NEGATIVE — SIGNIFICANT CHANGE UP
BUN SERPL-MCNC: 22 MG/DL — SIGNIFICANT CHANGE UP (ref 7–23)
CALCIUM SERPL-MCNC: 8.9 MG/DL — SIGNIFICANT CHANGE UP (ref 8.4–10.5)
CHLORIDE SERPL-SCNC: 104 MMOL/L — SIGNIFICANT CHANGE UP (ref 96–108)
CO2 SERPL-SCNC: 25 MMOL/L — SIGNIFICANT CHANGE UP (ref 22–31)
COLOR SPEC: YELLOW — SIGNIFICANT CHANGE UP
CREAT SERPL-MCNC: 0.87 MG/DL — SIGNIFICANT CHANGE UP (ref 0.5–1.3)
CULTURE RESULTS: SIGNIFICANT CHANGE UP
CULTURE RESULTS: SIGNIFICANT CHANGE UP
DIFF PNL FLD: NEGATIVE — SIGNIFICANT CHANGE UP
GLUCOSE SERPL-MCNC: 90 MG/DL — SIGNIFICANT CHANGE UP (ref 70–99)
GLUCOSE UR QL: NEGATIVE MG/DL — SIGNIFICANT CHANGE UP
HCT VFR BLD CALC: 35.7 % — SIGNIFICANT CHANGE UP (ref 34.5–45)
HGB BLD-MCNC: 11.3 G/DL — LOW (ref 11.5–15.5)
KETONES UR-MCNC: NEGATIVE — SIGNIFICANT CHANGE UP
LEUKOCYTE ESTERASE UR-ACNC: ABNORMAL
MCHC RBC-ENTMCNC: 29 PG — SIGNIFICANT CHANGE UP (ref 27–34)
MCHC RBC-ENTMCNC: 31.7 GM/DL — LOW (ref 32–36)
MCV RBC AUTO: 91.5 FL — SIGNIFICANT CHANGE UP (ref 80–100)
NITRITE UR-MCNC: NEGATIVE — SIGNIFICANT CHANGE UP
PH UR: 8 — SIGNIFICANT CHANGE UP (ref 5–8)
PLATELET # BLD AUTO: 290 K/UL — SIGNIFICANT CHANGE UP (ref 150–400)
POTASSIUM SERPL-MCNC: 4 MMOL/L — SIGNIFICANT CHANGE UP (ref 3.5–5.3)
POTASSIUM SERPL-SCNC: 4 MMOL/L — SIGNIFICANT CHANGE UP (ref 3.5–5.3)
PROT UR-MCNC: NEGATIVE MG/DL — SIGNIFICANT CHANGE UP
RBC # BLD: 3.9 M/UL — SIGNIFICANT CHANGE UP (ref 3.8–5.2)
RBC # FLD: 15.8 % — HIGH (ref 10.3–14.5)
SODIUM SERPL-SCNC: 142 MMOL/L — SIGNIFICANT CHANGE UP (ref 135–145)
SP GR SPEC: 1.01 — SIGNIFICANT CHANGE UP (ref 1.01–1.02)
SPECIMEN SOURCE: SIGNIFICANT CHANGE UP
SPECIMEN SOURCE: SIGNIFICANT CHANGE UP
UROBILINOGEN FLD QL: NEGATIVE MG/DL — SIGNIFICANT CHANGE UP
WBC # BLD: 11.92 K/UL — HIGH (ref 3.8–10.5)
WBC # FLD AUTO: 11.92 K/UL — HIGH (ref 3.8–10.5)

## 2018-03-05 RX ADMIN — Medication 3 MILLILITER(S): at 17:54

## 2018-03-05 RX ADMIN — Medication 3 MILLILITER(S): at 05:31

## 2018-03-05 RX ADMIN — Medication 3 MILLILITER(S): at 13:10

## 2018-03-05 RX ADMIN — RISPERIDONE 1 MILLIGRAM(S): 4 TABLET ORAL at 05:32

## 2018-03-05 RX ADMIN — CETIRIZINE HYDROCHLORIDE 5 MILLIGRAM(S): 10 TABLET ORAL at 13:09

## 2018-03-05 RX ADMIN — HEPARIN SODIUM 5000 UNIT(S): 5000 INJECTION INTRAVENOUS; SUBCUTANEOUS at 23:09

## 2018-03-05 RX ADMIN — HEPARIN SODIUM 5000 UNIT(S): 5000 INJECTION INTRAVENOUS; SUBCUTANEOUS at 13:09

## 2018-03-05 RX ADMIN — HEPARIN SODIUM 5000 UNIT(S): 5000 INJECTION INTRAVENOUS; SUBCUTANEOUS at 05:31

## 2018-03-05 RX ADMIN — Medication 40 MILLIGRAM(S): at 05:35

## 2018-03-05 RX ADMIN — RISPERIDONE 1 MILLIGRAM(S): 4 TABLET ORAL at 17:54

## 2018-03-05 RX ADMIN — Medication 3 MILLILITER(S): at 23:09

## 2018-03-05 RX ADMIN — Medication 1 MILLIGRAM(S): at 23:09

## 2018-03-05 RX ADMIN — FAMOTIDINE 20 MILLIGRAM(S): 10 INJECTION INTRAVENOUS at 15:21

## 2018-03-05 RX ADMIN — Medication 50 MICROGRAM(S): at 05:32

## 2018-03-05 NOTE — PROGRESS NOTE ADULT - ASSESSMENT
68yr old female with advanced Alzheimer's dementia, paranoid schizophrenia, hypothyroidism, PVD, who was sent from [facility] for tachypnea and hypoxia    1. Hypoxia   likely from viral illness   + RSV   chest xray revealing no focal consolidation   CTA chest neg for PE   no evidence of decomp CHF on exam   echo revealing normal LV sys fx , mod diastolic dysfx   BC neg  pulm f/u     2. cv stable   no chest pain or sob.  EKG no evidence of acute ischemia/ACS  no evidence of decomp CHF on exam   echo revealing normal LV sys fx , mod diastolic dysfx     dvt ppx 68yr old female with advanced Alzheimer's dementia, paranoid schizophrenia, hypothyroidism, PVD, who was sent from [facility] for tachypnea and hypoxia    1. Hypoxia   likely from viral illness   + RSV   chest xray revealing no focal consolidation   CTA chest neg for PE   no evidence of decomp CHF on exam   echo revealing normal LV sys fx , mod diastolic dysfx   pulm f/u     2. cv stable   no chest pain or sob.  EKG no evidence of acute ischemia/ACS  no evidence of decomp CHF on exam   echo revealing normal LV sys fx , mod diastolic dysfx     dvt ppx

## 2018-03-05 NOTE — PROGRESS NOTE ADULT - ASSESSMENT
· Assessment	  This is a 68yr old female with advanced Alzheimer's dementia, paranoid schizophrenia, hypothyroidism, PVD, who was sent from [facility] for tachypnea and hypoxia     Problem/Plan - 1:  ·  Problem: Sepsis, due to unspecified organism. Resolved.  -     Problem/Plan - 2:  ·  Problem: Tachypnea.  Plan: Pt with tachypnea 30-50s, also with tachycardia and hypoxia. No calf tenderness. Wells score at least 3.     - if negative, likely 2/2 anxiety.      Problem/Plan - 3:  ·  Problem: RSV bronchitis.  Plan: Duoneb. Pul f/up noted.     Problem/Plan - 4:  ·  Problem: Acute respiratory failure with hypoxia.Improving Plan: Pt sent in for hypoxia, requiring NC O2. Unclear etiology at this time - 2/2 RSV bronchitis vs. PE vs. superimposed PNA  - Pul f/up noted.  - continue O2 support and wean off as tolerated.     Problem/Plan - 5:  ·  Problem: Paranoid schizophrenia. Plan: stable  - continue risperidone and ativan.     Problem/Plan - 6:  Problem: Hypothyroidism, unspecified type. Plan: continue synthroid.     Problem/Plan - 7:  ·  Problem: Alzheimer's dementia. Plan: at baseline.   - frequent orientation.

## 2018-03-06 ENCOUNTER — TRANSCRIPTION ENCOUNTER (OUTPATIENT)
Age: 68
End: 2018-03-06

## 2018-03-06 LAB
ANION GAP SERPL CALC-SCNC: 9 MMOL/L — SIGNIFICANT CHANGE UP (ref 5–17)
BUN SERPL-MCNC: 22 MG/DL — SIGNIFICANT CHANGE UP (ref 7–23)
CALCIUM SERPL-MCNC: 9 MG/DL — SIGNIFICANT CHANGE UP (ref 8.4–10.5)
CHLORIDE SERPL-SCNC: 102 MMOL/L — SIGNIFICANT CHANGE UP (ref 96–108)
CO2 SERPL-SCNC: 30 MMOL/L — SIGNIFICANT CHANGE UP (ref 22–31)
CREAT SERPL-MCNC: 0.93 MG/DL — SIGNIFICANT CHANGE UP (ref 0.5–1.3)
GLUCOSE SERPL-MCNC: 93 MG/DL — SIGNIFICANT CHANGE UP (ref 70–99)
HCT VFR BLD CALC: 36 % — SIGNIFICANT CHANGE UP (ref 34.5–45)
HGB BLD-MCNC: 11.5 G/DL — SIGNIFICANT CHANGE UP (ref 11.5–15.5)
MCHC RBC-ENTMCNC: 29.4 PG — SIGNIFICANT CHANGE UP (ref 27–34)
MCHC RBC-ENTMCNC: 31.9 GM/DL — LOW (ref 32–36)
MCV RBC AUTO: 92.1 FL — SIGNIFICANT CHANGE UP (ref 80–100)
PLATELET # BLD AUTO: 282 K/UL — SIGNIFICANT CHANGE UP (ref 150–400)
POTASSIUM SERPL-MCNC: 4 MMOL/L — SIGNIFICANT CHANGE UP (ref 3.5–5.3)
POTASSIUM SERPL-SCNC: 4 MMOL/L — SIGNIFICANT CHANGE UP (ref 3.5–5.3)
RBC # BLD: 3.91 M/UL — SIGNIFICANT CHANGE UP (ref 3.8–5.2)
RBC # FLD: 15.8 % — HIGH (ref 10.3–14.5)
SODIUM SERPL-SCNC: 141 MMOL/L — SIGNIFICANT CHANGE UP (ref 135–145)
WBC # BLD: 11.19 K/UL — HIGH (ref 3.8–10.5)
WBC # FLD AUTO: 11.19 K/UL — HIGH (ref 3.8–10.5)

## 2018-03-06 RX ORDER — FUROSEMIDE 40 MG
20 TABLET ORAL DAILY
Qty: 0 | Refills: 0 | Status: DISCONTINUED | OUTPATIENT
Start: 2018-03-06 | End: 2018-03-12

## 2018-03-06 RX ORDER — RISPERIDONE 4 MG/1
1 TABLET ORAL
Qty: 0 | Refills: 0 | DISCHARGE
Start: 2018-03-06

## 2018-03-06 RX ORDER — IPRATROPIUM/ALBUTEROL SULFATE 18-103MCG
3 AEROSOL WITH ADAPTER (GRAM) INHALATION
Qty: 540 | Refills: 0
Start: 2018-03-06 | End: 2018-04-04

## 2018-03-06 RX ORDER — RISPERIDONE 4 MG/1
0.5 TABLET ORAL
Qty: 0 | Refills: 0 | DISCHARGE
Start: 2018-03-06

## 2018-03-06 RX ORDER — CETIRIZINE HYDROCHLORIDE 10 MG/1
1 TABLET ORAL
Qty: 30 | Refills: 0
Start: 2018-03-06 | End: 2018-04-04

## 2018-03-06 RX ADMIN — FAMOTIDINE 20 MILLIGRAM(S): 10 INJECTION INTRAVENOUS at 13:44

## 2018-03-06 RX ADMIN — CETIRIZINE HYDROCHLORIDE 5 MILLIGRAM(S): 10 TABLET ORAL at 13:44

## 2018-03-06 RX ADMIN — Medication 50 MICROGRAM(S): at 06:52

## 2018-03-06 RX ADMIN — Medication 1 MILLIGRAM(S): at 22:00

## 2018-03-06 RX ADMIN — HEPARIN SODIUM 5000 UNIT(S): 5000 INJECTION INTRAVENOUS; SUBCUTANEOUS at 22:00

## 2018-03-06 RX ADMIN — Medication 3 MILLILITER(S): at 13:05

## 2018-03-06 RX ADMIN — HEPARIN SODIUM 5000 UNIT(S): 5000 INJECTION INTRAVENOUS; SUBCUTANEOUS at 13:44

## 2018-03-06 RX ADMIN — Medication 20 MILLIGRAM(S): at 17:31

## 2018-03-06 RX ADMIN — Medication 40 MILLIGRAM(S): at 06:52

## 2018-03-06 RX ADMIN — HEPARIN SODIUM 5000 UNIT(S): 5000 INJECTION INTRAVENOUS; SUBCUTANEOUS at 06:52

## 2018-03-06 RX ADMIN — RISPERIDONE 1 MILLIGRAM(S): 4 TABLET ORAL at 17:31

## 2018-03-06 RX ADMIN — RISPERIDONE 1 MILLIGRAM(S): 4 TABLET ORAL at 06:52

## 2018-03-06 RX ADMIN — Medication 3 MILLILITER(S): at 06:52

## 2018-03-06 RX ADMIN — Medication 3 MILLILITER(S): at 17:31

## 2018-03-06 NOTE — DISCHARGE NOTE ADULT - MEDICATION SUMMARY - MEDICATIONS TO STOP TAKING
I will STOP taking the medications listed below when I get home from the hospital:    predniSONE 10 mg oral tablet  -- 2 tab(s) by mouth 2 times a day x2 days  3 tabs by mouth daily x2 days  1 tab by mouth daily x 2 days  -- It is very important that you take or use this exactly as directed.  Do not skip doses or discontinue unless directed by your doctor.  Obtain medical advice before taking any non-prescription drugs as some may affect the action of this medication.  Take with food or milk.    Advair Diskus 100 mcg-50 mcg inhalation powder  -- 1 puff(s) inhaled 2 times a day   -- Check with your doctor before becoming pregnant.  For inhalation only.  Obtain medical advice before taking any non-prescription drugs as some may affect the action of this medication.  Rinse mouth thoroughly after use.

## 2018-03-06 NOTE — DISCHARGE NOTE ADULT - PATIENT PORTAL LINK FT
You can access the [facility] Portal, offered by [facility], by registering with the following website: [email or web address]

## 2018-03-06 NOTE — PROGRESS NOTE ADULT - ASSESSMENT
68yr old female with advanced Alzheimer's dementia, paranoid schizophrenia, hypothyroidism, PVD, who was sent from [facility] for tachypnea and hypoxia    1. Hypoxia   likely from viral illness   + RSV   chest xray revealing no focal consolidation   CTA chest neg for PE   no evidence of decomp CHF on exam   echo revealing normal LV sys fx , mod diastolic dysfx   pulm f/u     2. cv stable   no chest pain or sob.  EKG no evidence of acute ischemia/ACS  no evidence of decomp CHF on exam   echo revealing normal LV sys fx , mod diastolic dysfx     dvt ppx

## 2018-03-06 NOTE — DISCHARGE NOTE ADULT - SECONDARY DIAGNOSIS.
Acute respiratory failure with hypoxia Hypothyroidism, unspecified type Alzheimer's dementia without behavioral disturbance, unspecified timing of dementia onset Acute cystitis without hematuria

## 2018-03-06 NOTE — PROGRESS NOTE ADULT - ASSESSMENT
· Assessment	  This is a 68yr old female with advanced Alzheimer's dementia, paranoid schizophrenia, hypothyroidism, PVD, who was sent from [facility] for tachypnea and hypoxia     Problem/Plan - 1:  ·  Problem: Sepsis due to RSV broncholitis.   Resolved.  -     Problem/Plan - 2:  ·  Problem: Tachypnea.  Plan: Pt with tachypnea 30-50s, also with tachycardia and hypoxia. No calf tenderness. Wells score at least 3.     - if negative, likely 2/2 anxiety.      Problem/Plan - 3:  ·  Problem: RSV bronchitis.  Plan: Duoneb. Pul f/up noted.     Problem/Plan - 4:  ·  Problem: Acute respiratory failure with hypoxia.Improving Plan: Pt sent in for hypoxia, requiring NC O2. Unclear etiology at this time - 2/2 RSV bronchitis vs. PE vs. superimposed PNA  - Pul f/up noted.  - continue O2 support and wean off as tolerated.     Problem/Plan - 5:  ·  Problem: Paranoid schizophrenia. Plan: stable  - continue risperidone and ativan.     Problem/Plan - 6:  Problem: Hypothyroidism, unspecified type. Plan: continue synthroid.     Problem/Plan - 7:  ·  Problem: Alzheimer's dementia. Plan: at baseline.   - frequent orientation.

## 2018-03-06 NOTE — DISCHARGE NOTE ADULT - HOME CARE AGENCY
[place] [phone] for RN visit to assess for P/T evaluation for start of care the day after discharge

## 2018-03-06 NOTE — DISCHARGE NOTE ADULT - CARE PLAN
Principal Discharge DX:	Sepsis, due to unspecified organism  Goal:	To prevent end-organ damage secondary to sepsis  Assessment and plan of treatment:	Secondary to RSV infection, now resolved  DC prednisone-No need for antibiotics for upper respiratory infection  Continue supportive therapy use of duonebs as needed  Follow-up with your PCP within 1 week for further management  Secondary Diagnosis:	Acute respiratory failure with hypoxia  Assessment and plan of treatment:	Hypoxia with resolution on oxygen therapy. Unclear etiology at this time, likely 2/2 RSV bronchitis, no signs of decompensated heart failure.  Follow-up with your PCP as directed  Secondary Diagnosis:	Hypothyroidism, unspecified type  Assessment and plan of treatment:	-Continue Synthroid as directed  Secondary Diagnosis:	Alzheimer's dementia without behavioral disturbance, unspecified timing of dementia onset  Assessment and plan of treatment:	HOME CARE INSTRUCTIONS  The care of individuals with dementia is varied and dependent upon the progression of the dementia. The following suggestions are intended for the person living with, or caring for, the person with dementia.  Create a safe environment.  Remove the locks on bathroom doors to prevent the person from accidentally locking himself or herself in.  Use childproof latches on kitchen cabinets and any place where cleaning supplies, chemicals, or alcohol are kept.  Use childproof covers in unused electrical outlets.  Install childproof devices to keep doors and windows secured.  Remove stove knobs or install safety knobs and an automatic shut-off on the stove.  Lower the temperature on water heaters.  Label medicines and keep them locked up.  Secure knives, lighters, matches, power tools, and guns, and keep these items out of reach.  Keep the house free from clutter. Remove rugs or anything that might contribute to a fall.  Remove objects that might break and hurt the person.  Make sure lighting is good, both inside and outside.  Install grab rails as needed.  Use a monitoring device to alert you to falls or other needs for help.   Reduce confusion.  Keep familiar objects and people around.  Use night lights or dim lights at night.  Secondary Diagnosis:	Acute cystitis without hematuria  Assessment and plan of treatment:	Continue your course of Levaquin as directed. Finish the medication even if you feel better after you have only taken some of the medication.  Drink enough water and fluids to keep your urine clear or pale yellow.  Avoid caffeine, tea, and carbonated beverages. They tend to irritate your bladder.  Empty your bladder often. Avoid holding urine for long periods of time.  Empty your bladder before and after sexual intercourse.  After a bowel movement, women should cleanse from front to back. Use each tissue only once.  SEEK MEDICAL CARE IF:  You have back pain.  You develop a fever.  Your symptoms do not begin to resolve within 3 days.  SEEK IMMEDIATE MEDICAL CARE IF:  You have severe back pain or lower abdominal pain.  You develop chills.  You have nausea or vomiting.  You have continued burning or discomfort with urination.

## 2018-03-06 NOTE — DISCHARGE NOTE ADULT - CARE PROVIDER_API CALL
Yoandy Theodore (MD), Internal Medicine  [address] [place] [address]  Phone: [phone]  Fax: [phone]

## 2018-03-06 NOTE — DISCHARGE NOTE ADULT - MEDICATION SUMMARY - MEDICATIONS TO TAKE
I will START or STAY ON the medications listed below when I get home from the hospital:    LORazepam 1 mg oral tablet  -- 1 tab(s) by mouth once a day (at bedtime) MDD:1 tab daily  -- Indication: For Paranoid schizophrenia    cetirizine 5 mg oral tablet  -- 1 tab(s) by mouth once a day  -- Indication: For Antihistamine    risperiDONE 1 mg/mL oral solution  -- 1 milliliter(s) by mouth 2 times a day  -- Indication: For Paranoid schizophrenia    QUEtiapine  -- 12.5 milligram(s) by mouth every 6 hours, As Needed for anxiety  -- Indication: For Paranoid schizophrenia    ipratropium-albuterol 0.5 mg-2.5 mg/3 mLinhalation solution  -- 3 milliliter(s) inhaled every 6 hours, As Needed for shortness of breath  -- Indication: For Shortness of breath    raNITIdine 150 mg oral capsule  -- 1 cap(s) by mouth once a day  -- Indication: For GI prophylaxis    levothyroxine 50 mcg (0.05 mg) oral tablet  -- 1 tab(s) by mouth once a day  -- Indication: For Hypothyroidism, unspecified type

## 2018-03-06 NOTE — PROGRESS NOTE ADULT - ASSESSMENT
Schizophrenia with anxiety  No clinical suspicion of pulmonary infection  Diastolic CHF: intermittent tachynpnea may be partially related  Dementia    REC    Contunue nebulizers  No need for abx for lung: Levaquin for ? UTI  CHeck RA sats  Consider low dose lasix for diastolic CHF Schizophrenia with anxiety  No clinical suspicion of pulmonary infection  Diastolic CHF: intermittent tachynpnea may be partially related  Dementia    REC    Contunue nebulizers  DC prednisone  No need for abx for lung: Levaquin for ? UTI  CHeck RA sats  Consider low dose lasix for diastolic CHF

## 2018-03-06 NOTE — PROVIDER CONTACT NOTE (OTHER) - SITUATION
Pulse oximetry Room air - off o2 87% at rest  put observed to inc breath per min while amb (25-34) [id number]

## 2018-03-06 NOTE — DISCHARGE NOTE ADULT - HOSPITAL COURSE
68yr old female with advanced Alzheimer's dementia, paranoid schizophrenia, hypothyroidism, PVD, who was sent from [facility] for tachypnea and hypoxia. No documented SpO2 from transfer note. On arrival, pt noted to be breathing at 38-40s, tachycardic to 100s, mild hypoxia 90% on RA. Pt herself denies any symptoms - denies fever/chills, chest pain, sob, abdominal pain, palpitation, n/v/c/d. Of note, pt was recently hospitalized 2/16-2/22 for RSV bronchitis s/p steroid treatment.     In ED  Vitals: -145/79-96, HR , RR 30-50, Tm 98.5, SpO2 90% on RA -> 94% on 2L NC  Meds: duoneb x3, solumedrol 125 68yr old female with advanced Alzheimer's dementia, paranoid schizophrenia, hypothyroidism, PVD, who was sent from [facility] for tachypnea and hypoxia. On arrival, pt noted to be breathing at 38-40s, tachycardic to 100s, mild hypoxia 90% on RA, diagnosed with RSV bronchitis. During hospital course, work-up revealed no clinical suspicion of pulmonary infection, no signs of cardiac ischemia, CTA chest neg for PE, no evidence of decompensated CHF on exam, and echocardiogram revealing normal LV systolic function with moderated diastolic dysfunction. Pt subsequently found with (+) UA, started on PO levaquin, considerable clinical respiratory improvement with supportive therapy, now deemed clinically stable for d/c back to [facility] living with follow-up with her PCP x 1 week. 68yr old female with advanced Alzheimer's dementia, paranoid schizophrenia, hypothyroidism, PVD, who was sent from [facility] for tachypnea and hypoxia. On arrival, pt noted to be breathing at 38-40s, tachycardic to 100s, mild hypoxia 90% on RA, diagnosed with RSV bronchitis. During hospital course work-up revealed no clinical suspicion of pulmonary infection, no signs of cardiac ischemia, CTA chest neg for PE, no evidence of decompensated CHF on exam, and echocardiogram revealing normal LV systolic function with moderated diastolic dysfunction. Pt subsequently found with (+) UA, started on PO levaquin, considerable clinical respiratory improvement with supportive therapy.  Noted to have Intermittent desaturation and rapid shallow breathing associated with periods of anxiety induced requiring PRN seroquel.    Patient comfortable on room air, no respiratory distress, medically cleared for discharge with follow up with PMD in 1 week 68yr old female with advanced Alzheimer's dementia, paranoid schizophrenia, hypothyroidism, PVD, who was sent from [facility] for tachypnea and hypoxia. On arrival, pt noted to be breathing at 38-40s, tachycardic to 100s, mild hypoxia 90% on RA, diagnosed with RSV bronchitis. During hospital course work-up revealed no clinical suspicion of pulmonary infection, no signs of cardiac ischemia, CTA chest neg for PE, no evidence of decompensated CHF on exam, and echocardiogram revealing normal LV systolic function with moderated diastolic dysfunction. Pt subsequently found with (+) UA, started on PO levaquin, considerable clinical respiratory improvement with supportive therapy.  Noted to have Intermittent desaturation and rapid shallow breathing associated with periods of anxiety induced requiring PRN seroquel and PRN Oxygen 2L via nasal canula.    Medically cleared for discharge with follow up with PMD in 1 week

## 2018-03-06 NOTE — DISCHARGE NOTE ADULT - PLAN OF CARE
To prevent end-organ damage secondary to sepsis Secondary to RSV infection, now resolved  DC prednisone-No need for antibiotics for upper respiratory infection  Continue supportive therapy use of duonebs as needed  Follow-up with your PCP within 1 week for further management Hypoxia with resolution on oxygen therapy. Unclear etiology at this time, likely 2/2 RSV bronchitis, no signs of decompensated heart failure.  Follow-up with your PCP as directed -Continue Synthroid as directed HOME CARE INSTRUCTIONS  The care of individuals with dementia is varied and dependent upon the progression of the dementia. The following suggestions are intended for the person living with, or caring for, the person with dementia.  Create a safe environment.  Remove the locks on bathroom doors to prevent the person from accidentally locking himself or herself in.  Use childproof latches on kitchen cabinets and any place where cleaning supplies, chemicals, or alcohol are kept.  Use childproof covers in unused electrical outlets.  Install childproof devices to keep doors and windows secured.  Remove stove knobs or install safety knobs and an automatic shut-off on the stove.  Lower the temperature on water heaters.  Label medicines and keep them locked up.  Secure knives, lighters, matches, power tools, and guns, and keep these items out of reach.  Keep the house free from clutter. Remove rugs or anything that might contribute to a fall.  Remove objects that might break and hurt the person.  Make sure lighting is good, both inside and outside.  Install grab rails as needed.  Use a monitoring device to alert you to falls or other needs for help.   Reduce confusion.  Keep familiar objects and people around.  Use night lights or dim lights at night. Continue your course of Levaquin as directed. Finish the medication even if you feel better after you have only taken some of the medication.  Drink enough water and fluids to keep your urine clear or pale yellow.  Avoid caffeine, tea, and carbonated beverages. They tend to irritate your bladder.  Empty your bladder often. Avoid holding urine for long periods of time.  Empty your bladder before and after sexual intercourse.  After a bowel movement, women should cleanse from front to back. Use each tissue only once.  SEEK MEDICAL CARE IF:  You have back pain.  You develop a fever.  Your symptoms do not begin to resolve within 3 days.  SEEK IMMEDIATE MEDICAL CARE IF:  You have severe back pain or lower abdominal pain.  You develop chills.  You have nausea or vomiting.  You have continued burning or discomfort with urination.

## 2018-03-07 LAB
-  AMIKACIN: SIGNIFICANT CHANGE UP
-  AMPICILLIN/SULBACTAM: SIGNIFICANT CHANGE UP
-  AMPICILLIN: SIGNIFICANT CHANGE UP
-  AZTREONAM: SIGNIFICANT CHANGE UP
-  CEFAZOLIN: SIGNIFICANT CHANGE UP
-  CEFEPIME: SIGNIFICANT CHANGE UP
-  CEFOXITIN: SIGNIFICANT CHANGE UP
-  CEFTAZIDIME: SIGNIFICANT CHANGE UP
-  CEFTRIAXONE: SIGNIFICANT CHANGE UP
-  CIPROFLOXACIN: SIGNIFICANT CHANGE UP
-  ERTAPENEM: SIGNIFICANT CHANGE UP
-  GENTAMICIN: SIGNIFICANT CHANGE UP
-  LEVOFLOXACIN: SIGNIFICANT CHANGE UP
-  MEROPENEM: SIGNIFICANT CHANGE UP
-  NITROFURANTOIN: SIGNIFICANT CHANGE UP
-  PIPERACILLIN/TAZOBACTAM: SIGNIFICANT CHANGE UP
-  TOBRAMYCIN: SIGNIFICANT CHANGE UP
-  TRIMETHOPRIM/SULFAMETHOXAZOLE: SIGNIFICANT CHANGE UP
CULTURE RESULTS: SIGNIFICANT CHANGE UP
METHOD TYPE: SIGNIFICANT CHANGE UP
ORGANISM # SPEC MICROSCOPIC CNT: SIGNIFICANT CHANGE UP
ORGANISM # SPEC MICROSCOPIC CNT: SIGNIFICANT CHANGE UP
SPECIMEN SOURCE: SIGNIFICANT CHANGE UP

## 2018-03-07 RX ORDER — FAMOTIDINE 10 MG/ML
20 INJECTION INTRAVENOUS DAILY
Qty: 0 | Refills: 0 | Status: DISCONTINUED | OUTPATIENT
Start: 2018-03-07 | End: 2018-03-12

## 2018-03-07 RX ADMIN — HEPARIN SODIUM 5000 UNIT(S): 5000 INJECTION INTRAVENOUS; SUBCUTANEOUS at 21:31

## 2018-03-07 RX ADMIN — Medication 3 MILLILITER(S): at 12:23

## 2018-03-07 RX ADMIN — Medication 3 MILLILITER(S): at 17:42

## 2018-03-07 RX ADMIN — Medication 3 MILLILITER(S): at 06:52

## 2018-03-07 RX ADMIN — Medication 50 MICROGRAM(S): at 06:50

## 2018-03-07 RX ADMIN — HEPARIN SODIUM 5000 UNIT(S): 5000 INJECTION INTRAVENOUS; SUBCUTANEOUS at 13:23

## 2018-03-07 RX ADMIN — Medication 1 MILLIGRAM(S): at 21:32

## 2018-03-07 RX ADMIN — RISPERIDONE 1 MILLIGRAM(S): 4 TABLET ORAL at 06:52

## 2018-03-07 RX ADMIN — CETIRIZINE HYDROCHLORIDE 5 MILLIGRAM(S): 10 TABLET ORAL at 12:23

## 2018-03-07 RX ADMIN — Medication 20 MILLIGRAM(S): at 06:50

## 2018-03-07 RX ADMIN — RISPERIDONE 1 MILLIGRAM(S): 4 TABLET ORAL at 18:13

## 2018-03-07 RX ADMIN — HEPARIN SODIUM 5000 UNIT(S): 5000 INJECTION INTRAVENOUS; SUBCUTANEOUS at 06:50

## 2018-03-07 RX ADMIN — FAMOTIDINE 20 MILLIGRAM(S): 10 INJECTION INTRAVENOUS at 17:42

## 2018-03-07 RX ADMIN — Medication 3 MILLILITER(S): at 21:33

## 2018-03-07 NOTE — PROGRESS NOTE ADULT - ASSESSMENT
Schizophrenia with anxiety  No clinical suspicion of pulmonary infection  Diastolic CHF: intermittent tachynpnea may be partially related  Dementia    REC    Contunue nebulizers  DC prednisone  No need for abx for lung: Levaquin for ? UTI  CHeck RA sats - monitor since borderline (needs to be < 88% for home O2)  Continue low dose lasix per cardiology

## 2018-03-07 NOTE — PROGRESS NOTE ADULT - ASSESSMENT
This is a 68yr old female with advanced Alzheimer's dementia, paranoid schizophrenia, hypothyroidism, PVD, who was sent from [facility] for tachypnea and hypoxia     Problem/Plan - 1:  ·  Problem: Sepsis due to RSV broncholitis.   Resolved.  -     Problem/Plan - 2:  ·  Problem: Tachypnea.  Plan: Pt with tachypnea 30-50s, also with tachycardia and hypoxia. No calf tenderness. Wells score at least 3.     - if negative, likely 2/2 anxiety.      Problem/Plan - 3:  ·  Problem: RSV bronchitis.  Plan: Duoneb. Pul f/up noted.     Problem/Plan - 4:  ·  Problem: Acute respiratory failure with hypoxia.Improving Plan: Pt sent in for hypoxia, requiring NC O2. Unclear etiology at this time - 2/2 RSV bronchitis vs. PE vs. superimposed PNA  - Pul f/up noted.  - pt desaturated to 84% on ra after ambulating     Problem/Plan - 5:  ·  Problem: Paranoid schizophrenia. Plan: stable  - continue risperidone and ativan.     Problem/Plan - 6:  Problem: Hypothyroidism, unspecified type. Plan: continue synthroid.     Problem/Plan - 7:  ·  Problem: Alzheimer's dementia. Plan: at baseline.   - frequent orientation.

## 2018-03-07 NOTE — PROGRESS NOTE ADULT - ASSESSMENT
68yr old female with advanced Alzheimer's dementia, paranoid schizophrenia, hypothyroidism, PVD, who was sent from [facility] for tachypnea and hypoxia    1. Hypoxia   likely from viral illness   + RSV   chest xray revealing no focal consolidation   CTA chest neg for PE   cont low dose lasix  echo revealing normal LV sys fx , mod diastolic dysfx   pulm f/u     2. cv stable   no chest pain or sob.  EKG no evidence of acute ischemia/ACS  echo revealing normal LV sys fx , mod diastolic dysfx     dvt ppx

## 2018-03-08 LAB
ANION GAP SERPL CALC-SCNC: 14 MMOL/L — SIGNIFICANT CHANGE UP (ref 5–17)
BUN SERPL-MCNC: 28 MG/DL — HIGH (ref 7–23)
CALCIUM SERPL-MCNC: 9.1 MG/DL — SIGNIFICANT CHANGE UP (ref 8.4–10.5)
CHLORIDE SERPL-SCNC: 101 MMOL/L — SIGNIFICANT CHANGE UP (ref 96–108)
CO2 SERPL-SCNC: 26 MMOL/L — SIGNIFICANT CHANGE UP (ref 22–31)
CREAT SERPL-MCNC: 0.96 MG/DL — SIGNIFICANT CHANGE UP (ref 0.5–1.3)
GLUCOSE SERPL-MCNC: 119 MG/DL — HIGH (ref 70–99)
HCT VFR BLD CALC: 39.3 % — SIGNIFICANT CHANGE UP (ref 34.5–45)
HGB BLD-MCNC: 13.1 G/DL — SIGNIFICANT CHANGE UP (ref 11.5–15.5)
MCHC RBC-ENTMCNC: 30.4 PG — SIGNIFICANT CHANGE UP (ref 27–34)
MCHC RBC-ENTMCNC: 33.3 GM/DL — SIGNIFICANT CHANGE UP (ref 32–36)
MCV RBC AUTO: 91.2 FL — SIGNIFICANT CHANGE UP (ref 80–100)
PLATELET # BLD AUTO: 298 K/UL — SIGNIFICANT CHANGE UP (ref 150–400)
POTASSIUM SERPL-MCNC: 4 MMOL/L — SIGNIFICANT CHANGE UP (ref 3.5–5.3)
POTASSIUM SERPL-SCNC: 4 MMOL/L — SIGNIFICANT CHANGE UP (ref 3.5–5.3)
RBC # BLD: 4.31 M/UL — SIGNIFICANT CHANGE UP (ref 3.8–5.2)
RBC # FLD: 16 % — HIGH (ref 10.3–14.5)
SODIUM SERPL-SCNC: 141 MMOL/L — SIGNIFICANT CHANGE UP (ref 135–145)
WBC # BLD: 9.17 K/UL — SIGNIFICANT CHANGE UP (ref 3.8–10.5)
WBC # FLD AUTO: 9.17 K/UL — SIGNIFICANT CHANGE UP (ref 3.8–10.5)

## 2018-03-08 PROCEDURE — 93010 ELECTROCARDIOGRAM REPORT: CPT

## 2018-03-08 RX ORDER — FUROSEMIDE 40 MG
1 TABLET ORAL
Qty: 30 | Refills: 0
Start: 2018-03-08 | End: 2018-04-06

## 2018-03-08 RX ORDER — QUETIAPINE FUMARATE 200 MG/1
12.5 TABLET, FILM COATED ORAL EVERY 6 HOURS
Qty: 0 | Refills: 0 | Status: DISCONTINUED | OUTPATIENT
Start: 2018-03-08 | End: 2018-03-12

## 2018-03-08 RX ADMIN — FAMOTIDINE 20 MILLIGRAM(S): 10 INJECTION INTRAVENOUS at 12:48

## 2018-03-08 RX ADMIN — Medication 3 MILLILITER(S): at 21:47

## 2018-03-08 RX ADMIN — Medication 3 MILLILITER(S): at 05:31

## 2018-03-08 RX ADMIN — Medication 3 MILLILITER(S): at 17:05

## 2018-03-08 RX ADMIN — RISPERIDONE 1 MILLIGRAM(S): 4 TABLET ORAL at 18:56

## 2018-03-08 RX ADMIN — CETIRIZINE HYDROCHLORIDE 5 MILLIGRAM(S): 10 TABLET ORAL at 12:48

## 2018-03-08 RX ADMIN — Medication 20 MILLIGRAM(S): at 05:31

## 2018-03-08 RX ADMIN — HEPARIN SODIUM 5000 UNIT(S): 5000 INJECTION INTRAVENOUS; SUBCUTANEOUS at 13:02

## 2018-03-08 RX ADMIN — Medication 3 MILLILITER(S): at 12:48

## 2018-03-08 RX ADMIN — Medication 50 MICROGRAM(S): at 05:31

## 2018-03-08 RX ADMIN — HEPARIN SODIUM 5000 UNIT(S): 5000 INJECTION INTRAVENOUS; SUBCUTANEOUS at 21:46

## 2018-03-08 RX ADMIN — RISPERIDONE 1 MILLIGRAM(S): 4 TABLET ORAL at 05:33

## 2018-03-08 RX ADMIN — HEPARIN SODIUM 5000 UNIT(S): 5000 INJECTION INTRAVENOUS; SUBCUTANEOUS at 05:31

## 2018-03-08 NOTE — CONSULT NOTE ADULT - ASSESSMENT
Chronic paranoid schizophrenia and Alzheimer's dementia by history.  Oxygen desaturation when walking unlikely to have psychological etiology. Her Ativan (1mg) is low dose and unlikely to induce respiratory suppression.  Suspect early tardive dyskinesia from chronic neuroleptic exposure    Recommend  Check QTc and if under 500ms, can rx Seroquel 12.5 mg p.o. q6h prn anxiety. Avoid increasing the dose of Lorazepam given her respiratory status.   Continue with Risperdal 2mg qd (hold if hypotensive or QTc 500ms or greater)  If pt. still desaturating, consider taper of Lorazepam to 0.50mg p.o. qhs (do not abruptly d/c it as she can undergo withdrawal reaction).   Pulmonary followup.  Will follow with you here.    Haresh Quiroz M.D.  Psychiatry  [phone]
Unclear etiology for tachypniec epiosode: No evidence of bronchspasm, pneumonia, or PE with essentially normal CT chest  Today patient is afebrile with a clear lung exam  No evidence of residual viral tracheobronchitis or mucous plugging  PBNP normal    REC    Observe off abx  No need for steroids/bronchodilators  DC planning primary team
68yr old female with advanced Alzheimer's dementia, paranoid schizophrenia, hypothyroidism, PVD, who was sent from [facility] for tachypnea and hypoxia    1. Hypoxia   likely from viral illness   + RSV   chest xray revealing no focal consolidation   CTA chest neg for PE   no evidence of decomp CHF on exam   can check Echo to eval LVEF   pending BC/ UC  results   pulm f/u     2. cv stable   no chest pain or sob.  EKG no evidence of acute ischemia/ACS  no evidence of decomp CHF on exam   check Echo     dvt ppx

## 2018-03-08 NOTE — CONSULT NOTE ADULT - SUBJECTIVE AND OBJECTIVE BOX
[facility]), [facility]        Patient is a 68y old  Female who presents with a chief complaint of Tachypnea/hypoxia (06 Mar 2018 14:23)      HPI:  * pt with advanced dementia, poor historian - history obtained from chart.     This is a 68yr old female with advanced Alzheimer's dementia, paranoid schizophrenia, hypothyroidism, PVD, who was sent from [facility] for tachypnea and hypoxia. No documented SpO2 from transfer note. On arrival, pt noted to be breathing at 38-40s, tachycardic to 100s, mild hypoxia 90% on RA. Pt herself denies any symptoms - denies fever/chills, chest pain, sob, abdominal pain, palpitation, n/v/c/d. Of note, pt was recently hospitalized - for RSV bronchitis s/p steroid treatment.     In ED  Vitals: -145/79-96, HR , RR 30-50, Tm 98.5, SpO2 90% on RA -> 94% on 2L NC  Meds: duoneb x3, solumedrol 125 (2018 16:59)           *****PAST MEDICAL / Surgical  HISTORY:  PAST MEDICAL & SURGICAL HISTORY:  Hypothyroid  Upper GI bleed  Paranoid schizophrenia  UTI (urinary tract infection)  Alzheimer's dementia  No significant past surgical history           *****FAMILY HISTORY:  FAMILY HISTORY:  No pertinent family history in first degree relatives           *****SOCIAL HISTORY:  Alcohol: None  Smoking: None         *****ALLERGIES:   Allergies    No Known Allergies    Intolerances             *****MEDICATIONS: current medication reviewed and documented.   MEDICATIONS  (STANDING):  ALBUTerol/ipratropium for Nebulization 3 milliLiter(s) Nebulizer every 6 hours  cetirizine 5 milliGRAM(s) Oral daily  famotidine    Tablet 20 milliGRAM(s) Oral daily  furosemide    Tablet 20 milliGRAM(s) Oral daily  heparin  Injectable 5000 Unit(s) SubCutaneous every 8 hours  levoFLOXacin  Tablet 500 milliGRAM(s) Oral every 24 hours  levothyroxine 50 MICROGram(s) Oral daily  risperiDONE   Solution 1 milliGRAM(s) Oral two times a day    MEDICATIONS  (PRN):           *****REVIEW OF SYSTEM:  unable to obtain due to dementia.          *****VITAL SIGNS:  T(C): 36.6 (18 @ 04:05), Max: 36.6 (18 @ 04:05)  HR: 72 (18 @ 04:05) (72 - 92)  BP: 97/63 (18 @ 04:05) (97/63 - 105/71)  RR: 18 (18 @ 04:05) (18 - 19)  SpO2: 94% (18 @ 04:05) (84% - 95%)  Wt(kg): --     @ 07:01  -   @ 07:00  --------------------------------------------------------  IN: 840 mL / OUT: 0 mL / NET: 840 mL             *****PHYSICAL EXAM:     Alert oriented x 3   Attention comprehension are fair. Able to name, repeat, read without any difficulty.   Able to follow 3 step commands.     EOMI fundi not visualized,  VFF to confrontration  No facial asymmetry   Tongue is midline   Palate elevates symmetrically   Moving all 4 ext symmetrically no pronator drift.  Reflexes are symmetric throughout   sensation is grossly symmetric  Gait : not assessed.  B/L down going toes        >>> <<<         *****LAB AND IMAGIN.1   9.17  )-----------( 298      ( 08 Mar 2018 07:35 )             39.3               03-08    141  |  101  |  28<H>  ----------------------------<  119<H>  4.0   |  26  |  0.96    Ca    9.1      08 Mar 2018 06:14                                  [All pertinent recent Imaging reports reviewed]         *****A S S E S S M E N T   A N D   P L A N :            80 minutes spent on the total encounter;  more than 50 % of the visit was spent on counseling  and or coordinating care by the attending physician.    Thank you for allowing me to participate in the care of this pelon patient. Please do not hesitate to call me if you have any questions.   ___________________________  Will follow with you.  Thank you,  Ly Ortega MD  Diplomate of the American Board of Neurology and Psychiatry.  Diplomate of the American Board of Vascular Neurology.   [facility] ([facility]), [facility]   Ph: [phone]      This and subsequent notes were partially created using voice recognition software and will  inherently be subject to errors including those of syntax and sound alike substitutions which may escape proofreading. In such instances original meaning may be extrapolated by contextual derivation. [facility]), [facility]        Patient is a 68y old  Female who presents with a chief complaint of Tachypnea/hypoxia (06 Mar 2018 14:23)      HPI:  * pt with advanced dementia, poor historian - history obtained from chart.     This is a 68yr old female with advanced Alzheimer's dementia, paranoid schizophrenia, hypothyroidism, PVD, who was sent from [facility] for tachypnea and hypoxia. No documented SpO2 from transfer note. On arrival, pt noted to be breathing at 38-40s, tachycardic to 100s, mild hypoxia 90% on RA. Pt herself denies any symptoms - denies fever/chills, chest pain, sob, abdominal pain, palpitation, n/v/c/d. Of note, pt was recently hospitalized - for RSV bronchitis s/p steroid treatment.     In ED  Vitals: -145/79-96, HR , RR 30-50, Tm 98.5, SpO2 90% on RA -> 94% on 2L NC  Meds: duoneb x3, solumedrol 125 (2018 16:59)           *****PAST MEDICAL / Surgical  HISTORY:  PAST MEDICAL & SURGICAL HISTORY:  Hypothyroid  Upper GI bleed  Paranoid schizophrenia  UTI (urinary tract infection)  Alzheimer's dementia  No significant past surgical history           *****FAMILY HISTORY:  FAMILY HISTORY:  No pertinent family history in first degree relatives           *****SOCIAL HISTORY:  Alcohol: None  Smoking: None         *****ALLERGIES:   Allergies    No Known Allergies    Intolerances             *****MEDICATIONS: current medication reviewed and documented.   MEDICATIONS  (STANDING):  ALBUTerol/ipratropium for Nebulization 3 milliLiter(s) Nebulizer every 6 hours  cetirizine 5 milliGRAM(s) Oral daily  famotidine    Tablet 20 milliGRAM(s) Oral daily  furosemide    Tablet 20 milliGRAM(s) Oral daily  heparin  Injectable 5000 Unit(s) SubCutaneous every 8 hours  levoFLOXacin  Tablet 500 milliGRAM(s) Oral every 24 hours  levothyroxine 50 MICROGram(s) Oral daily  risperiDONE   Solution 1 milliGRAM(s) Oral two times a day    MEDICATIONS  (PRN):           *****REVIEW OF SYSTEM:  unable to obtain due to dementia.          *****VITAL SIGNS:  T(C): 36.6 (18 @ 04:05), Max: 36.6 (18 @ 04:05)  HR: 72 (18 @ 04:05) (72 - 92)  BP: 97/63 (18 @ 04:05) (97/63 - 105/71)  RR: 18 (18 @ 04:05) (18 - 19)  SpO2: 94% (18 @ 04:05) (84% - 95%)  Wt(kg): --     @ 07:01  -   @ 07:00  --------------------------------------------------------  IN: 840 mL / OUT: 0 mL / NET: 840 mL             *****PHYSICAL EXAM:      Alert oriented x 2 did not know the date  Attention comprehension are fair. Able to name, repeat, read without any difficulty.   know current events.      EOMI fundi not visualized,    No facial asymmetry   Tongue is midline   Palate elevates symmetrically   Moving all 4 ext symmetrically no pronator drift.  Reflexes are symmetric throughout   sensation is grossly symmetric  Gait : not assessed.  B/L down going toes        >>> <<<         *****LAB AND IMAGIN.1   9.17  )-----------( 298      ( 08 Mar 2018 07:35 )             39.3               03-08    141  |  101  |  28<H>  ----------------------------<  119<H>  4.0   |  26  |  0.96    Ca    9.1      08 Mar 2018 06:14                                  [All pertinent recent Imaging reports reviewed]         *****A S S E S S M E N T   A N D   P L A N :      * pt with advanced dementia, poor historian - history obtained from chart.     This is a 68yr old female with advanced Alzheimer's dementia, paranoid schizophrenia, hypothyroidism, PVD, who was sent from [facility] for tachypnea and hypoxia. No documented SpO2 from transfer note. On arrival, pt noted to be breathing at 38-40s, tachycardic to 100s, mild hypoxia 90% on RA. Pt herself denies any symptoms - denies fever/chills, chest pain, sob, abdominal pain, palpitation, n/v/c/d. Of note, pt was recently hospitalized - for RSV bronchitis s/p steroid treatment.   acute worsening of mental status likely multifactorial, due to infection, hypoxia, steroid rx, hospitalization and sleep deprivation.   would regulate sleep cycle   avoid day time sleepiness.   would get psychiatry for behavior management.       80 minutes spent on the total encounter;  more than 50 % of the visit was spent on counseling  and or coordinating care by the attending physician.    Thank you for allowing me to participate in the care of this pelon patient. Please do not hesitate to call me if you have any questions.   ___________________________  Will follow with you.  Thank you,  Ly Ortega MD  Diplomate of the American Board of Neurology and Psychiatry.  Diplomate of the American Board of Vascular Neurology.   [facility] ([facility]), [facility]   Ph: [phone]      This and subsequent notes were partially created using voice recognition software and will  inherently be subject to errors including those of syntax and sound alike substitutions which may escape proofreading. In such instances original meaning may be extrapolated by contextual derivation.

## 2018-03-08 NOTE — PROVIDER CONTACT NOTE (OTHER) - ASSESSMENT
Approximately 4 minutes into ambulation pt pulse ox sat 89%. Increased respiration (25-35), barking wheezing expirations breath sounds and attempting to sit down. pt stating "I have to sit down"

## 2018-03-08 NOTE — CONSULT NOTE ADULT - SUBJECTIVE AND OBJECTIVE BOX
Chart reviewed and patient seen by undersigned    Asked to consult for anxiety.    Historians include chart, the pt., and Dr. Modi    History of Present Illness  The patient is a 68 year old  female with a history of Alzheimer's dementia and chronic paranoid schizophrenia. She was sent here from her assisted living facility for tachypnea and hypoxia. Found to have sepsis from  RSV bronchiolitis. When walking she was found here to desat to 84%. The pt. denies stressors. Dr. Modi feels the oxygen desaturation is not from RSV. PTA she was on a prednisone taper.       Past Psychiatric History  Alzheimer's dementia and chronic paranoid schizophrenia. She takes at home Risperdal liquid 2mg/day and Ativan 1mg qhs.      Psychosocial History  Lives in [facility]. No substance abuse history per their records. She says she is  and has no children.    PAST MEDICAL & SURGICAL HISTORY:  Hypothyroid  Upper GI bleed  Paranoid schizophrenia  UTI (urinary tract infection)  Alzheimer's dementia  No significant past surgical history      FAMILY HISTORY:  No pertinent family history in first degree relatives      Vital Signs Last 24 Hrs  T(C): 36.6 (08 Mar 2018 04:05), Max: 36.6 (08 Mar 2018 04:05)  T(F): 97.9 (08 Mar 2018 04:05), Max: 97.9 (08 Mar 2018 04:05)  HR: 72 (08 Mar 2018 04:05) (72 - 72)  BP: 97/63 (08 Mar 2018 04:05) (97/63 - 97/63)  BP(mean): --  RR: 18 (08 Mar 2018 04:05) (18 - 18)  SpO2: 94% (08 Mar 2018 04:05) (94% - 94%)                          13.1   9.17  )-----------( 298      ( 08 Mar 2018 07:35 )             39.3       03-08    141  |  101  |  28<H>  ----------------------------<  119<H>  4.0   |  26  |  0.96    Ca    9.1      08 Mar 2018 06:14      QTc 437ms        MEDICATIONS  (STANDING):  ALBUTerol/ipratropium for Nebulization 3 milliLiter(s) Nebulizer every 6 hours  cetirizine 5 milliGRAM(s) Oral daily  famotidine    Tablet 20 milliGRAM(s) Oral daily  furosemide    Tablet 20 milliGRAM(s) Oral daily  heparin  Injectable 5000 Unit(s) SubCutaneous every 8 hours  levoFLOXacin  Tablet 500 milliGRAM(s) Oral every 24 hours  levothyroxine 50 MICROGram(s) Oral daily  risperiDONE   Solution 1 milliGRAM(s) Oral two times a day    MEDICATIONS  (PRN):      Elderly  female in bed, sitting up, finished eating her dinner,  calm, cooperative, alert and oriented x 2-3: "February" . Some tongue fasciculations and tachypnea noted. Insight and judgment are fair. Speech is dysarthric, answers most questions with Yes and No responses but coherent with normal rate and low volume. No hallucinations nor delusions. The patient denied suicidal and homicidal ideation and plan. Mood is euthymic and affect constricted/flat. Attention and concentration fair with decreased short term memory. Long term memory within normal limits.     Suicidal risk assessment  Risk factors include having dementia and schizophrenia  Protective factors include no plan, no past attempts (none documented in notes from her assisted living facility), no substance abuse, good social supports.

## 2018-03-08 NOTE — PROGRESS NOTE ADULT - ASSESSMENT
68yr old female with advanced Alzheimer's dementia, paranoid schizophrenia, hypothyroidism, PVD, who was sent from [facility] for tachypnea and hypoxia    1. Hypoxia   likely from viral illness   + RSV   chest xray revealing no focal consolidation   CTA chest neg for PE   cont low dose lasix  echo revealing normal LV sys fx , mod diastolic dysfx   pulm f/u     2. cv stable   no chest pain or sob.  EKG no evidence of acute ischemia/ACS  echo revealing normal LV sys fx , mod diastolic dysfx     dvt ppx

## 2018-03-08 NOTE — PROGRESS NOTE ADULT - ASSESSMENT
This is a 68yr old female with advanced Alzheimer's dementia, paranoid schizophrenia, hypothyroidism, PVD, who was sent from [facility] for tachypnea and hypoxia     Problem/Plan - 1:  ·  Problem: Sepsis due to RSV broncholitis.   Resolved.  -     Problem/Plan - 2:  ·  Problem: Tachypnea.  Plan: Pt with tachypnea 30-50s, also with tachycardia and hypoxia. No calf tenderness. Wells score at least 3.     - if negative, likely 2/2 anxiety.      Problem/Plan - 3:  ·  Problem: RSV bronchitis.  Plan: Duoneb. Pul f/up noted.     Problem/Plan - 4:  ·  Problem: Acute respiratory failure with hypoxia.Improving Plan: Pt sent in for hypoxia, requiring NC O2. Unclear etiology at this time - 2/2 RSV bronchitis vs. PE vs. superimposed PNA  - Pul f/up noted.  - pt desaturated to 84% on ra after ambulating     Problem/Plan - 5:  ·  Problem: Paranoid schizophrenia. Plan: stable  - continue risperidone and ativan.     Problem/Plan - 6:  Problem: Hypothyroidism, unspecified type. Plan: continue synthroid.     Problem/Plan - 7:  ·  Problem: Alzheimer's dementia. Plan: at baseline.   - frequent orientation.      walk test cut short when pt dropped sats to 89% and became tachypneic and weak and had to be brought back to bed.  pt had walk test yesterday with desat to 84 % on RA.    psych eval  pulm f/u  continue current care

## 2018-03-08 NOTE — PROGRESS NOTE ADULT - ASSESSMENT
Schizophrenia with anxiety  No clinical suspicion of pulmonary infection  Diastolic CHF: intermittent tachynpnea may be partially related  Dementia    REC    Contunue nebulizers  No need for abx for lung: Levaquin for ? UTI  Re Check RA sats -no need for O2  Continue low dose lasix per cardiology Schizophrenia with anxiety  No clinical suspicion of pulmonary infection  Diastolic CHF: intermittent tachynpnea may be partially related  Dementia    REC    Contunue nebulizers  No need for abx for lung: Levaquin for ? UTI  Re Check RA sats -no need for O2 unless < 88%  Continue low dose lasix per cardiology

## 2018-03-09 LAB
ANION GAP SERPL CALC-SCNC: 15 MMOL/L — SIGNIFICANT CHANGE UP (ref 5–17)
BUN SERPL-MCNC: 20 MG/DL — SIGNIFICANT CHANGE UP (ref 7–23)
CALCIUM SERPL-MCNC: 9.4 MG/DL — SIGNIFICANT CHANGE UP (ref 8.4–10.5)
CHLORIDE SERPL-SCNC: 101 MMOL/L — SIGNIFICANT CHANGE UP (ref 96–108)
CO2 SERPL-SCNC: 24 MMOL/L — SIGNIFICANT CHANGE UP (ref 22–31)
CREAT SERPL-MCNC: 0.96 MG/DL — SIGNIFICANT CHANGE UP (ref 0.5–1.3)
GLUCOSE SERPL-MCNC: 119 MG/DL — HIGH (ref 70–99)
HCT VFR BLD CALC: 40.3 % — SIGNIFICANT CHANGE UP (ref 34.5–45)
HGB BLD-MCNC: 13.4 G/DL — SIGNIFICANT CHANGE UP (ref 11.5–15.5)
MCHC RBC-ENTMCNC: 30.9 PG — SIGNIFICANT CHANGE UP (ref 27–34)
MCHC RBC-ENTMCNC: 33.3 GM/DL — SIGNIFICANT CHANGE UP (ref 32–36)
MCV RBC AUTO: 92.9 FL — SIGNIFICANT CHANGE UP (ref 80–100)
PLATELET # BLD AUTO: 284 K/UL — SIGNIFICANT CHANGE UP (ref 150–400)
POTASSIUM SERPL-MCNC: 3.9 MMOL/L — SIGNIFICANT CHANGE UP (ref 3.5–5.3)
POTASSIUM SERPL-SCNC: 3.9 MMOL/L — SIGNIFICANT CHANGE UP (ref 3.5–5.3)
RBC # BLD: 4.34 M/UL — SIGNIFICANT CHANGE UP (ref 3.8–5.2)
RBC # FLD: 15.8 % — HIGH (ref 10.3–14.5)
SODIUM SERPL-SCNC: 140 MMOL/L — SIGNIFICANT CHANGE UP (ref 135–145)
WBC # BLD: 8.65 K/UL — SIGNIFICANT CHANGE UP (ref 3.8–10.5)
WBC # FLD AUTO: 8.65 K/UL — SIGNIFICANT CHANGE UP (ref 3.8–10.5)

## 2018-03-09 RX ADMIN — Medication 3 MILLILITER(S): at 12:12

## 2018-03-09 RX ADMIN — HEPARIN SODIUM 5000 UNIT(S): 5000 INJECTION INTRAVENOUS; SUBCUTANEOUS at 21:16

## 2018-03-09 RX ADMIN — RISPERIDONE 1 MILLIGRAM(S): 4 TABLET ORAL at 21:16

## 2018-03-09 RX ADMIN — QUETIAPINE FUMARATE 12.5 MILLIGRAM(S): 200 TABLET, FILM COATED ORAL at 06:29

## 2018-03-09 RX ADMIN — CETIRIZINE HYDROCHLORIDE 5 MILLIGRAM(S): 10 TABLET ORAL at 12:12

## 2018-03-09 RX ADMIN — Medication 50 MICROGRAM(S): at 05:35

## 2018-03-09 RX ADMIN — Medication 3 MILLILITER(S): at 18:00

## 2018-03-09 RX ADMIN — RISPERIDONE 1 MILLIGRAM(S): 4 TABLET ORAL at 05:35

## 2018-03-09 RX ADMIN — FAMOTIDINE 20 MILLIGRAM(S): 10 INJECTION INTRAVENOUS at 12:12

## 2018-03-09 RX ADMIN — HEPARIN SODIUM 5000 UNIT(S): 5000 INJECTION INTRAVENOUS; SUBCUTANEOUS at 13:15

## 2018-03-09 RX ADMIN — Medication 20 MILLIGRAM(S): at 05:35

## 2018-03-09 RX ADMIN — HEPARIN SODIUM 5000 UNIT(S): 5000 INJECTION INTRAVENOUS; SUBCUTANEOUS at 07:38

## 2018-03-09 RX ADMIN — Medication 3 MILLILITER(S): at 05:35

## 2018-03-09 NOTE — PROVIDER CONTACT NOTE (OTHER) - ACTION/TREATMENT ORDERED:
NP Sunni assessed pt at bedside, Xanax 0.5mg ordered and given, pt appears calmer, will continue to monitor pt
STAT Duoneb, after neb complete place pt back on RA to reassess.
continue to monition pulse ox, o2, medication adjustment
continue to monition pulse ox, o2, medication adjustment. possible Pysch F/U

## 2018-03-09 NOTE — PROGRESS NOTE ADULT - ASSESSMENT
68yr old female with advanced Alzheimer's dementia, paranoid schizophrenia, hypothyroidism, PVD, who was sent from [facility] for tachypnea and hypoxia    1. Hypoxia   likely from viral illness  + RSV   chest xray revealing no focal consolidation   CTA chest neg for PE   events from earlier noted, likely anxiety driven   no CHF on exam   cont low dose lasix  echo revealing normal LV sys fx , mod diastolic dysfx   pulm f/u     2. cv stable   no chest pain or sob.  EKG no evidence of acute ischemia/ACS  echo revealing normal LV sys fx , mod diastolic dysfx     dvt ppx

## 2018-03-09 NOTE — PROVIDER CONTACT NOTE (OTHER) - ASSESSMENT
Pt has episodic tachypnea of unclear etiology, lung sounds clear. Other than tachypnea, pt shows no signs of distress.

## 2018-03-09 NOTE — PROGRESS NOTE ADULT - ASSESSMENT
This is a 68yr old female with advanced Alzheimer's dementia, paranoid schizophrenia, hypothyroidism, PVD, who was sent from [facility] for tachypnea and hypoxia     Problem/Plan - 1:  ·  Problem: Sepsis due to RSV broncholitis.   Resolved.  -     Problem/Plan - 2:  ·  Problem: Tachypnea.  Plan: Pt with tachypnea 30-50s, also with tachycardia and hypoxia. No calf tenderness. Wells score at least 3.         Problem/Plan - 3:  ·  Problem: RSV bronchitis.  Plan: Duoneb. Pul f/up noted.     Problem/Plan - 4:  ·  Problem: Acute respiratory failure with hypoxia.Improving Plan: Pt sent in for hypoxia, requiring NC O2. Unclear etiology at this time - 2/2 RSV bronchitis vs. PE vs. superimposed PNA  - Pul f/up noted.  - pt desaturated to 84% on ra after ambulating     Problem/Plan - 5:  ·  Problem: Paranoid schizophrenia. Plan: stable  - continue risperidone and ativan.     Problem/Plan - 6:  Problem: Hypothyroidism, unspecified type. Plan: continue synthroid.     Problem/Plan - 7:  ·  Problem: Alzheimer's dementia. Plan: at baseline.   - frequent orientation.    continue current care  overnight hypoxia event noted  appreciate pulm input, who wishes to continue to monitor pt over weekend in house.

## 2018-03-09 NOTE — PROVIDER CONTACT NOTE (OTHER) - BACKGROUND
Pt admitted for hypoxia of unclear etiology. RSV (+), CXR clear with low lung volume, CTA (-) PE. Pt has hx of paranoid schizophrenia and Alzheimer's Dementia. As per psych, hypoxia unlikely d/t psych

## 2018-03-09 NOTE — PROGRESS NOTE ADULT - ASSESSMENT
Alzheimer dementia. Chronic paranoid schizophrenia.   Calmer with prn Seroquel.     Recommend  Continue current rx.   Follow QTc.      Haresh Quiroz M.D.  Psychiatry  [phone]

## 2018-03-09 NOTE — PROGRESS NOTE ADULT - ASSESSMENT
Schizophrenia with anxiety  No clinical suspicion of pulmonary infection  Diastolic CHF: intermittent tachynpnea may be partially related  Dementia  Intermittent desaturation: suspect related to atelectasis associated with periods of anxiety induce rapid shallow breathing. Better mobilization will likely eliminate need for O2 during ambulation.    REC    Contunue nebulizers  No need for abx for lung: Levaquin for ? UTI  Monitor O2 sats OFF oxygen; encourage mobilization and re check sats ambulating  Would monitor as inpatient - poor candidate for O2 for ambulation

## 2018-03-09 NOTE — CHART NOTE - NSCHARTNOTEFT_GEN_A_CORE
MEDICINE PA     Notified by RN that patient hypoxic to 87% and tachypneic. Ordered Duobnebs, upon arrival to room- patient asleep and resting comfortably with RR 18-20. Was placed on 5L NC by RN. Decreased to 2L with saturation maintaining 90-96%. Down titrate oxygen as tolerated. Work up per clinical course. Endorsed to primary team in AM. Attending to follow.     Delilah Leslie PA-C   Department of Medicine MEDICINE PA     Notified by RN that patient hypoxic to 87% and tachypneic. Ordered Duobnebs, upon arrival to room- patient asleep and resting comfortably with RR 18-20. Was placed on 5L NC by RN. Decreased to 2L with saturation maintaining 90-96%. Down titrate oxygen as tolerated. Work up per clinical course. Endorsed to primary team in AM. Attending to follow.     Delilah Leslie PA-C   Department of Medicine  *Late entry note* MEDICINE PA     Notified by RN that patient hypoxic to 87% and tachypneic. Ordered Duobnebs, upon arrival to room- patient asleep and resting comfortably with RR 18-20. Was placed on 5L NC by RN. Decreased to 2L with saturation maintaining 90-96%. Was ambulating to/from bathroom prior to episode. Down titrate oxygen as tolerated. Work up per clinical course. If continued hypoxia consider repeat CXR, ABG. Will continue to follow. Endorsed to primary team in AM. Attending to follow.     JUSTINE Avery-C   Department of Medicine  *Late entry note*

## 2018-03-10 RX ADMIN — FAMOTIDINE 20 MILLIGRAM(S): 10 INJECTION INTRAVENOUS at 12:02

## 2018-03-10 RX ADMIN — Medication 3 MILLILITER(S): at 17:29

## 2018-03-10 RX ADMIN — RISPERIDONE 1 MILLIGRAM(S): 4 TABLET ORAL at 05:10

## 2018-03-10 RX ADMIN — HEPARIN SODIUM 5000 UNIT(S): 5000 INJECTION INTRAVENOUS; SUBCUTANEOUS at 13:15

## 2018-03-10 RX ADMIN — RISPERIDONE 1 MILLIGRAM(S): 4 TABLET ORAL at 17:29

## 2018-03-10 RX ADMIN — Medication 20 MILLIGRAM(S): at 05:10

## 2018-03-10 RX ADMIN — Medication 3 MILLILITER(S): at 05:10

## 2018-03-10 RX ADMIN — Medication 3 MILLILITER(S): at 12:02

## 2018-03-10 RX ADMIN — HEPARIN SODIUM 5000 UNIT(S): 5000 INJECTION INTRAVENOUS; SUBCUTANEOUS at 21:24

## 2018-03-10 RX ADMIN — CETIRIZINE HYDROCHLORIDE 5 MILLIGRAM(S): 10 TABLET ORAL at 12:01

## 2018-03-10 RX ADMIN — Medication 50 MICROGRAM(S): at 05:10

## 2018-03-10 RX ADMIN — HEPARIN SODIUM 5000 UNIT(S): 5000 INJECTION INTRAVENOUS; SUBCUTANEOUS at 05:11

## 2018-03-10 NOTE — PROGRESS NOTE ADULT - ASSESSMENT
68yr old female with advanced Alzheimer's dementia, paranoid schizophrenia, hypothyroidism, PVD, who was sent from [facility] for tachypnea and hypoxia    1. Hypoxia   likely from viral illness  + RSV   chest xray revealing no focal consolidation   CTA chest neg for PE   no CHF on exam   cont low dose lasix  echo revealing normal LV sys fx , mod diastolic dysfx   pulm f/u     2. cv stable   no chest pain or sob.  EKG no evidence of acute ischemia/ACS  echo revealing normal LV sys fx , mod diastolic dysfx     dvt ppx

## 2018-03-10 NOTE — PROGRESS NOTE ADULT - ASSESSMENT
This is a 68yr old female with advanced Alzheimer's dementia, paranoid schizophrenia, hypothyroidism, PVD, who was sent from [facility] for tachypnea and hypoxia     Problem/Plan - 1:  ·  Problem: Sepsis due to RSV broncholitis.   Resolved.  -     Problem/Plan - 2:  ·  Problem: Tachypnea.  Plan: Pt with tachypnea 30-50s, also with tachycardia and hypoxia. No calf tenderness. Wells score at least 3.         Problem/Plan - 3:  ·  Problem: RSV bronchitis.  Plan: Duoneb. Pul f/up noted.     Problem/Plan - 4:  ·  Problem: Acute respiratory failure with hypoxia.Improving Plan: Pt sent in for hypoxia, requiring NC O2. Unclear etiology at this time - 2/2 RSV bronchitis vs. PE vs. superimposed PNA  - Pul f/up noted.  - pt desaturated to 84% on ra after ambulating     Problem/Plan - 5:  ·  Problem: Paranoid schizophrenia. Plan: stable  - continue risperidone and ativan.     Problem/Plan - 6:  Problem: Hypothyroidism, unspecified type. Plan: continue synthroid.     Problem/Plan - 7:  ·  Problem: Alzheimer's dementia. Plan: at baseline.   - frequent orientation.    continue current care  appreciate pulm input, who wishes to continue to monitor pt over weekend in house.  observe sats off oxygen  encourage deep breathing

## 2018-03-10 NOTE — PROGRESS NOTE ADULT - ASSESSMENT
Schizophrenia with anxiety  No clinical suspicion of pulmonary infection  Diastolic CHF: intermittent tachynpnea may be partially related  Dementia  Intermittent desaturation: suspect related to atelectasis associated with periods of anxiety induce rapid shallow breathing. Better mobilization will likely eliminate need for O2 during ambulation.    REC    Contunue nebulizers  Observe off abx  Monitor O2 sats OFF oxygen; encourage mobilization and re check sats ambulating  Would monitor as inpatient - poor candidate for O2 for ambulation

## 2018-03-11 LAB
ANION GAP SERPL CALC-SCNC: 13 MMOL/L — SIGNIFICANT CHANGE UP (ref 5–17)
BUN SERPL-MCNC: 24 MG/DL — HIGH (ref 7–23)
CALCIUM SERPL-MCNC: 9.1 MG/DL — SIGNIFICANT CHANGE UP (ref 8.4–10.5)
CHLORIDE SERPL-SCNC: 103 MMOL/L — SIGNIFICANT CHANGE UP (ref 96–108)
CO2 SERPL-SCNC: 24 MMOL/L — SIGNIFICANT CHANGE UP (ref 22–31)
CREAT SERPL-MCNC: 1.12 MG/DL — SIGNIFICANT CHANGE UP (ref 0.5–1.3)
GLUCOSE SERPL-MCNC: 124 MG/DL — HIGH (ref 70–99)
HCT VFR BLD CALC: 38.2 % — SIGNIFICANT CHANGE UP (ref 34.5–45)
HGB BLD-MCNC: 12.1 G/DL — SIGNIFICANT CHANGE UP (ref 11.5–15.5)
MCHC RBC-ENTMCNC: 28.9 PG — SIGNIFICANT CHANGE UP (ref 27–34)
MCHC RBC-ENTMCNC: 31.7 GM/DL — LOW (ref 32–36)
MCV RBC AUTO: 91.4 FL — SIGNIFICANT CHANGE UP (ref 80–100)
PLATELET # BLD AUTO: 264 K/UL — SIGNIFICANT CHANGE UP (ref 150–400)
POTASSIUM SERPL-MCNC: 3.9 MMOL/L — SIGNIFICANT CHANGE UP (ref 3.5–5.3)
POTASSIUM SERPL-SCNC: 3.9 MMOL/L — SIGNIFICANT CHANGE UP (ref 3.5–5.3)
RBC # BLD: 4.18 M/UL — SIGNIFICANT CHANGE UP (ref 3.8–5.2)
RBC # FLD: 15.5 % — HIGH (ref 10.3–14.5)
SODIUM SERPL-SCNC: 140 MMOL/L — SIGNIFICANT CHANGE UP (ref 135–145)
WBC # BLD: 8.38 K/UL — SIGNIFICANT CHANGE UP (ref 3.8–10.5)
WBC # FLD AUTO: 8.38 K/UL — SIGNIFICANT CHANGE UP (ref 3.8–10.5)

## 2018-03-11 RX ADMIN — Medication 3 MILLILITER(S): at 11:25

## 2018-03-11 RX ADMIN — FAMOTIDINE 20 MILLIGRAM(S): 10 INJECTION INTRAVENOUS at 11:25

## 2018-03-11 RX ADMIN — Medication 3 MILLILITER(S): at 23:14

## 2018-03-11 RX ADMIN — RISPERIDONE 1 MILLIGRAM(S): 4 TABLET ORAL at 18:10

## 2018-03-11 RX ADMIN — Medication 20 MILLIGRAM(S): at 05:21

## 2018-03-11 RX ADMIN — Medication 3 MILLILITER(S): at 05:20

## 2018-03-11 RX ADMIN — RISPERIDONE 1 MILLIGRAM(S): 4 TABLET ORAL at 05:20

## 2018-03-11 RX ADMIN — Medication 3 MILLILITER(S): at 18:10

## 2018-03-11 RX ADMIN — CETIRIZINE HYDROCHLORIDE 5 MILLIGRAM(S): 10 TABLET ORAL at 11:25

## 2018-03-11 RX ADMIN — Medication 50 MICROGRAM(S): at 05:21

## 2018-03-11 RX ADMIN — Medication 3 MILLILITER(S): at 00:25

## 2018-03-11 RX ADMIN — HEPARIN SODIUM 5000 UNIT(S): 5000 INJECTION INTRAVENOUS; SUBCUTANEOUS at 14:30

## 2018-03-11 RX ADMIN — HEPARIN SODIUM 5000 UNIT(S): 5000 INJECTION INTRAVENOUS; SUBCUTANEOUS at 21:06

## 2018-03-11 RX ADMIN — HEPARIN SODIUM 5000 UNIT(S): 5000 INJECTION INTRAVENOUS; SUBCUTANEOUS at 05:21

## 2018-03-11 NOTE — PROGRESS NOTE ADULT - ASSESSMENT
68yr old female with advanced Alzheimer's dementia, paranoid schizophrenia, hypothyroidism, PVD, who was sent from [facility] for tachypnea and hypoxia    1. Hypoxia   likely from viral illness  + RSV   chest xray revealing no focal consolidation   CTA chest neg for PE   no CHF on exam   cont low dose lasix  echo revealing normal LV sys fx , mod diastolic dysfx   pulm f/u     2. cv stable   no chest pain or sob.  EKG no evidence of acute ischemia/ACS  echo revealing normal LV sys fx , mod diastolic dysfx     dvt ppx

## 2018-03-12 VITALS
SYSTOLIC BLOOD PRESSURE: 118 MMHG | HEART RATE: 88 BPM | TEMPERATURE: 98 F | OXYGEN SATURATION: 94 % | RESPIRATION RATE: 20 BRPM | DIASTOLIC BLOOD PRESSURE: 78 MMHG

## 2018-03-12 LAB
ANION GAP SERPL CALC-SCNC: 12 MMOL/L — SIGNIFICANT CHANGE UP (ref 5–17)
BUN SERPL-MCNC: 21 MG/DL — SIGNIFICANT CHANGE UP (ref 7–23)
CALCIUM SERPL-MCNC: 8.9 MG/DL — SIGNIFICANT CHANGE UP (ref 8.4–10.5)
CHLORIDE SERPL-SCNC: 102 MMOL/L — SIGNIFICANT CHANGE UP (ref 96–108)
CO2 SERPL-SCNC: 25 MMOL/L — SIGNIFICANT CHANGE UP (ref 22–31)
CREAT SERPL-MCNC: 0.97 MG/DL — SIGNIFICANT CHANGE UP (ref 0.5–1.3)
GLUCOSE SERPL-MCNC: 125 MG/DL — HIGH (ref 70–99)
HCT VFR BLD CALC: 39.3 % — SIGNIFICANT CHANGE UP (ref 34.5–45)
HGB BLD-MCNC: 12.3 G/DL — SIGNIFICANT CHANGE UP (ref 11.5–15.5)
MCHC RBC-ENTMCNC: 28.7 PG — SIGNIFICANT CHANGE UP (ref 27–34)
MCHC RBC-ENTMCNC: 31.3 GM/DL — LOW (ref 32–36)
MCV RBC AUTO: 91.8 FL — SIGNIFICANT CHANGE UP (ref 80–100)
PLATELET # BLD AUTO: 257 K/UL — SIGNIFICANT CHANGE UP (ref 150–400)
POTASSIUM SERPL-MCNC: 3.9 MMOL/L — SIGNIFICANT CHANGE UP (ref 3.5–5.3)
POTASSIUM SERPL-SCNC: 3.9 MMOL/L — SIGNIFICANT CHANGE UP (ref 3.5–5.3)
RBC # BLD: 4.28 M/UL — SIGNIFICANT CHANGE UP (ref 3.8–5.2)
RBC # FLD: 15.5 % — HIGH (ref 10.3–14.5)
SODIUM SERPL-SCNC: 139 MMOL/L — SIGNIFICANT CHANGE UP (ref 135–145)
WBC # BLD: 8.61 K/UL — SIGNIFICANT CHANGE UP (ref 3.8–10.5)
WBC # FLD AUTO: 8.61 K/UL — SIGNIFICANT CHANGE UP (ref 3.8–10.5)

## 2018-03-12 PROCEDURE — 85610 PROTHROMBIN TIME: CPT

## 2018-03-12 PROCEDURE — 99285 EMERGENCY DEPT VISIT HI MDM: CPT | Mod: 25

## 2018-03-12 PROCEDURE — 87486 CHLMYD PNEUM DNA AMP PROBE: CPT

## 2018-03-12 PROCEDURE — 71275 CT ANGIOGRAPHY CHEST: CPT

## 2018-03-12 PROCEDURE — 83880 ASSAY OF NATRIURETIC PEPTIDE: CPT

## 2018-03-12 PROCEDURE — 87040 BLOOD CULTURE FOR BACTERIA: CPT

## 2018-03-12 PROCEDURE — 82553 CREATINE MB FRACTION: CPT

## 2018-03-12 PROCEDURE — 84132 ASSAY OF SERUM POTASSIUM: CPT

## 2018-03-12 PROCEDURE — 83605 ASSAY OF LACTIC ACID: CPT

## 2018-03-12 PROCEDURE — 82803 BLOOD GASES ANY COMBINATION: CPT

## 2018-03-12 PROCEDURE — 93005 ELECTROCARDIOGRAM TRACING: CPT

## 2018-03-12 PROCEDURE — 71045 X-RAY EXAM CHEST 1 VIEW: CPT

## 2018-03-12 PROCEDURE — 85730 THROMBOPLASTIN TIME PARTIAL: CPT

## 2018-03-12 PROCEDURE — 93306 TTE W/DOPPLER COMPLETE: CPT

## 2018-03-12 PROCEDURE — 97162 PT EVAL MOD COMPLEX 30 MIN: CPT

## 2018-03-12 PROCEDURE — 80053 COMPREHEN METABOLIC PANEL: CPT

## 2018-03-12 PROCEDURE — 96374 THER/PROPH/DIAG INJ IV PUSH: CPT | Mod: XU

## 2018-03-12 PROCEDURE — 87633 RESP VIRUS 12-25 TARGETS: CPT

## 2018-03-12 PROCEDURE — 85027 COMPLETE CBC AUTOMATED: CPT

## 2018-03-12 PROCEDURE — 94640 AIRWAY INHALATION TREATMENT: CPT

## 2018-03-12 PROCEDURE — 82435 ASSAY OF BLOOD CHLORIDE: CPT

## 2018-03-12 PROCEDURE — 82550 ASSAY OF CK (CPK): CPT

## 2018-03-12 PROCEDURE — 80048 BASIC METABOLIC PNL TOTAL CA: CPT

## 2018-03-12 PROCEDURE — 84295 ASSAY OF SERUM SODIUM: CPT

## 2018-03-12 PROCEDURE — 87798 DETECT AGENT NOS DNA AMP: CPT

## 2018-03-12 PROCEDURE — 85014 HEMATOCRIT: CPT

## 2018-03-12 PROCEDURE — 87186 SC STD MICRODIL/AGAR DIL: CPT

## 2018-03-12 PROCEDURE — 51701 INSERT BLADDER CATHETER: CPT

## 2018-03-12 PROCEDURE — 82962 GLUCOSE BLOOD TEST: CPT

## 2018-03-12 PROCEDURE — 87086 URINE CULTURE/COLONY COUNT: CPT

## 2018-03-12 PROCEDURE — 87581 M.PNEUMON DNA AMP PROBE: CPT

## 2018-03-12 PROCEDURE — 82330 ASSAY OF CALCIUM: CPT

## 2018-03-12 PROCEDURE — 80307 DRUG TEST PRSMV CHEM ANLYZR: CPT

## 2018-03-12 PROCEDURE — 82947 ASSAY GLUCOSE BLOOD QUANT: CPT

## 2018-03-12 PROCEDURE — 97116 GAIT TRAINING THERAPY: CPT

## 2018-03-12 PROCEDURE — 81001 URINALYSIS AUTO W/SCOPE: CPT

## 2018-03-12 PROCEDURE — 85379 FIBRIN DEGRADATION QUANT: CPT

## 2018-03-12 PROCEDURE — 97530 THERAPEUTIC ACTIVITIES: CPT

## 2018-03-12 PROCEDURE — 84484 ASSAY OF TROPONIN QUANT: CPT

## 2018-03-12 RX ORDER — QUETIAPINE FUMARATE 200 MG/1
12.5 TABLET, FILM COATED ORAL
Qty: 0 | Refills: 0 | DISCHARGE
Start: 2018-03-12

## 2018-03-12 RX ADMIN — FAMOTIDINE 20 MILLIGRAM(S): 10 INJECTION INTRAVENOUS at 12:28

## 2018-03-12 RX ADMIN — HEPARIN SODIUM 5000 UNIT(S): 5000 INJECTION INTRAVENOUS; SUBCUTANEOUS at 13:15

## 2018-03-12 RX ADMIN — RISPERIDONE 1 MILLIGRAM(S): 4 TABLET ORAL at 05:32

## 2018-03-12 RX ADMIN — HEPARIN SODIUM 5000 UNIT(S): 5000 INJECTION INTRAVENOUS; SUBCUTANEOUS at 05:32

## 2018-03-12 RX ADMIN — Medication 20 MILLIGRAM(S): at 05:32

## 2018-03-12 RX ADMIN — Medication 50 MICROGRAM(S): at 05:32

## 2018-03-12 RX ADMIN — QUETIAPINE FUMARATE 12.5 MILLIGRAM(S): 200 TABLET, FILM COATED ORAL at 07:49

## 2018-03-12 RX ADMIN — Medication 3 MILLILITER(S): at 12:28

## 2018-03-12 RX ADMIN — CETIRIZINE HYDROCHLORIDE 5 MILLIGRAM(S): 10 TABLET ORAL at 12:27

## 2018-03-12 RX ADMIN — Medication 3 MILLILITER(S): at 05:33

## 2018-03-12 NOTE — PROGRESS NOTE ADULT - ATTENDING COMMENTS
Agree with above NP note.  cv stable
Agree with above NP note.  cv stable   cont current tx
Agree with above NP note.  cv stable  cont current tx  med/pulmo f/u
agree with above NP note.  cv stable  cont lasix as ordered
Agree with above NP note.  cv stable  cont current tx  med/pulmo f/u

## 2018-03-12 NOTE — PROGRESS NOTE ADULT - PROVIDER SPECIALTY LIST ADULT
Cardiology
Internal Medicine
Neurology
Psychiatry
Pulmonology
Cardiology
Internal Medicine
Pulmonology

## 2018-03-12 NOTE — PROGRESS NOTE ADULT - ASSESSMENT
68yr old female with advanced Alzheimer's dementia, paranoid schizophrenia, hypothyroidism, PVD, who was sent from [facility] for tachypnea and hypoxia    1. Hypoxia   likely from viral illness  + RSV   chest xray revealing no focal consolidation   CTA chest neg for PE   no CHF on exam   cont low dose lasix  echo revealing normal LV sys fx , mod diastolic dysfx   pulm f/u     2. cv stable   no chest pain or sob.  EKG no evidence of acute ischemia/ACS  echo revealing normal LV sys fx , mod diastolic dysfx     dvt ppx

## 2018-03-12 NOTE — CHART NOTE - NSCHARTNOTEFT_GEN_A_CORE
Request from Dr. Leal to facilitate patient discharge. Medication reconciliation reviewed, revised, and resolved with Dr. Leal who had medically cleared patient for discharge with follow-up as advised. Please refer to discharge note for detailed hospital course. Patient is currently stable for discharge to Rehab at this time.    Contact # [phone]

## 2018-03-12 NOTE — PROGRESS NOTE ADULT - ASSESSMENT
This is a 68yr old female with advanced Alzheimer's dementia, paranoid schizophrenia, hypothyroidism, PVD, who was sent from [facility] for tachypnea and hypoxia     Problem/Plan - 1:  ·  Problem: Sepsis due to RSV broncholitis.   Resolved.  -     Problem/Plan - 2:  ·  Problem: Tachypnea.  Plan: Pt with tachypnea 30-50s, also with tachycardia and hypoxia. No calf tenderness. Wells score at least 3.         Problem/Plan - 3:  ·  Problem: RSV bronchitis.  Plan: Duoneb. Pul f/up noted.     Problem/Plan - 4:  ·  Problem: Acute respiratory failure with hypoxia.Improving Plan: Pt sent in for hypoxia, requiring NC O2. Unclear etiology at this time - 2/2 RSV bronchitis vs. PE vs. superimposed PNA  - Pul f/up noted.  - pt desaturated to 84% on ra after ambulating     Problem/Plan - 5:  ·  Problem: Paranoid schizophrenia. Plan: stable  - continue risperidone and ativan.     Problem/Plan - 6:  Problem: Hypothyroidism, unspecified type. Plan: continue synthroid.     Problem/Plan - 7:  ·  Problem: Alzheimer's dementia. Plan: at baseline.   - frequent orientation.    continue current care  appreciate pulm input, who wishes to continue to monitor pt over weekend in house.  observe sats off oxygen  encourage deep breathing    Dc planning.

## 2018-03-12 NOTE — PROGRESS NOTE ADULT - ASSESSMENT
Schizophrenia with anxiety  No clinical suspicion of pulmonary infection  Diastolic CHF: intermittent tachynpnea may be partially related  Dementia  Intermittent desaturation: suspect related to atelectasis associated with periods of anxiety induce rapid shallow breathing. Better mobilization will likely eliminate need for O2 during ambulation.    REC    Contunue nebulizers  Observe off abx  Doubt utility or practicality of home O2  Would monitor as inpatient - poor candidate for O2 for ambulation

## 2018-03-12 NOTE — PROGRESS NOTE ADULT - SUBJECTIVE AND OBJECTIVE BOX
CARDIOLOGY FOLLOW UP - Dr. Denny    CC no acute events       PHYSICAL EXAM:  T(C): 36.7 (03-10-18 @ 03:30), Max: 37 (03-09-18 @ 11:52)  HR: 86 (03-10-18 @ 03:30) (84 - 90)  BP: 108/70 (03-10-18 @ 03:30) (106/68 - 108/70)  RR: 19 (03-10-18 @ 03:30) (18 - 19)  SpO2: 90% (03-10-18 @ 03:30) (90% - 91%)  Wt(kg): --  I&O's Summary    09 Mar 2018 07:01  -  10 Mar 2018 07:00  --------------------------------------------------------  IN: 1000 mL / OUT: 0 mL / NET: 1000 mL        Appearance: NAD 	  Cardiovascular: Normal S1 S2,RRR, No JVD, No murmurs  Respiratory: Lungs clear to auscultation	  Gastrointestinal:  Soft, Non-tender, + BS	  Extremities: Normal range of motion, No clubbing, cyanosis or edema        MEDICATIONS  (STANDING):  ALBUTerol/ipratropium for Nebulization 3 milliLiter(s) Nebulizer every 6 hours  cetirizine 5 milliGRAM(s) Oral daily  famotidine    Tablet 20 milliGRAM(s) Oral daily  furosemide    Tablet 20 milliGRAM(s) Oral daily  heparin  Injectable 5000 Unit(s) SubCutaneous every 8 hours  levothyroxine 50 MICROGram(s) Oral daily  risperiDONE   Solution 1 milliGRAM(s) Oral two times a day      TELEMETRY: NSR 	    ECG:  	  RADIOLOGY:   DIAGNOSTIC TESTING:  [ ] Echocardiogram:  [ ]  Catheterization:  [ ] Stress Test:    OTHER: 	    LABS:	 	                                13.4   8.65  )-----------( 284      ( 09 Mar 2018 08:09 )             40.3     03-09    140  |  101  |  20  ----------------------------<  119<H>  3.9   |  24  |  0.96    Ca    9.4      09 Mar 2018 06:31
Follow-up Pulm Progress Note  Peng Murray MD  [phone]    Afebrile off antibiotics  O2 sats 90% on RA at rest  Stable clinically, intermittent rapid shallow breathing      Vital Signs Last 24 Hrs  T(C): 36.8 (12 Mar 2018 05:27), Max: 37.2 (11 Mar 2018 21:01)  T(F): 98.2 (12 Mar 2018 05:27), Max: 98.9 (11 Mar 2018 21:01)  HR: 80 (12 Mar 2018 05:27) (80 - 89)  BP: 110/68 (12 Mar 2018 05:27) (107/72 - 110/76)  BP(mean): --  RR: 20 (12 Mar 2018 08:50) (18 - 25)  SpO2: 93% (12 Mar 2018 08:50) (86% - 94%)                          12.3   8.61  )-----------( 257      ( 12 Mar 2018 07:21 )             39.3       03-12    139  |  102  |  21  ----------------------------<  125<H>  3.9   |  25  |  0.97    Ca    8.9      12 Mar 2018 06:08      Culture Results:   >100,000 CFU/ml Proteus mirabilis (02-28 @ 20:57)  Culture Results:   No growth to date. (02-28 @ 17:15)  Culture Results:   No growth to date. (02-28 @ 17:15)    Most recent blood culture -- 02-28 @ 20:57   -- -- .Urine Clean Catch (Midstream) 02-28 @ 20:57  Most recent blood culture -- 02-28 @ 17:15   -- -- .Blood Blood 02-28 @ 17:15      Physical Examination:  PULM: without wheeze or rhonchi: shollow insp  CVS: Regular rate and rhythm, no murmurs, rubs, or gallops  ABD: Soft, non-tender  EXT:  No clubbing, cyanosis, or edema    RADIOLOGY REVIEWED  CXR:    CT chest:    TTE:
Patient is a 68y old  Female who presents with a chief complaint of Tachypnea/hypoxia (2018 16:59)      SUBJECTIVE / OVERNIGHT EVENTS:   Feels better.  Denies CP/SOB/Palpitation/HA.    MEDICATIONS  (STANDING):  ALBUTerol/ipratropium for Nebulization 3 milliLiter(s) Nebulizer every 6 hours  cetirizine 5 milliGRAM(s) Oral daily  famotidine   Suspension 20 milliGRAM(s) Oral daily  heparin  Injectable 5000 Unit(s) SubCutaneous every 8 hours  levothyroxine 50 MICROGram(s) Oral daily  LORazepam     Tablet 1 milliGRAM(s) Oral at bedtime  predniSONE   Tablet 40 milliGRAM(s) Oral daily  risperiDONE   Solution 1 milliGRAM(s) Oral two times a day    MEDICATIONS  (PRN):        CAPILLARY BLOOD GLUCOSE      POCT Blood Glucose.: 113 mg/dL (02 Mar 2018 16:33)    I&O's Summary    01 Mar 2018 07:  -  02 Mar 2018 07:00  --------------------------------------------------------  IN: 1260 mL / OUT: 0 mL / NET: 1260 mL    02 Mar 2018 07:  -  02 Mar 2018 17:13  --------------------------------------------------------  IN: 1220 mL / OUT: 0 mL / NET: 1220 mL        PHYSICAL EXAM:  GENERAL: NAD, well-developed  HEAD:  Atraumatic, Normocephalic  NECK: Supple, No JVD  CHEST/LUNG: Clear to auscultation bilaterally; No wheezing.  HEART: Regular rate and rhythm; No murmurs, rubs, or gallops  ABDOMEN: Soft, Nontender, Nondistended; Bowel sounds present  EXTREMITIES:   No clubbing, cyanosis, or edema  NEUROLOGY: AAO X 3  SKIN: No rashes    LABS:                        10.9   13.17 )-----------( 276      ( 02 Mar 2018 07:18 )             33.1     03-02    141  |  106  |  25<H>  ----------------------------<  97  3.8   |  24  |  0.88    Ca    8.7      02 Mar 2018 05:13            Urinalysis Basic - ( 2018 17:20 )    Color: PL Yellow / Appearance: Clear / S.016 / pH: x  Gluc: x / Ketone: Negative  / Bili: Negative / Urobili: Negative   Blood: x / Protein: Negative / Nitrite: Negative   Leuk Esterase: Moderate / RBC: 3-5 /HPF / WBC 26-50 /HPF   Sq Epi: x / Non Sq Epi: x / Bacteria: x      CAPILLARY BLOOD GLUCOSE      POCT Blood Glucose.: 113 mg/dL (02 Mar 2018 16:33)     @ 20:57  Culture-urine --  Culture results   >100,000 CFU/ml Proteus mirabilis  method type --  Organism --  Organism Identification --  Specimen source .Urine Clean Catch (Midstream)   @ 17:15  Culture-urine --  Culture results   No growth to date.  method type --  Organism --  Organism Identification --  Specimen source .Blood Blood            @ 20:57  Culture blood --  Culture results   >100,000 CFU/ml Proteus mirabilis  Gram stain --  Gram stain blood --  Method type --  Organism --  Organism identification --  Specimen source .Urine Clean Catch (Midstream)    @ 17:15  Culture blood --  Culture results   No growth to date.  Gram stain --  Gram stain blood --  Method type --  Organism --  Organism identification --  Specimen source .Blood Blood      RADIOLOGY & ADDITIONAL TESTS:    Imaging Personally Reviewed:    Consultant(s) Notes Reviewed:      Care Discussed with Consultants/Other Providers:
CARDIOLOGY FOLLOW UP - Dr. Denny    CC no acute events        PHYSICAL EXAM:  T(C): 36.6 (03-08-18 @ 04:05), Max: 36.6 (03-08-18 @ 04:05)  HR: 72 (03-08-18 @ 04:05) (72 - 92)  BP: 97/63 (03-08-18 @ 04:05) (97/63 - 105/71)  RR: 18 (03-08-18 @ 04:05) (18 - 19)  SpO2: 94% (03-08-18 @ 04:05) (84% - 95%)  Wt(kg): --  I&O's Summary    07 Mar 2018 07:01  -  08 Mar 2018 07:00  --------------------------------------------------------  IN: 840 mL / OUT: 0 mL / NET: 840 mL    08 Mar 2018 07:01  -  08 Mar 2018 11:46  --------------------------------------------------------  IN: 360 mL / OUT: 0 mL / NET: 360 mL        Appearance: Normal	  Cardiovascular: Normal S1 S2,RRR, No JVD, No murmurs  Respiratory: Lungs clear to auscultation	  Gastrointestinal:  Soft, Non-tender, + BS	  Extremities: Normal range of motion, No clubbing, cyanosis or edema        MEDICATIONS  (STANDING):  ALBUTerol/ipratropium for Nebulization 3 milliLiter(s) Nebulizer every 6 hours  cetirizine 5 milliGRAM(s) Oral daily  famotidine    Tablet 20 milliGRAM(s) Oral daily  furosemide    Tablet 20 milliGRAM(s) Oral daily  heparin  Injectable 5000 Unit(s) SubCutaneous every 8 hours  levoFLOXacin  Tablet 500 milliGRAM(s) Oral every 24 hours  levothyroxine 50 MICROGram(s) Oral daily  risperiDONE   Solution 1 milliGRAM(s) Oral two times a day      TELEMETRY: 	    ECG:  	  RADIOLOGY:   DIAGNOSTIC TESTING:  [ ] Echocardiogram:  [ ]  Catheterization:  [ ] Stress Test:    OTHER: 	    LABS:	 	                                13.1   9.17  )-----------( 298      ( 08 Mar 2018 07:35 )             39.3     03-08    141  |  101  |  28<H>  ----------------------------<  119<H>  4.0   |  26  |  0.96    Ca    9.1      08 Mar 2018 06:14
CARDIOLOGY FOLLOW UP - Dr. Denny    CC no acute events       PHYSICAL EXAM:  T(C): 36.7 (03-11-18 @ 05:11), Max: 36.7 (03-10-18 @ 12:29)  HR: 86 (03-11-18 @ 05:11) (86 - 97)  BP: 110/71 (03-11-18 @ 05:11) (106/65 - 126/79)  RR: 18 (03-11-18 @ 05:11) (18 - 19)  SpO2: 93% (03-11-18 @ 05:11) (90% - 97%)  Wt(kg): --  I&O's Summary    10 Mar 2018 06:01  -  11 Mar 2018 07:00  --------------------------------------------------------  IN: 1360 mL / OUT: 0 mL / NET: 1360 mL        Appearance: Normal	  Cardiovascular: Normal S1 S2,RRR, No JVD, No murmurs  Respiratory: Lungs clear to auscultation	  Gastrointestinal:  Soft, Non-tender, + BS	  Extremities: Normal range of motion, No clubbing, cyanosis or edema        MEDICATIONS  (STANDING):  ALBUTerol/ipratropium for Nebulization 3 milliLiter(s) Nebulizer every 6 hours  cetirizine 5 milliGRAM(s) Oral daily  famotidine    Tablet 20 milliGRAM(s) Oral daily  furosemide    Tablet 20 milliGRAM(s) Oral daily  heparin  Injectable 5000 Unit(s) SubCutaneous every 8 hours  levothyroxine 50 MICROGram(s) Oral daily  risperiDONE   Solution 1 milliGRAM(s) Oral two times a day      TELEMETRY: NSR- sinus tachycardia  HR	    ECG:  	  RADIOLOGY:   DIAGNOSTIC TESTING:  [ ] Echocardiogram:  [ ]  Catheterization:  [ ] Stress Test:    OTHER: 	    LABS:	 	                                12.1   8.38  )-----------( 264      ( 11 Mar 2018 06:52 )             38.2     03-11    140  |  103  |  24<H>  ----------------------------<  124<H>  3.9   |  24  |  1.12    Ca    9.1      11 Mar 2018 05:32
CARDIOLOGY FOLLOW UP - Dr. Denny    CC no acute events       PHYSICAL EXAM:  T(C): 36.8 (03-12-18 @ 05:27), Max: 37.2 (03-11-18 @ 21:01)  HR: 80 (03-12-18 @ 05:27) (80 - 89)  BP: 110/68 (03-12-18 @ 05:27) (107/72 - 110/76)  RR: 20 (03-12-18 @ 08:50) (18 - 25)  SpO2: 93% (03-12-18 @ 08:50) (86% - 94%)  Wt(kg): --  I&O's Summary    11 Mar 2018 07:01  -  12 Mar 2018 07:00  --------------------------------------------------------  IN: 1080 mL / OUT: 0 mL / NET: 1080 mL        Appearance: Normal	  Cardiovascular: Normal S1 S2,RRR, No JVD, No murmurs  Respiratory: Lungs clear to auscultation	  Gastrointestinal:  Soft, Non-tender, + BS	  Extremities: Normal range of motion, No clubbing, cyanosis or edema        MEDICATIONS  (STANDING):  ALBUTerol/ipratropium for Nebulization 3 milliLiter(s) Nebulizer every 6 hours  cetirizine 5 milliGRAM(s) Oral daily  famotidine    Tablet 20 milliGRAM(s) Oral daily  furosemide    Tablet 20 milliGRAM(s) Oral daily  heparin  Injectable 5000 Unit(s) SubCutaneous every 8 hours  levothyroxine 50 MICROGram(s) Oral daily  risperiDONE   Solution 1 milliGRAM(s) Oral two times a day      TELEMETRY:NSR 	    ECG:  	  RADIOLOGY:   DIAGNOSTIC TESTING:  [ ] Echocardiogram:  [ ]  Catheterization:  [ ] Stress Test:    OTHER: 	    LABS:	 	                                12.3   8.61  )-----------( 257      ( 12 Mar 2018 07:21 )             39.3     03-12    139  |  102  |  21  ----------------------------<  125<H>  3.9   |  25  |  0.97    Ca    8.9      12 Mar 2018 06:08
CARDIOLOGY FOLLOW UP - Dr. Denny    CC no chest pain or sob        PHYSICAL EXAM:  T(C): 36.4 (03-02-18 @ 11:30), Max: 36.7 (03-01-18 @ 20:42)  HR: 78 (03-02-18 @ 12:34) (68 - 80)  BP: 152/85 (03-02-18 @ 12:34) (101/62 - 152/85)  RR: 18 (03-02-18 @ 12:34) (17 - 18)  SpO2: 94% (03-02-18 @ 12:34) (93% - 98%)  Wt(kg): --  I&O's Summary    01 Mar 2018 07:01  -  02 Mar 2018 07:00  --------------------------------------------------------  IN: 1260 mL / OUT: 0 mL / NET: 1260 mL    02 Mar 2018 07:01  -  02 Mar 2018 14:23  --------------------------------------------------------  IN: 1220 mL / OUT: 0 mL / NET: 1220 mL        Appearance: Normal	  Cardiovascular: Normal S1 S2,RRR, No JVD, No murmurs  Respiratory: Lungs clear to auscultation	  Gastrointestinal:  Soft, Non-tender, + BS	  Extremities: Normal range of motion, No clubbing, cyanosis or edema        MEDICATIONS  (STANDING):  ALBUTerol/ipratropium for Nebulization 3 milliLiter(s) Nebulizer every 6 hours  cetirizine 5 milliGRAM(s) Oral daily  famotidine   Suspension 20 milliGRAM(s) Oral daily  heparin  Injectable 5000 Unit(s) SubCutaneous every 8 hours  levothyroxine 50 MICROGram(s) Oral daily  LORazepam     Tablet 1 milliGRAM(s) Oral at bedtime  predniSONE   Tablet 40 milliGRAM(s) Oral daily  risperiDONE   Solution 1 milliGRAM(s) Oral two times a day      TELEMETRY: NSR- sinus tachycardia HR 90- 110 	    ECG:  	  RADIOLOGY:   DIAGNOSTIC TESTING:  [ ] Echocardiogram:  < from: Transthoracic Echocardiogram (03.02.18 @ 08:15) >  EF (Visual Estimate): 70-75 %    Conclusions:  1. Increased relative wall thickness with normal left  ventricular mass index, consistent with concentric left  ventricular remodeling.  2. Normal left ventricular systolic function. No segmental  wall motion abnormalities.  3. Moderate diastolic dysfunction (Stage II).  4. The right ventricle is not well visualized; grossly  normal right ventricular systolic function.  5. No pericardial effusion seen.  ***Prior echo not available for comparison.  ------------------------------------------------------------------------  Confirmed on  3/2/2018 - 10:43:53 by Gallo Pereira M.D.  ------------------------------------------------------------------------    < end of copied text >        [ ]  Catheterization:  [ ] Stress Test:    OTHER: 	    LABS:	 	                                10.9   13.17 )-----------( 276      ( 02 Mar 2018 07:18 )             33.1     03-02    141  |  106  |  25<H>  ----------------------------<  97  3.8   |  24  |  0.88    Ca    8.7      02 Mar 2018 05:13    TPro  6.8  /  Alb  3.6  /  TBili  0.3  /  DBili  x   /  AST  18  /  ALT  25  /  AlkPhos  83  02-28    PT/INR - ( 28 Feb 2018 14:50 )   PT: 10.5 sec;   INR: 0.97 ratio         PTT - ( 28 Feb 2018 14:50 )  PTT:26.2 sec
CARDIOLOGY FOLLOW UP - Dr. Denny    CC no chest pain or sob       PHYSICAL EXAM:  T(C): 36.4 (03-09-18 @ 04:25), Max: 36.9 (03-08-18 @ 22:45)  HR: 85 (03-09-18 @ 04:25) (85 - 85)  BP: 106/68 (03-09-18 @ 04:25) (106/68 - 112/63)  RR: 18 (03-09-18 @ 04:25) (18 - 18)  SpO2: 95% (03-09-18 @ 04:25) (95% - 100%)  Wt(kg): --  I&O's Summary    08 Mar 2018 07:01  -  09 Mar 2018 07:00  --------------------------------------------------------  IN: 1620 mL / OUT: 0 mL / NET: 1620 mL    09 Mar 2018 07:01  -  09 Mar 2018 10:29  --------------------------------------------------------  IN: 120 mL / OUT: 0 mL / NET: 120 mL        Appearance: NAD  	  Cardiovascular: Normal S1 S2,RRR, No JVD, No murmurs  Respiratory: Lungs clear to auscultation	  Gastrointestinal:  Soft, Non-tender, + BS	  Extremities: Normal range of motion, No clubbing, cyanosis or edema        MEDICATIONS  (STANDING):  ALBUTerol/ipratropium for Nebulization 3 milliLiter(s) Nebulizer every 6 hours  cetirizine 5 milliGRAM(s) Oral daily  famotidine    Tablet 20 milliGRAM(s) Oral daily  furosemide    Tablet 20 milliGRAM(s) Oral daily  heparin  Injectable 5000 Unit(s) SubCutaneous every 8 hours  levoFLOXacin  Tablet 500 milliGRAM(s) Oral every 24 hours  levothyroxine 50 MICROGram(s) Oral daily  risperiDONE   Solution 1 milliGRAM(s) Oral two times a day      TELEMETRY: 	NSR - sinus tachycardia Hr 110     ECG:  	  RADIOLOGY:   DIAGNOSTIC TESTING:  [ ] Echocardiogram:  [ ]  Catheterization:  [ ] Stress Test:    OTHER: 	    LABS:	 	                                13.4   8.65  )-----------( 284      ( 09 Mar 2018 08:09 )             40.3     03-09    140  |  101  |  20  ----------------------------<  119<H>  3.9   |  24  |  0.96    Ca    9.4      09 Mar 2018 06:31
CARDIOLOGY FOLLOW UP - Dr. Denny    CC no chest pain or sob       PHYSICAL EXAM:  T(C): 36.7 (03-06-18 @ 04:04), Max: 36.9 (03-05-18 @ 11:07)  HR: 65 (03-06-18 @ 04:04) (65 - 76)  BP: 100/63 (03-06-18 @ 04:04) (100/63 - 108/68)  RR: 18 (03-06-18 @ 04:04) (17 - 18)  SpO2: 96% (03-06-18 @ 04:04) (95% - 96%)  Wt(kg): --  I&O's Summary    05 Mar 2018 07:01  -  06 Mar 2018 07:00  --------------------------------------------------------  IN: 350 mL / OUT: 350 mL / NET: 0 mL        Appearance: Normal	  Cardiovascular: Normal S1 S2,RRR, No JVD, No murmurs  Respiratory: Lungs clear to auscultation	  Gastrointestinal:  Soft, Non-tender, + BS	  Extremities: Normal range of motion, No clubbing, cyanosis or edema        MEDICATIONS  (STANDING):  ALBUTerol/ipratropium for Nebulization 3 milliLiter(s) Nebulizer every 6 hours  cetirizine 5 milliGRAM(s) Oral daily  famotidine   Suspension 20 milliGRAM(s) Oral daily  heparin  Injectable 5000 Unit(s) SubCutaneous every 8 hours  levoFLOXacin  Tablet 500 milliGRAM(s) Oral every 24 hours  levothyroxine 50 MICROGram(s) Oral daily  LORazepam     Tablet 1 milliGRAM(s) Oral at bedtime  predniSONE   Tablet 40 milliGRAM(s) Oral daily  risperiDONE   Solution 1 milliGRAM(s) Oral two times a day      TELEMETRY: 	    ECG:  	  RADIOLOGY:   DIAGNOSTIC TESTING:  [ ] Echocardiogram:  [ ]  Catheterization:  [ ] Stress Test:    OTHER: 	    LABS:	 	                                11.5   11.19 )-----------( 282      ( 06 Mar 2018 07:39 )             36.0     03-06    141  |  102  |  22  ----------------------------<  93  4.0   |  30  |  0.93    Ca    9.0      06 Mar 2018 06:07
CARDIOLOGY FOLLOW UP - Dr. Denny    CC no chest pain or sob       PHYSICAL EXAM:  T(C): 36.9 (03-05-18 @ 11:07), Max: 36.9 (03-04-18 @ 20:27)  HR: 76 (03-05-18 @ 11:07) (67 - 82)  BP: 102/67 (03-05-18 @ 11:07) (102/67 - 112/71)  RR: 17 (03-05-18 @ 11:07) (17 - 18)  SpO2: 95% (03-05-18 @ 11:07) (95% - 97%)  Wt(kg): --  I&O's Summary    04 Mar 2018 07:01  -  05 Mar 2018 07:00  --------------------------------------------------------  IN: 1420 mL / OUT: 0 mL / NET: 1420 mL    05 Mar 2018 07:01  -  05 Mar 2018 14:37  --------------------------------------------------------  IN: 300 mL / OUT: 350 mL / NET: -50 mL        Appearance: Normal	  Cardiovascular: Normal S1 S2,RRR   Respiratory: Lungs clear to auscultation	  Gastrointestinal:  Soft, Non-tender, + BS	  Extremities: Normal range of motion, No clubbing, cyanosis or edema        MEDICATIONS  (STANDING):  ALBUTerol/ipratropium for Nebulization 3 milliLiter(s) Nebulizer every 6 hours  cetirizine 5 milliGRAM(s) Oral daily  famotidine   Suspension 20 milliGRAM(s) Oral daily  heparin  Injectable 5000 Unit(s) SubCutaneous every 8 hours  levothyroxine 50 MICROGram(s) Oral daily  LORazepam     Tablet 1 milliGRAM(s) Oral at bedtime  predniSONE   Tablet 40 milliGRAM(s) Oral daily  risperiDONE   Solution 1 milliGRAM(s) Oral two times a day      TELEMETRY: 	    ECG:  	  RADIOLOGY:   DIAGNOSTIC TESTING:  [ ] Echocardiogram:  [ ]  Catheterization:  [ ] Stress Test:    OTHER: 	    LABS:	 	                                11.3   11.92 )-----------( 290      ( 05 Mar 2018 06:39 )             35.7     03-05    142  |  104  |  22  ----------------------------<  90  4.0   |  25  |  0.87    Ca    8.9      05 Mar 2018 06:07
CC: no acute events    TELEMETRY:     PHYSICAL EXAM:    T(C): 36.3 (03-07-18 @ 04:19), Max: 36.8 (03-06-18 @ 20:57)  HR: 72 (03-07-18 @ 04:19) (72 - 89)  BP: 106/74 (03-07-18 @ 04:19) (106/74 - 119/77)  RR: 17 (03-07-18 @ 04:19) (17 - 18)  SpO2: 98% (03-07-18 @ 04:19) (95% - 98%)  Wt(kg): --  I&O's Summary    06 Mar 2018 07:01  -  07 Mar 2018 07:00  --------------------------------------------------------  IN: 1070 mL / OUT: 0 mL / NET: 1070 mL    07 Mar 2018 07:01  -  07 Mar 2018 10:57  --------------------------------------------------------  IN: 480 mL / OUT: 0 mL / NET: 480 mL        Appearance: Normal	  Cardiovascular: Normal S1 S2,RRR, No JVD, No murmurs  Respiratory: Lungs clear to auscultation	  Gastrointestinal:  Soft, Non-tender, + BS	  Extremities: Normal range of motion, No clubbing, cyanosis or edema  Vascular: Peripheral pulses palpable 2+ bilaterally     LABS:	 	                          11.5   11.19 )-----------( 282      ( 06 Mar 2018 07:39 )             36.0     03-06    141  |  102  |  22  ----------------------------<  93  4.0   |  30  |  0.93    Ca    9.0      06 Mar 2018 06:07            CARDIAC MARKERS:    MEDICATIONS  (STANDING):  ALBUTerol/ipratropium for Nebulization 3 milliLiter(s) Nebulizer every 6 hours  cetirizine 5 milliGRAM(s) Oral daily  famotidine   Suspension 20 milliGRAM(s) Oral daily  furosemide    Tablet 20 milliGRAM(s) Oral daily  heparin  Injectable 5000 Unit(s) SubCutaneous every 8 hours  levoFLOXacin  Tablet 500 milliGRAM(s) Oral every 24 hours  levothyroxine 50 MICROGram(s) Oral daily  LORazepam     Tablet 1 milliGRAM(s) Oral at bedtime  risperiDONE   Solution 1 milliGRAM(s) Oral two times a day
CC: no events    TELEMETRY:     PHYSICAL EXAM:    T(C): 36.4 (03-04-18 @ 03:45), Max: 36.9 (03-03-18 @ 21:10)  HR: 69 (03-04-18 @ 03:45) (65 - 73)  BP: 128/82 (03-04-18 @ 03:45) (97/61 - 128/82)  RR: 18 (03-04-18 @ 03:45) (18 - 18)  SpO2: 95% (03-04-18 @ 03:45) (95% - 97%)  Wt(kg): --  I&O's Summary    03 Mar 2018 07:01  -  04 Mar 2018 07:00  --------------------------------------------------------  IN: 1060 mL / OUT: 0 mL / NET: 1060 mL        Appearance: Normal	  Cardiovascular: Normal S1 S2,RRR, No JVD, No murmurs  Respiratory: Lungs clear to auscultation	  Gastrointestinal:  Soft, Non-tender, + BS	  Extremities: Normal range of motion, No clubbing, cyanosis or edema  Vascular: Peripheral pulses palpable 2+ bilaterally     LABS:	 	                          12.9   14.31 )-----------( 291      ( 04 Mar 2018 08:25 )             39.2     03-04    142  |  104  |  24<H>  ----------------------------<  85  4.3   |  27  |  0.90    Ca    9.2      04 Mar 2018 06:15            CARDIAC MARKERS:
CC: no events    TELEMETRY:     PHYSICAL EXAM:    T(C): 36.8 (03-03-18 @ 11:23), Max: 36.8 (03-03-18 @ 11:23)  HR: 73 (03-03-18 @ 11:23) (73 - 80)  BP: 104/69 (03-03-18 @ 11:23) (102/62 - 152/85)  RR: 18 (03-03-18 @ 11:23) (17 - 20)  SpO2: 96% (03-03-18 @ 11:23) (94% - 98%)  Wt(kg): --  I&O's Summary    02 Mar 2018 07:01  -  03 Mar 2018 07:00  --------------------------------------------------------  IN: 1220 mL / OUT: 0 mL / NET: 1220 mL        Appearance: Normal	  Cardiovascular: Normal S1 S2,RRR, No JVD, No murmurs  Respiratory: Lungs clear to auscultation	  Gastrointestinal:  Soft, Non-tender, + BS	  Extremities: Normal range of motion, No clubbing, cyanosis or edema  Vascular: Peripheral pulses palpable 2+ bilaterally     LABS:	 	                          11.5   11.45 )-----------( 273      ( 03 Mar 2018 07:43 )             35.5     03-03    143  |  106  |  26<H>  ----------------------------<  93  3.9   |  24  |  0.90    Ca    8.7      03 Mar 2018 05:17            CARDIAC MARKERS:
Events noted. Received one prn Seroquel today. Calm now. Offers no complaints.       Vital Signs Last 24 Hrs  T(C): 37 (09 Mar 2018 11:52), Max: 37 (09 Mar 2018 11:52)  T(F): 98.6 (09 Mar 2018 11:52), Max: 98.6 (09 Mar 2018 11:52)  HR: 90 (09 Mar 2018 11:52) (85 - 90)  BP: 106/74 (09 Mar 2018 11:52) (106/68 - 112/63)  BP(mean): --  RR: 18 (09 Mar 2018 11:52) (18 - 18)  SpO2: 91% (09 Mar 2018 11:52) (91% - 100%)                          13.4   8.65  )-----------( 284      ( 09 Mar 2018 08:09 )             40.3       03-09    140  |  101  |  20  ----------------------------<  119<H>  3.9   |  24  |  0.96    Ca    9.4      09 Mar 2018 06:31                MEDICATIONS  (STANDING):  ALBUTerol/ipratropium for Nebulization 3 milliLiter(s) Nebulizer every 6 hours  cetirizine 5 milliGRAM(s) Oral daily  famotidine    Tablet 20 milliGRAM(s) Oral daily  furosemide    Tablet 20 milliGRAM(s) Oral daily  heparin  Injectable 5000 Unit(s) SubCutaneous every 8 hours  levoFLOXacin  Tablet 500 milliGRAM(s) Oral every 24 hours  levothyroxine 50 MICROGram(s) Oral daily  risperiDONE   Solution 1 milliGRAM(s) Oral two times a day    MEDICATIONS  (PRN):  QUEtiapine 12.5 milliGRAM(s) Oral every 6 hours PRN anxiety      Elderly  female in bed, calm, cooperative, alert and oriented x 2 .  No psychomotor abnormalities. Insight and judgment are impaired. Speech is minimal with mainly Yes and No responses but is coherent with normal rate and volume. No hallucinations nor delusions. The patient denied suicidal and homicidal ideation and plan. Mood is euthymic and affect flat.  Attention and concentration wnl but impaired short term memory. Long term memory within normal limits. Less rapid breathing compared with my visit yesterday.
Follow-up Pulm Progress Note  Peng Murray MD  [phone]    No new respiratory events overnight.    Afebrile off abx: WBC spont normalizing  Denies SOB  Anxious today  Sat 98% on nasal o2      Vital Signs Last 24 Hrs  T(C): 36.9 (05 Mar 2018 11:07), Max: 36.9 (04 Mar 2018 20:27)  T(F): 98.4 (05 Mar 2018 11:07), Max: 98.4 (04 Mar 2018 20:27)  HR: 76 (05 Mar 2018 11:07) (67 - 82)  BP: 102/67 (05 Mar 2018 11:07) (102/67 - 112/71)  BP(mean): --  RR: 17 (05 Mar 2018 11:07) (17 - 18)  SpO2: 95% (05 Mar 2018 11:07) (95% - 97%)                          11.3   11.92 )-----------( 290      ( 05 Mar 2018 06:39 )             35.7       03-05    142  |  104  |  22  ----------------------------<  90  4.0   |  25  |  0.87    Ca    8.9      05 Mar 2018 06:07      Culture Results:   >100,000 CFU/ml Proteus mirabilis (02-28 @ 20:57)  Culture Results:   No growth to date. (02-28 @ 17:15)  Culture Results:   No growth to date. (02-28 @ 17:15)    Most recent blood culture -- 02-28 @ 20:57   -- -- .Urine Clean Catch (Midstream) 02-28 @ 20:57  Most recent blood culture -- 02-28 @ 17:15   -- -- .Blood Blood 02-28 @ 17:15      Physical Examination:  PULM: without wheeze or rhonchi  CVS: Regular rate and rhythm, no murmurs, rubs, or gallops  ABD: Soft, non-tender  EXT:  No clubbing, cyanosis, or edema    RADIOLOGY REVIEWED  CXR:    CT chest:    TTE:
Follow-up Pulm Progress Note  Peng Murray MD  [phone]    Afebrile on Levaquin  Reportedly desaturated to 84% ambulating on RA , normal sats at rest  CTA ordered by medicine: No PE, no pneumonia, basilar atelectasis and HH  Breathing comfortably at rest without anxiety: RA sat in bed is 91%  No SOB      Vital Signs Last 24 Hrs  T(C): 36.6 (08 Mar 2018 04:05), Max: 36.6 (08 Mar 2018 04:05)  T(F): 97.9 (08 Mar 2018 04:05), Max: 97.9 (08 Mar 2018 04:05)  HR: 72 (08 Mar 2018 04:05) (72 - 92)  BP: 97/63 (08 Mar 2018 04:05) (97/63 - 105/71)  BP(mean): --  RR: 18 (08 Mar 2018 04:05) (18 - 19)  SpO2: 94% (08 Mar 2018 04:05) (84% - 95%)                          13.1   9.17  )-----------( 298      ( 08 Mar 2018 07:35 )             39.3       03-08    141  |  101  |  28<H>  ----------------------------<  119<H>  4.0   |  26  |  0.96    Ca    9.1      08 Mar 2018 06:14      Culture Results:   >100,000 CFU/ml Proteus mirabilis (02-28 @ 20:57)  Culture Results:   No growth to date. (02-28 @ 17:15)  Culture Results:   No growth to date. (02-28 @ 17:15)    Most recent blood culture -- 02-28 @ 20:57   -- -- .Urine Clean Catch (Midstream) 02-28 @ 20:57  Most recent blood culture -- 02-28 @ 17:15   -- -- .Blood Blood 02-28 @ 17:15      Physical Examination:  PULM: without wheeze or rhonchi  CVS: Regular rate and rhythm, no murmurs, rubs, or gallops  ABD: Soft, non-tender  EXT:  No clubbing, cyanosis, or edema    RADIOLOGY REVIEWED  CXR:    CT chest:    TTE:
Follow-up Pulm Progress Note  Peng Murray MD  [phone]    Afebrile  Breathing comfortably on nasal O2  Sats borderline yesterday  No SOB      Vital Signs Last 24 Hrs  T(C): 36.6 (08 Mar 2018 04:05), Max: 36.6 (08 Mar 2018 04:05)  T(F): 97.9 (08 Mar 2018 04:05), Max: 97.9 (08 Mar 2018 04:05)  HR: 72 (08 Mar 2018 04:05) (72 - 92)  BP: 97/63 (08 Mar 2018 04:05) (97/63 - 105/71)  BP(mean): --  RR: 18 (08 Mar 2018 04:05) (18 - 19)  SpO2: 94% (08 Mar 2018 04:05) (84% - 95%)                              13.1   9.17  )-----------( 298      ( 08 Mar 2018 07:35 )             39.3       03-08    141  |  101  |  28<H>  ----------------------------<  119<H>  4.0   |  26  |  0.96    Ca    9.1      08 Mar 2018 06:14      Culture Results:   >100,000 CFU/ml Proteus mirabilis (02-28 @ 20:57)  Culture Results:   No growth to date. (02-28 @ 17:15)  Culture Results:   No growth to date. (02-28 @ 17:15)    Most recent blood culture -- 02-28 @ 20:57   -- -- .Urine Clean Catch (Midstream) 02-28 @ 20:57  Most recent blood culture -- 02-28 @ 17:15   -- -- .Blood Blood 02-28 @ 17:15      Physical Examination:  PULM: without wheeze or rhonchi  CVS: Regular rate and rhythm, no murmurs, rubs, or gallops  ABD: Soft, non-tender  EXT:  No clubbing, cyanosis, or edema    RADIOLOGY REVIEWED  CXR:    CT chest:    TTE:
Follow-up Pulm Progress Note  Peng Murray MD  [phone]    No new respiratory events overnight.    Afebrile : Levaquin started 3/5 for ?UTI  Denies SOB  TTE: moderate diastolic dysfunction with severely increased LA    Vital Signs Last 24 Hrs  T(C): 36.9 (05 Mar 2018 11:07), Max: 36.9 (04 Mar 2018 20:27)  T(F): 98.4 (05 Mar 2018 11:07), Max: 98.4 (04 Mar 2018 20:27)  HR: 76 (05 Mar 2018 11:07) (67 - 82)  BP: 102/67 (05 Mar 2018 11:07) (102/67 - 112/71)  BP(mean): --  RR: 17 (05 Mar 2018 11:07) (17 - 18)  SpO2: 95% (05 Mar 2018 11:07) (95% - 97%)                          11.3   11.92 )-----------( 290      ( 05 Mar 2018 06:39 )             35.7       03-05    142  |  104  |  22  ----------------------------<  90  4.0   |  25  |  0.87    Ca    8.9      05 Mar 2018 06:07      Culture Results:   >100,000 CFU/ml Proteus mirabilis (02-28 @ 20:57)  Culture Results:   No growth to date. (02-28 @ 17:15)  Culture Results:   No growth to date. (02-28 @ 17:15)    Most recent blood culture -- 02-28 @ 20:57   -- -- .Urine Clean Catch (Midstream) 02-28 @ 20:57  Most recent blood culture -- 02-28 @ 17:15   -- -- .Blood Blood 02-28 @ 17:15      Physical Examination:  PULM: without wheeze or rhonchi  CVS: Regular rate and rhythm, no murmurs, rubs, or gallops  ABD: Soft, non-tender  EXT:  No clubbing, cyanosis, or edema    RADIOLOGY REVIEWED  CXR:    CT chest:    TTE:
Follow-up Pulm Progress Note  Peng Murray MD  [phone]    No new respiratory events overnight.    Afebrile off abx  Anxious today  Sat 98% on nasal o2    Medications:  Vital Signs Last 24 Hrs  T(C): 36.4 (02 Mar 2018 11:30), Max: 36.7 (01 Mar 2018 20:42)  T(F): 97.6 (02 Mar 2018 11:30), Max: 98 (01 Mar 2018 20:42)  HR: 80 (02 Mar 2018 11:30) (68 - 80)  BP: 115/72 (02 Mar 2018 11:30) (101/62 - 115/72)  BP(mean): --  RR: 17 (02 Mar 2018 11:30) (17 - 18)  SpO2: 98% (02 Mar 2018 11:30) (93% - 98%)      03-01 @ 07:01  -  03-02 @ 07:00  --------------------------------------------------------  IN: 1260 mL / OUT: 0 mL / NET: 1260 mL        LABS:                        10.9   13.17 )-----------( 276      ( 02 Mar 2018 07:18 )             33.1     03-02    141  |  106  |  25<H>  ----------------------------<  97  3.8   |  24  |  0.88    Ca    8.7      02 Mar 2018 05:13    TPro  6.8  /  Alb  3.6  /  TBili  0.3  /  DBili  x   /  AST  18  /  ALT  25  /  AlkPhos  83  02-28      PT/INR - ( 28 Feb 2018 14:50 )   PT: 10.5 sec;   INR: 0.97 ratio      PTT - ( 28 Feb 2018 14:50 )  PTT:26.2 sec    Serum Pro-Brain Natriuretic Peptide: 35 pg/mL (02-28-18 @ 14:50)      CULTURES:  Culture Results:   >100,000 CFU/ml Proteus mirabilis (02-28 @ 20:57)  Culture Results:   No growth to date. (02-28 @ 17:15)  Culture Results:   No growth to date. (02-28 @ 17:15)    Most recent blood culture -- 02-28 @ 20:57   -- -- .Urine Clean Catch (Midstream) 02-28 @ 20:57  Most recent blood culture -- 02-28 @ 17:15   -- -- .Blood Blood 02-28 @ 17:15      Physical Examination:  PULM: without wheee or rhonchi  CVS: Regular rate and rhythm, no murmurs, rubs, or gallops  ABD: Soft, non-tender  EXT:  No clubbing, cyanosis, or edema    RADIOLOGY REVIEWED  CXR:    CT chest:    TTE:
Follow-up Pulm Progress Note  Peng Murray MD  [phone]    No new respiratory events overnight.    Afebrile off abx: WBC spont normalizing  Denies SOB  Anxious today  Sat 98% on nasal o2    Medications:  Vital Signs Last 24 Hrs  T(C): 36.4 (02 Mar 2018 11:30), Max: 36.7 (01 Mar 2018 20:42)  T(F): 97.6 (02 Mar 2018 11:30), Max: 98 (01 Mar 2018 20:42)  HR: 80 (02 Mar 2018 11:30) (68 - 80)  BP: 115/72 (02 Mar 2018 11:30) (101/62 - 115/72)  BP(mean): --  RR: 17 (02 Mar 2018 11:30) (17 - 18)  SpO2: 98% (02 Mar 2018 11:30) (93% - 98%)      03-01 @ 07:01  -  03-02 @ 07:00  --------------------------------------------------------  IN: 1260 mL / OUT: 0 mL / NET: 1260 mL        LABS:                        10.9   13.17 )-----------( 276      ( 02 Mar 2018 07:18 )             33.1     03-02    141  |  106  |  25<H>  ----------------------------<  97  3.8   |  24  |  0.88    Ca    8.7      02 Mar 2018 05:13    TPro  6.8  /  Alb  3.6  /  TBili  0.3  /  DBili  x   /  AST  18  /  ALT  25  /  AlkPhos  83  02-28      PT/INR - ( 28 Feb 2018 14:50 )   PT: 10.5 sec;   INR: 0.97 ratio      PTT - ( 28 Feb 2018 14:50 )  PTT:26.2 sec    Serum Pro-Brain Natriuretic Peptide: 35 pg/mL (02-28-18 @ 14:50)      CULTURES:  Culture Results:   >100,000 CFU/ml Proteus mirabilis (02-28 @ 20:57)  Culture Results:   No growth to date. (02-28 @ 17:15)  Culture Results:   No growth to date. (02-28 @ 17:15)    Most recent blood culture -- 02-28 @ 20:57   -- -- .Urine Clean Catch (Midstream) 02-28 @ 20:57  Most recent blood culture -- 02-28 @ 17:15   -- -- .Blood Blood 02-28 @ 17:15      Physical Examination:  PULM: without wheee or rhonchi  CVS: Regular rate and rhythm, no murmurs, rubs, or gallops  ABD: Soft, non-tender  EXT:  No clubbing, cyanosis, or edema    RADIOLOGY REVIEWED  CXR:    CT chest:    TTE:
Follow-up Pulm Progress Note  Peng Murray MD  [phone]    Afebrile  Breathing cmfortbly on nasal O2  O2 sat 88% RA after ambulating in room    Vital Signs Last 24 Hrs  T(C): 36.3 (07 Mar 2018 12:18), Max: 36.8 (06 Mar 2018 20:57)  T(F): 97.3 (07 Mar 2018 12:18), Max: 98.2 (06 Mar 2018 20:57)  HR: 92 (07 Mar 2018 12:18) (72 - 92)  BP: 105/71 (07 Mar 2018 12:18) (105/71 - 109/72)  BP(mean): --  RR: 19 (07 Mar 2018 12:18) (17 - 19)  SpO2: 95% (07 Mar 2018 12:18) (95% - 98%)                        11.5   11.19 )-----------( 282      ( 06 Mar 2018 07:39 )             36.0       03-06    141  |  102  |  22  ----------------------------<  93  4.0   |  30  |  0.93    Ca    9.0      06 Mar 2018 06:07    Culture Results:   >100,000 CFU/ml Proteus mirabilis (02-28 @ 20:57)  Culture Results:   No growth to date. (02-28 @ 17:15)  Culture Results:   No growth to date. (02-28 @ 17:15)    Most recent blood culture -- 02-28 @ 20:57   -- -- .Urine Clean Catch (Midstream) 02-28 @ 20:57  Most recent blood culture -- 02-28 @ 17:15   -- -- .Blood Blood 02-28 @ 17:15      Physical Examination:  PULM: without wheeze or rhonchi  CVS: Regular rate and rhythm, no murmurs, rubs, or gallops  ABD: Soft, non-tender  EXT:  No clubbing, cyanosis, or edema    RADIOLOGY REVIEWED  CXR:    CT chest:    TTE:
Follow-up Pulm Progress Note  Pneg Murray MD  [phone]    Afebrile off antibiotics  Anxious again today: more rapid/shallow breathing  O2 sat 90% on RA in bed  Doesn't want to walk or get out of bed    Vital Signs Last 24 Hrs  T(C): 36.7 (10 Mar 2018 03:30), Max: 37 (09 Mar 2018 11:52)  T(F): 98 (10 Mar 2018 03:30), Max: 98.6 (09 Mar 2018 11:52)  HR: 86 (10 Mar 2018 03:30) (84 - 90)  BP: 108/70 (10 Mar 2018 03:30) (106/68 - 108/70)  BP(mean): --  RR: 19 (10 Mar 2018 03:30) (18 - 19)  SpO2: 90% (10 Mar 2018 03:30) (90% - 91%)                          13.4   8.65  )-----------( 284      ( 09 Mar 2018 08:09 )             40.3       03-09    140  |  101  |  20  ----------------------------<  119<H>  3.9   |  24  |  0.96    Ca    9.4      09 Mar 2018 06:31        Culture Results:   >100,000 CFU/ml Proteus mirabilis (02-28 @ 20:57)  Culture Results:   No growth to date. (02-28 @ 17:15)  Culture Results:   No growth to date. (02-28 @ 17:15)    Most recent blood culture -- 02-28 @ 20:57   -- -- .Urine Clean Catch (Midstream) 02-28 @ 20:57  Most recent blood culture -- 02-28 @ 17:15   -- -- .Blood Blood 02-28 @ 17:15      Physical Examination:  PULM: without wheeze or rhonchi: shollow insp  CVS: Regular rate and rhythm, no murmurs, rubs, or gallops  ABD: Soft, non-tender  EXT:  No clubbing, cyanosis, or edema    RADIOLOGY REVIEWED  CXR:    CT chest:    TTE:
MEDICINE, PROGRESS NOTE [phone]    [facility] SHEPPARD 68y MRN-[id number]    Patient seen and examined.  Patient is a 68y old  Female who presents with a chief complaint of Tachypnea/hypoxia (06 Mar 2018 14:23)  Pt resting in bed.    PAST MEDICAL & SURGICAL HISTORY:  Hypothyroid  Upper GI bleed  Paranoid schizophrenia  UTI (urinary tract infection)  Alzheimer's dementia  No significant past surgical history    MEDICATIONS  (STANDING):  ALBUTerol/ipratropium for Nebulization 3 milliLiter(s) Nebulizer every 6 hours  cetirizine 5 milliGRAM(s) Oral daily  famotidine    Tablet 20 milliGRAM(s) Oral daily  furosemide    Tablet 20 milliGRAM(s) Oral daily  heparin  Injectable 5000 Unit(s) SubCutaneous every 8 hours  levoFLOXacin  Tablet 500 milliGRAM(s) Oral every 24 hours  levothyroxine 50 MICROGram(s) Oral daily  risperiDONE   Solution 1 milliGRAM(s) Oral two times a day    MEDICATIONS  (PRN):  QUEtiapine 12.5 milliGRAM(s) Oral every 6 hours PRN anxiety    Allergies    No Known Allergies    Intolerances        PHYSICAL EXAM:  Constitutional: NAD  HEENT: Normocephalic, EOMI  Neck:  No JVD  Respiratory: CTA B/L, No wheezes  Cardiovascular: S1, S2, RRR, + systolic murmur  Gastrointestinal: BS+, soft, NT/ND  Extremities: No peripheral edema  Neurological: AAOX3, no focal deficits  Psychiatric: Normal mood, normal affect  : No Lopez    Vital Signs Last 24 Hrs  T(C): 37 (09 Mar 2018 11:52), Max: 37 (09 Mar 2018 11:52)  T(F): 98.6 (09 Mar 2018 11:52), Max: 98.6 (09 Mar 2018 11:52)  HR: 90 (09 Mar 2018 11:52) (85 - 90)  BP: 106/74 (09 Mar 2018 11:52) (106/68 - 112/63)  BP(mean): --  RR: 18 (09 Mar 2018 11:52) (18 - 18)  SpO2: 91% (09 Mar 2018 11:52) (91% - 100%)  I&O's Summary    08 Mar 2018 07:01  -  09 Mar 2018 07:00  --------------------------------------------------------  IN: 1620 mL / OUT: 0 mL / NET: 1620 mL    09 Mar 2018 07:01  -  09 Mar 2018 15:10  --------------------------------------------------------  IN: 240 mL / OUT: 0 mL / NET: 240 mL        LABS:                        13.4   8.65  )-----------( 284      ( 09 Mar 2018 08:09 )             40.3     03-09    140  |  101  |  20  ----------------------------<  119<H>  3.9   |  24  |  0.96    Ca    9.4      09 Mar 2018 06:31
MEDICINE, PROGRESS NOTE [phone]    [facility] SHEPPARD 68y MRN-[id number]    Patient seen and examined.  Patient is a 68y old  Female who presents with a chief complaint of Tachypnea/hypoxia (06 Mar 2018 14:23)      PAST MEDICAL & SURGICAL HISTORY:  Hypothyroid  Upper GI bleed  Paranoid schizophrenia  UTI (urinary tract infection)  Alzheimer's dementia  No significant past surgical history    MEDICATIONS  (STANDING):  ALBUTerol/ipratropium for Nebulization 3 milliLiter(s) Nebulizer every 6 hours  cetirizine 5 milliGRAM(s) Oral daily  famotidine    Tablet 20 milliGRAM(s) Oral daily  furosemide    Tablet 20 milliGRAM(s) Oral daily  heparin  Injectable 5000 Unit(s) SubCutaneous every 8 hours  levoFLOXacin  Tablet 500 milliGRAM(s) Oral every 24 hours  levothyroxine 50 MICROGram(s) Oral daily  risperiDONE   Solution 1 milliGRAM(s) Oral two times a day    MEDICATIONS  (PRN):    Allergies    No Known Allergies    Intolerances        PHYSICAL EXAM:  Constitutional: NAD  HEENT: Normocephalic, EOMI  Neck:  No JVD  Respiratory: CTA B/L, No wheezes  Cardiovascular: S1, S2, RRR, + systolic murmur  Gastrointestinal: BS+, soft, NT/ND  Extremities: No peripheral edema  Neurological: AAOX2,  Psychiatric: flat affect  : No Lopez    Vital Signs Last 24 Hrs  T(C): 36.6 (08 Mar 2018 04:05), Max: 36.6 (08 Mar 2018 04:05)  T(F): 97.9 (08 Mar 2018 04:05), Max: 97.9 (08 Mar 2018 04:05)  HR: 72 (08 Mar 2018 04:05) (72 - 72)  BP: 97/63 (08 Mar 2018 04:05) (97/63 - 97/63)  BP(mean): --  RR: 18 (08 Mar 2018 04:05) (18 - 18)  SpO2: 94% (08 Mar 2018 04:05) (94% - 94%)  I&O's Summary    07 Mar 2018 07:01  -  08 Mar 2018 07:00  --------------------------------------------------------  IN: 840 mL / OUT: 0 mL / NET: 840 mL    08 Mar 2018 07:01  -  08 Mar 2018 16:27  --------------------------------------------------------  IN: 720 mL / OUT: 0 mL / NET: 720 mL        LABS:                        13.1   9.17  )-----------( 298      ( 08 Mar 2018 07:35 )             39.3     03-08    141  |  101  |  28<H>  ----------------------------<  119<H>  4.0   |  26  |  0.96    Ca    9.1      08 Mar 2018 06:14
MEDICINE, PROGRESS NOTE [phone] covering dr aydin SHEPPARD, [facility] 68y MRN-[id number]    Patient seen and examined.  Patient is a 68y old  Female who presents with a chief complaint of Tachypnea/hypoxia (06 Mar 2018 14:23)  pt feels ok.     PAST MEDICAL & SURGICAL HISTORY:  Hypothyroid  Upper GI bleed  Paranoid schizophrenia  UTI (urinary tract infection)  Alzheimer's dementia  No significant past surgical history    MEDICATIONS  (STANDING):  ALBUTerol/ipratropium for Nebulization 3 milliLiter(s) Nebulizer every 6 hours  cetirizine 5 milliGRAM(s) Oral daily  famotidine    Tablet 20 milliGRAM(s) Oral daily  furosemide    Tablet 20 milliGRAM(s) Oral daily  heparin  Injectable 5000 Unit(s) SubCutaneous every 8 hours  levoFLOXacin  Tablet 500 milliGRAM(s) Oral every 24 hours  levothyroxine 50 MICROGram(s) Oral daily  LORazepam     Tablet 1 milliGRAM(s) Oral at bedtime  risperiDONE   Solution 1 milliGRAM(s) Oral two times a day    MEDICATIONS  (PRN):    Allergies    No Known Allergies    Intolerances        PHYSICAL EXAM:  Constitutional: NAD  HEENT: Normocephalic, EOMI  Neck:  No JVD  Respiratory: CTA B/L, No wheezes  Cardiovascular: S1, S2, RRR, + systolic murmur  Gastrointestinal: BS+, soft, NT/ND  Extremities: No peripheral edema  Neurological: AAOX1, no focal deficits  Psychiatric: Normal mood, normal affect      Vital Signs Last 24 Hrs  T(C): 36.3 (07 Mar 2018 12:18), Max: 36.8 (06 Mar 2018 20:57)  T(F): 97.3 (07 Mar 2018 12:18), Max: 98.2 (06 Mar 2018 20:57)  HR: 92 (07 Mar 2018 12:18) (72 - 92)  BP: 105/71 (07 Mar 2018 12:18) (105/71 - 109/72)  BP(mean): --  RR: 18 (07 Mar 2018 16:00) (17 - 19)  SpO2: 94% (07 Mar 2018 16:00) (84% - 98%)  I&O's Summary    06 Mar 2018 07:01  -  07 Mar 2018 07:00  --------------------------------------------------------  IN: 1070 mL / OUT: 0 mL / NET: 1070 mL    07 Mar 2018 07:01  -  07 Mar 2018 17:12  --------------------------------------------------------  IN: 480 mL / OUT: 0 mL / NET: 480 mL        LABS:                        11.5   11.19 )-----------( 282      ( 06 Mar 2018 07:39 )             36.0     03-06    141  |  102  |  22  ----------------------------<  93  4.0   |  30  |  0.93    Ca    9.0      06 Mar 2018 06:07
MEDICINE, PROGRESS NOTE [phone]    [facility] SHEPPARD 68y MRN-[id number]    Patient seen and examined.  Patient is a 68y old  Female who presents with a chief complaint of Tachypnea/hypoxia (06 Mar 2018 14:23)  pt resting in bed.    PAST MEDICAL & SURGICAL HISTORY:  Hypothyroid  Upper GI bleed  Paranoid schizophrenia  UTI (urinary tract infection)  Alzheimer's dementia  No significant past surgical history    MEDICATIONS  (STANDING):  ALBUTerol/ipratropium for Nebulization 3 milliLiter(s) Nebulizer every 6 hours  cetirizine 5 milliGRAM(s) Oral daily  famotidine    Tablet 20 milliGRAM(s) Oral daily  furosemide    Tablet 20 milliGRAM(s) Oral daily  heparin  Injectable 5000 Unit(s) SubCutaneous every 8 hours  levothyroxine 50 MICROGram(s) Oral daily  risperiDONE   Solution 1 milliGRAM(s) Oral two times a day    MEDICATIONS  (PRN):  QUEtiapine 12.5 milliGRAM(s) Oral every 6 hours PRN anxiety    Allergies    No Known Allergies    Intolerances    sinus , no ectopy    PHYSICAL EXAM:  Constitutional: NAD  HEENT: Normocephalic, EOMI  Neck:  No JVD  Respiratory: CTA B/L, No wheezes  Cardiovascular: S1, S2, RRR, + systolic murmur  Gastrointestinal: BS+, soft, NT/ND  Extremities: No peripheral edema  Neurological: AAOX3, no focal deficits  Psychiatric: Normal mood, normal affect  : No Lopez    Vital Signs Last 24 Hrs  T(C): 36.7 (11 Mar 2018 18:24), Max: 36.7 (11 Mar 2018 05:11)  T(F): 98 (11 Mar 2018 18:24), Max: 98.1 (11 Mar 2018 05:11)  HR: 87 (11 Mar 2018 18:24) (86 - 97)  BP: 110/76 (11 Mar 2018 18:24) (106/65 - 110/76)  BP(mean): --  RR: 18 (11 Mar 2018 18:24) (18 - 18)  SpO2: 92% (11 Mar 2018 18:24) (91% - 97%)  I&O's Summary    10 Mar 2018 06:01  -  11 Mar 2018 07:00  --------------------------------------------------------  IN: 1360 mL / OUT: 0 mL / NET: 1360 mL    11 Mar 2018 07:01  -  11 Mar 2018 18:58  --------------------------------------------------------  IN: 840 mL / OUT: 0 mL / NET: 840 mL        LABS:                        12.1   8.38  )-----------( 264      ( 11 Mar 2018 06:52 )             38.2     03-11    140  |  103  |  24<H>  ----------------------------<  124<H>  3.9   |  24  |  1.12    Ca    9.1      11 Mar 2018 05:32
MEDICINE, PROGRESS NOTE [phone]    [facility] SHEPPARD 68y MRN-[id number]    Patient seen and examined.  Patient is a 68y old  Female who presents with a chief complaint of Tachypnea/hypoxia (06 Mar 2018 14:23)  pt resting comfortably.    PAST MEDICAL & SURGICAL HISTORY:  Hypothyroid  Upper GI bleed  Paranoid schizophrenia  UTI (urinary tract infection)  Alzheimer's dementia  No significant past surgical history    MEDICATIONS  (STANDING):  ALBUTerol/ipratropium for Nebulization 3 milliLiter(s) Nebulizer every 6 hours  cetirizine 5 milliGRAM(s) Oral daily  famotidine    Tablet 20 milliGRAM(s) Oral daily  furosemide    Tablet 20 milliGRAM(s) Oral daily  heparin  Injectable 5000 Unit(s) SubCutaneous every 8 hours  levothyroxine 50 MICROGram(s) Oral daily  risperiDONE   Solution 1 milliGRAM(s) Oral two times a day    MEDICATIONS  (PRN):  QUEtiapine 12.5 milliGRAM(s) Oral every 6 hours PRN anxiety    Allergies    No Known Allergies    Intolerances        PHYSICAL EXAM:  Constitutional: NAD  HEENT: Normocephalic, EOMI  Neck:  No JVD  Respiratory: CTA B/L, No wheezes  Cardiovascular: S1, S2, RRR, + systolic murmur  Gastrointestinal: BS+, soft, NT/ND  Extremities: No peripheral edema  Neurological: AAOX3, no focal deficits  Psychiatric: Normal mood, normal affect  : No Lopez    Vital Signs Last 24 Hrs  T(C): 36.7 (10 Mar 2018 12:29), Max: 36.9 (09 Mar 2018 20:40)  T(F): 98.1 (10 Mar 2018 12:29), Max: 98.5 (09 Mar 2018 20:40)  HR: 95 (10 Mar 2018 12:29) (84 - 95)  BP: 126/79 (10 Mar 2018 12:29) (106/68 - 126/79)  BP(mean): --  RR: 19 (10 Mar 2018 12:29) (18 - 19)  SpO2: 90% (10 Mar 2018 12:29) (90% - 90%)  I&O's Summary    09 Mar 2018 07:01  -  10 Mar 2018 07:00  --------------------------------------------------------  IN: 1000 mL / OUT: 0 mL / NET: 1000 mL    10 Mar 2018 06:01  -  10 Mar 2018 14:41  --------------------------------------------------------  IN: 280 mL / OUT: 0 mL / NET: 280 mL        LABS:                        13.4   8.65  )-----------( 284      ( 09 Mar 2018 08:09 )             40.3     03-09    140  |  101  |  20  ----------------------------<  119<H>  3.9   |  24  |  0.96    Ca    9.4      09 Mar 2018 06:31
Patient is a 68y old  Female who presents with a chief complaint of Tachypnea/hypoxia (06 Mar 2018 14:23)      SUBJECTIVE / OVERNIGHT EVENTS:   Feels better.  Denies CP/SOB/Palpitation/HA.    MEDICATIONS  (STANDING):  ALBUTerol/ipratropium for Nebulization 3 milliLiter(s) Nebulizer every 6 hours  cetirizine 5 milliGRAM(s) Oral daily  famotidine    Tablet 20 milliGRAM(s) Oral daily  furosemide    Tablet 20 milliGRAM(s) Oral daily  heparin  Injectable 5000 Unit(s) SubCutaneous every 8 hours  levothyroxine 50 MICROGram(s) Oral daily  risperiDONE   Solution 1 milliGRAM(s) Oral two times a day    MEDICATIONS  (PRN):  QUEtiapine 12.5 milliGRAM(s) Oral every 6 hours PRN anxiety        CAPILLARY BLOOD GLUCOSE        I&O's Summary    11 Mar 2018 07:01  -  12 Mar 2018 07:00  --------------------------------------------------------  IN: 1080 mL / OUT: 0 mL / NET: 1080 mL    12 Mar 2018 07:01  -  12 Mar 2018 21:01  --------------------------------------------------------  IN: 1080 mL / OUT: 0 mL / NET: 1080 mL        PHYSICAL EXAM:  GENERAL: NAD, well-developed  HEAD:  Atraumatic, Normocephalic  NECK: Supple, No JVD  CHEST/LUNG: Clear to auscultation bilaterally; No wheezing.  HEART: Regular rate and rhythm; No murmurs, rubs, or gallops  ABDOMEN: Soft, Nontender, Nondistended; Bowel sounds present  EXTREMITIES:   No clubbing, cyanosis, or edema  NEUROLOGY: AAO X 3  SKIN: No rashes    LABS:                        12.3   8.61  )-----------( 257      ( 12 Mar 2018 07:21 )             39.3     03-12    139  |  102  |  21  ----------------------------<  125<H>  3.9   |  25  |  0.97    Ca    8.9      12 Mar 2018 06:08              CAPILLARY BLOOD GLUCOSE                    RADIOLOGY & ADDITIONAL TESTS:    Imaging Personally Reviewed:    Consultant(s) Notes Reviewed:      Care Discussed with Consultants/Other Providers:
Patient is a 68y old  Female who presents with a chief complaint of Tachypnea/hypoxia (06 Mar 2018 14:23)      SUBJECTIVE / OVERNIGHT EVENTS:   Feels better.  Denies CP/SOB/Palpitation/HA.    MEDICATIONS  (STANDING):  ALBUTerol/ipratropium for Nebulization 3 milliLiter(s) Nebulizer every 6 hours  cetirizine 5 milliGRAM(s) Oral daily  famotidine   Suspension 20 milliGRAM(s) Oral daily  heparin  Injectable 5000 Unit(s) SubCutaneous every 8 hours  levoFLOXacin  Tablet 500 milliGRAM(s) Oral every 24 hours  levothyroxine 50 MICROGram(s) Oral daily  LORazepam     Tablet 1 milliGRAM(s) Oral at bedtime  risperiDONE   Solution 1 milliGRAM(s) Oral two times a day    MEDICATIONS  (PRN):        CAPILLARY BLOOD GLUCOSE        I&O's Summary    05 Mar 2018 07:01  -  06 Mar 2018 07:00  --------------------------------------------------------  IN: 350 mL / OUT: 350 mL / NET: 0 mL    06 Mar 2018 07:01  -  06 Mar 2018 14:38  --------------------------------------------------------  IN: 360 mL / OUT: 0 mL / NET: 360 mL        PHYSICAL EXAM:  GENERAL: NAD, well-developed  HEAD:  Atraumatic, Normocephalic  NECK: Supple, No JVD  CHEST/LUNG: Clear to auscultation bilaterally; No wheezing.  HEART: Regular rate and rhythm; No murmurs, rubs, or gallops  ABDOMEN: Soft, Nontender, Nondistended; Bowel sounds present  EXTREMITIES:   No clubbing, cyanosis, or edema  NEUROLOGY: AAO X 3  SKIN: No rashes    LABS:                        11.5   11.19 )-----------( 282      ( 06 Mar 2018 07:39 )             36.0     03-06    141  |  102  |  22  ----------------------------<  93  4.0   |  30  |  0.93    Ca    9.0      06 Mar 2018 06:07            Urinalysis Basic - ( 05 Mar 2018 15:43 )    Color: Yellow / Appearance: Clear / S.012 / pH: x  Gluc: x / Ketone: Negative  / Bili: Negative / Urobili: Negative mg/dL   Blood: x / Protein: Negative mg/dL / Nitrite: Negative   Leuk Esterase: Moderate / RBC: 0 /HPF / WBC 10 /HPF   Sq Epi: x / Non Sq Epi: 1 /HPF / Bacteria: Many      CAPILLARY BLOOD GLUCOSE         @ 20:57  Culture-urine --  Culture results   >100,000 CFU/ml Proteus mirabilis  method type CORINE  Organism Proteus mirabilis  Organism Identification Proteus mirabilis  Specimen source .Urine Clean Catch (Midstream)   @ 17:15  Culture-urine --  Culture results   No growth at 5 days.  method type --  Organism --  Organism Identification --  Specimen source .Blood Blood            @ 20:57  Culture blood --  Culture results   >100,000 CFU/ml Proteus mirabilis  Gram stain --  Gram stain blood --  Method type CORINE  Organism Proteus mirabilis  Organism identification Proteus mirabilis  Specimen source .Urine Clean Catch (Midstream)    @ 17:15  Culture blood --  Culture results   No growth at 5 days.  Gram stain --  Gram stain blood --  Method type --  Organism --  Organism identification --  Specimen source .Blood Blood      RADIOLOGY & ADDITIONAL TESTS:    Imaging Personally Reviewed:    Consultant(s) Notes Reviewed:      Care Discussed with Consultants/Other Providers:
Patient is a 68y old  Female who presents with a chief complaint of Tachypnea/hypoxia (2018 16:59)      SUBJECTIVE / OVERNIGHT EVENTS:  Mild SOB  No CP/N/V.      MEDICATIONS  (STANDING):  ALBUTerol/ipratropium for Nebulization 3 milliLiter(s) Nebulizer every 6 hours  cetirizine 5 milliGRAM(s) Oral daily  famotidine   Suspension 20 milliGRAM(s) Oral daily  heparin  Injectable 5000 Unit(s) SubCutaneous every 8 hours  levothyroxine 50 MICROGram(s) Oral daily  LORazepam     Tablet 1 milliGRAM(s) Oral at bedtime  predniSONE   Tablet 40 milliGRAM(s) Oral daily  risperiDONE   Solution 1 milliGRAM(s) Oral two times a day    MEDICATIONS  (PRN):        CAPILLARY BLOOD GLUCOSE        I&O's Summary    2018 07:  -  01 Mar 2018 07:00  --------------------------------------------------------  IN: 240 mL / OUT: 0 mL / NET: 240 mL    01 Mar 2018 07:  -  01 Mar 2018 22:22  --------------------------------------------------------  IN: 1260 mL / OUT: 0 mL / NET: 1260 mL        PHYSICAL EXAM:    NECK: Supple, No JVD  CHEST/LUNG: Clear to auscultation bilaterally; No wheezing.  HEART: Regular rate and rhythm; No murmurs, rubs, or gallops  ABDOMEN: Soft, Nontender, Nondistended; Bowel sounds present  EXTREMITIES:   No clubbing, cyanosis, or edema  NEUROLOGY: Awake.  SKIN: No rashes    LABS:                        11.4   16.47 )-----------( 263      ( 01 Mar 2018 07:32 )             34.1     -    138  |  105  |  20  ----------------------------<  160<H>  4.2   |  22  |  0.77    Ca    8.9      01 Mar 2018 05:28    TPro  6.8  /  Alb  3.6  /  TBili  0.3  /  DBili  x   /  AST  18  /  ALT  25  /  AlkPhos  83      PT/INR - ( 2018 14:50 )   PT: 10.5 sec;   INR: 0.97 ratio         PTT - ( 2018 14:50 )  PTT:26.2 sec  CARDIAC MARKERS ( 2018 14:50 )  x     / <0.01 ng/mL / 45 U/L / x     / 2.1 ng/mL      Urinalysis Basic - ( 2018 17:20 )    Color: PL Yellow / Appearance: Clear / S.016 / pH: x  Gluc: x / Ketone: Negative  / Bili: Negative / Urobili: Negative   Blood: x / Protein: Negative / Nitrite: Negative   Leuk Esterase: Moderate / RBC: 3-5 /HPF / WBC 26-50 /HPF   Sq Epi: x / Non Sq Epi: x / Bacteria: x      CAPILLARY BLOOD GLUCOSE         @ 20:57  Culture-urine --  Culture results   >100,000 CFU/ml Proteus mirabilis  method type --  Organism --  Organism Identification --  Specimen source .Urine Clean Catch (Midstream)   @ 17:15  Culture-urine --  Culture results   No growth to date.  method type --  Organism --  Organism Identification --  Specimen source .Blood Blood            @ 20:57  Culture blood --  Culture results   >100,000 CFU/ml Proteus mirabilis  Gram stain --  Gram stain blood --  Method type --  Organism --  Organism identification --  Specimen source .Urine Clean Catch (Midstream)    @ 17:15  Culture blood --  Culture results   No growth to date.  Gram stain --  Gram stain blood --  Method type --  Organism --  Organism identification --  Specimen source .Blood Blood      RADIOLOGY & ADDITIONAL TESTS:    Imaging Personally Reviewed:    Consultant(s) Notes Reviewed:      Care Discussed with Consultants/Other Providers:
Patient is a 68y old  Female who presents with a chief complaint of Tachypnea/hypoxia (28 Feb 2018 16:59)      SUBJECTIVE / OVERNIGHT EVENTS:   Feels better.  Denies CP/SOB/Palpitation/HA.    MEDICATIONS  (STANDING):  ALBUTerol/ipratropium for Nebulization 3 milliLiter(s) Nebulizer every 6 hours  cetirizine 5 milliGRAM(s) Oral daily  famotidine   Suspension 20 milliGRAM(s) Oral daily  heparin  Injectable 5000 Unit(s) SubCutaneous every 8 hours  levothyroxine 50 MICROGram(s) Oral daily  LORazepam     Tablet 1 milliGRAM(s) Oral at bedtime  predniSONE   Tablet 40 milliGRAM(s) Oral daily  risperiDONE   Solution 1 milliGRAM(s) Oral two times a day    MEDICATIONS  (PRN):        CAPILLARY BLOOD GLUCOSE        I&O's Summary    04 Mar 2018 07:01  -  05 Mar 2018 07:00  --------------------------------------------------------  IN: 1420 mL / OUT: 0 mL / NET: 1420 mL    05 Mar 2018 07:01  -  05 Mar 2018 15:39  --------------------------------------------------------  IN: 300 mL / OUT: 350 mL / NET: -50 mL        PHYSICAL EXAM:  GENERAL: NAD, well-developed  HEAD:  Atraumatic, Normocephalic  NECK: Supple, No JVD  CHEST/LUNG: Clear to auscultation bilaterally; No wheezing.  HEART: Regular rate and rhythm; No murmurs, rubs, or gallops  ABDOMEN: Soft, Nontender, Nondistended; Bowel sounds present  EXTREMITIES:   No clubbing, cyanosis, or edema  NEUROLOGY: AAO X 3  SKIN: No rashes    LABS:                        11.3   11.92 )-----------( 290      ( 05 Mar 2018 06:39 )             35.7     03-05    142  |  104  |  22  ----------------------------<  90  4.0   |  25  |  0.87    Ca    8.9      05 Mar 2018 06:07              CAPILLARY BLOOD GLUCOSE        02-28 @ 20:57  Culture-urine --  Culture results   >100,000 CFU/ml Proteus mirabilis  method type CORINE  Organism Proteus mirabilis  Organism Identification Proteus mirabilis  Specimen source .Urine Clean Catch (Midstream)  02-28 @ 17:15  Culture-urine --  Culture results   No growth to date.  method type --  Organism --  Organism Identification --  Specimen source .Blood Blood           02-28 @ 20:57  Culture blood --  Culture results   >100,000 CFU/ml Proteus mirabilis  Gram stain --  Gram stain blood --  Method type CORINE  Organism Proteus mirabilis  Organism identification Proteus mirabilis  Specimen source .Urine Clean Catch (Midstream)   02-28 @ 17:15  Culture blood --  Culture results   No growth to date.  Gram stain --  Gram stain blood --  Method type --  Organism --  Organism identification --  Specimen source .Blood Blood      RADIOLOGY & ADDITIONAL TESTS:    Imaging Personally Reviewed:    Consultant(s) Notes Reviewed:      Care Discussed with Consultants/Other Providers:
Patient is a 68y old  Female who presents with a chief complaint of Tachypnea/hypoxia (28 Feb 2018 16:59)      SUBJECTIVE / OVERNIGHT EVENTS:  No new symptoms  Review of Systems:   CONSTITUTIONAL: No fever, weight loss, or fatigue  EYES: No eye pain, visual disturbances, or discharge  ENMT:  No difficulty hearing, tinnitus, vertigo; No sinus or throat pain  NECK: No pain or stiffness  BREASTS: No pain, masses, or nipple discharge  RESPIRATORY: No cough, wheezing, chills or hemoptysis; No shortness of breath  CARDIOVASCULAR: No chest pain, palpitations, dizziness, or leg swelling  GASTROINTESTINAL: No abdominal or epigastric pain. No nausea, vomiting, or hematemesis; No diarrhea or constipation. No melena or hematochezia.  GENITOURINARY: No dysuria, frequency, hematuria, or incontinence  NEUROLOGICAL: No headaches, memory loss, loss of strength, numbness, or tremors  SKIN: No itching, burning, rashes, or lesions   LYMPH NODES: No enlarged glands  ENDOCRINE: No heat or cold intolerance; No hair loss  MUSCULOSKELETAL: No joint pain or swelling; No muscle, back, or extremity pain  PSYCHIATRIC: No depression, anxiety, mood swings, or difficulty sleeping  HEME/LYMPH: No easy bruising, or bleeding gums  ALLERY AND IMMUNOLOGIC: No hives or eczema    MEDICATIONS  (STANDING):  ALBUTerol/ipratropium for Nebulization 3 milliLiter(s) Nebulizer every 6 hours  cetirizine 5 milliGRAM(s) Oral daily  famotidine   Suspension 20 milliGRAM(s) Oral daily  heparin  Injectable 5000 Unit(s) SubCutaneous every 8 hours  levothyroxine 50 MICROGram(s) Oral daily  LORazepam     Tablet 1 milliGRAM(s) Oral at bedtime  predniSONE   Tablet 40 milliGRAM(s) Oral daily  risperiDONE   Solution 1 milliGRAM(s) Oral two times a day    MEDICATIONS  (PRN):      PHYSICAL EXAM:  Vital Signs Last 24 Hrs  T(C): 37.1 (04 Mar 2018 10:54), Max: 37.1 (04 Mar 2018 10:54)  T(F): 98.7 (04 Mar 2018 10:54), Max: 98.7 (04 Mar 2018 10:54)  HR: 92 (04 Mar 2018 10:54) (65 - 92)  BP: 120/75 (04 Mar 2018 10:54) (97/61 - 128/82)  BP(mean): --  RR: 16 (04 Mar 2018 10:54) (16 - 18)  SpO2: 97% (04 Mar 2018 10:54) (95% - 97%)  I&O's Summary    03 Mar 2018 07:01  -  04 Mar 2018 07:00  --------------------------------------------------------  IN: 1060 mL / OUT: 0 mL / NET: 1060 mL    04 Mar 2018 07:01  -  04 Mar 2018 14:37  --------------------------------------------------------  IN: 1000 mL / OUT: 0 mL / NET: 1000 mL      GENERAL: NAD, well-developed  HEAD:  Atraumatic, Normocephalic  EYES: EOMI, PERRLA, conjunctiva and sclera clear  NECK: Supple, No JVD  CHEST/LUNG: Clear to auscultation bilaterally; No wheeze  HEART: Regular rate and rhythm; No murmurs, rubs, or gallops  ABDOMEN: Soft, Nontender, Nondistended; Bowel sounds present  EXTREMITIES:  2+ Peripheral Pulses, No clubbing, cyanosis, or edema  PSYCH: AAOx3  NEUROLOGY: non-focal  SKIN: No rashes or lesions    LABS:  CAPILLARY BLOOD GLUCOSE                              12.9   14.31 )-----------( 291      ( 04 Mar 2018 08:25 )             39.2     03-04    142  |  104  |  24<H>  ----------------------------<  85  4.3   |  27  |  0.90    Ca    9.2      04 Mar 2018 06:15                RADIOLOGY & ADDITIONAL TESTS:    Imaging Personally Reviewed:    Consultant(s) Notes Reviewed:      Care Discussed with Consultants/Other Providers:
Patient is a 68y old  Female who presents with a chief complaint of Tachypnea/hypoxia (28 Feb 2018 16:59)      SUBJECTIVE / OVERNIGHT EVENTS:  SOB stable  Review of Systems:   CONSTITUTIONAL: No fever, weight loss, or fatigue  EYES: No eye pain, visual disturbances, or discharge  ENMT:  No difficulty hearing, tinnitus, vertigo; No sinus or throat pain  NECK: No pain or stiffness  BREASTS: No pain, masses, or nipple discharge  RESPIRATORY: No cough, wheezing, chills or hemoptysis; No shortness of breath  CARDIOVASCULAR: No chest pain, palpitations, dizziness, or leg swelling  GASTROINTESTINAL: No abdominal or epigastric pain. No nausea, vomiting, or hematemesis; No diarrhea or constipation. No melena or hematochezia.  GENITOURINARY: No dysuria, frequency, hematuria, or incontinence  NEUROLOGICAL: No headaches, memory loss, loss of strength, numbness, or tremors  SKIN: No itching, burning, rashes, or lesions   LYMPH NODES: No enlarged glands  ENDOCRINE: No heat or cold intolerance; No hair loss  MUSCULOSKELETAL: No joint pain or swelling; No muscle, back, or extremity pain  PSYCHIATRIC: No depression, anxiety, mood swings, or difficulty sleeping  HEME/LYMPH: No easy bruising, or bleeding gums  ALLERY AND IMMUNOLOGIC: No hives or eczema    MEDICATIONS  (STANDING):  ALBUTerol/ipratropium for Nebulization 3 milliLiter(s) Nebulizer every 6 hours  cetirizine 5 milliGRAM(s) Oral daily  famotidine   Suspension 20 milliGRAM(s) Oral daily  heparin  Injectable 5000 Unit(s) SubCutaneous every 8 hours  levothyroxine 50 MICROGram(s) Oral daily  LORazepam     Tablet 1 milliGRAM(s) Oral at bedtime  predniSONE   Tablet 40 milliGRAM(s) Oral daily  risperiDONE   Solution 1 milliGRAM(s) Oral two times a day    MEDICATIONS  (PRN):      PHYSICAL EXAM:  Vital Signs Last 24 Hrs  T(C): 36.8 (03 Mar 2018 11:23), Max: 36.8 (03 Mar 2018 11:23)  T(F): 98.3 (03 Mar 2018 11:23), Max: 98.3 (03 Mar 2018 11:23)  HR: 73 (03 Mar 2018 11:23) (73 - 78)  BP: 104/69 (03 Mar 2018 11:23) (102/62 - 104/69)  BP(mean): --  RR: 18 (03 Mar 2018 11:23) (18 - 18)  SpO2: 96% (03 Mar 2018 11:23) (96% - 97%)  I&O's Summary    02 Mar 2018 07:01  -  03 Mar 2018 07:00  --------------------------------------------------------  IN: 1220 mL / OUT: 0 mL / NET: 1220 mL    03 Mar 2018 07:01  -  03 Mar 2018 21:09  --------------------------------------------------------  IN: 1060 mL / OUT: 0 mL / NET: 1060 mL      GENERAL: NAD, well-developed  HEAD:  Atraumatic, Normocephalic  EYES: EOMI, PERRLA, conjunctiva and sclera clear  NECK: Supple, No JVD  CHEST/LUNG: Clear to auscultation bilaterally; No wheeze  HEART: Regular rate and rhythm; No murmurs, rubs, or gallops  ABDOMEN: Soft, Nontender, Nondistended; Bowel sounds present  EXTREMITIES:  2+ Peripheral Pulses, No clubbing, cyanosis, or edema  PSYCH: AAOx3  NEUROLOGY: non-focal  SKIN: No rashes or lesions    LABS:  CAPILLARY BLOOD GLUCOSE                              11.5   11.45 )-----------( 273      ( 03 Mar 2018 07:43 )             35.5     03-03    143  |  106  |  26<H>  ----------------------------<  93  3.9   |  24  |  0.90    Ca    8.7      03 Mar 2018 05:17                RADIOLOGY & ADDITIONAL TESTS:    Imaging Personally Reviewed:    Consultant(s) Notes Reviewed:    Schizophrenia with anxiety  No clinical suspicion of pulmonary infection  Leukocytosis with Proteus Bacteuria  Dementia    REC    Contunue nebulizers  No need for abx for lung  CHeck RA sats  Care Discussed with Consultants/Other Providers:
[facility]        - Patient seen and examined.  - Today, patient is without complaints.          *****MEDICATIONS: Current medication reviewed and documented.    MEDICATIONS  (STANDING):  ALBUTerol/ipratropium for Nebulization 3 milliLiter(s) Nebulizer every 6 hours  cetirizine 5 milliGRAM(s) Oral daily  famotidine    Tablet 20 milliGRAM(s) Oral daily  furosemide    Tablet 20 milliGRAM(s) Oral daily  heparin  Injectable 5000 Unit(s) SubCutaneous every 8 hours  levothyroxine 50 MICROGram(s) Oral daily  risperiDONE   Solution 1 milliGRAM(s) Oral two times a day    MEDICATIONS  (PRN):  QUEtiapine 12.5 milliGRAM(s) Oral every 6 hours PRN anxiety           ***** REVIEW OF SYSTEM:  GEN: no fever, no chills, no pain  RESP: no SOB, no cough, no sputum  CVS: no chest pain, no palpitations, no edema  GI: no abdominal pain, no nausea, no vomiting, no constipation, no diarrhea  : no dysurea, no frequency  NEURO: no headache, no diziness  PSYCH: no depression, not anxious  Derm : no itching, no rash         ***** VITAL SIGNS:  T(F): 97.8 (18 @ 13:00), Max: 98.9 (18 @ 21:01)  HR: 84 (18 @ 13:00) (79 - 89)  BP: 124/80 (18 @ 13:00) (108/68 - 124/80)  RR: 20 (18 @ 13:00) (18 - 60)  SpO2: 93% (18 @ 13:00) (86% - 94%)  Wt(kg): --  ,   I&O's Summary    11 Mar 2018 07:  -  12 Mar 2018 07:00  --------------------------------------------------------  IN: 1080 mL / OUT: 0 mL / NET: 1080 mL    12 Mar 2018 07:  -  12 Mar 2018 15:58  --------------------------------------------------------  IN: 1080 mL / OUT: 0 mL / NET: 1080 mL             *****PHYSICAL EXAM:   alert oriented x 3   attention comprehension are fair.  Able to name, repeat. recall 0/5 poor registration.  EOmi fundi not visualized   no nystagmus VFF to confrontation  Tongue is midline  Palate elevates symmetrically   Moving all 4 ext spontaneously no drift appreciated    Gait not assessed.            *****LAB AND IMAGIN.3   8.61  )-----------( 257      ( 12 Mar 2018 07:21 )             39.3               03-12    139  |  102  |  21  ----------------------------<  125<H>  3.9   |  25  |  0.97    Ca    8.9      12 Mar 2018 06:08                           [All pertinent recent Imaging/Reports reviewed]           *****A S S E S S M E N T   A N D   P L A N :      This is a 68yr old female with  amnesia, paranoid schizophrenia, hypothyroidism, PVD, who was sent from [facility] for tachypnea and hypoxia. No documented SpO2 from transfer note. On arrival, pt noted to be breathing at 38-40s, tachycardic to 100s, mild hypoxia 90% on RA. Pt herself denies any symptoms - denies fever/chills, chest pain, sob, abdominal pain, palpitation, n/v/c/d. Of note, pt was recently hospitalized - for RSV bronchitis s/p steroid treatment.   improving mental status   recall poor purely amnestic disorder  will need a neuropsychological evaluation as outpt to further delineate the memory disorder.   would regulate sleep cycle   avoid day time sleepiness.   would get psychiatry for behavior management.     Thank you for allowing me to participate in the care of this patient. Please do not hesitate to call me if you have any  questions.        ________________  Ly Ortega MD  [facility] ([facility]  [phone]     30 minutes spent on total encounter; more than 50 % of the visit was  spent counseling and or  coordinating care by the attending physician.   At the present time, [facility] does not  provide outpatient followup, best to call the your insurance to find a participating provider.  This was explained to you at the time of the visit. Alternatively, if your insurance allows it, you can follow up with a neurologist  Dr. Misael Rebolledo [phone].

## 2018-12-15 NOTE — PROVIDER CONTACT NOTE (CRITICAL VALUE NOTIFICATION) - PERSON GIVING RESULT:
Follow-up Pulm Progress Note    Enrique Simmons MD  [phone]    No new respiratory events overnight.  Denies SOB/CP.  Pt now npo.  Family aware.    Medications:  Vital Signs Last 24 Hrs  T(C): 36.2 (15 Dec 2018 05:45), Max: 36.4 (14 Dec 2018 21:45)  T(F): 97.2 (15 Dec 2018 05:45), Max: 97.5 (14 Dec 2018 21:45)  HR: 61 (15 Dec 2018 09:20) (60 - 65)  BP: 129/79 (15 Dec 2018 05:45) (113/57 - 144/82)  BP(mean): --  RR: 14 (15 Dec 2018 05:45) (14 - 15)  SpO2: 100% (15 Dec 2018 09:20) (97% - 100%)    ABG - ( 14 Dec 2018 06:59 )  pH, Arterial: 7.30  pH, Blood: x     /  pCO2: 36    /  pO2: 98    / HCO3: x     / Base Excess: x     /  SaO2: 97                    12-14 @ 07:01  -  12-15 @ 07:00  --------------------------------------------------------  IN: 2820 mL / OUT: 0 mL / NET: 2820 mL        LABS:                        8.6    6.4   )-----------( 68       ( 15 Dec 2018 07:15 )             27.1     12-15    143  |  114<H>  |  29<H>  ----------------------------<  88  4.0   |  21<L>  |  1.54<H>    Ca    8.5      15 Dec 2018 07:15  Phos  2.2     12-15  Mg     1.9     12-15    TPro  6.1  /  Alb  2.2<L>  /  TBili  0.1<L>  /  DBili  x   /  AST  12  /  ALT  11  /  AlkPhos  68  12-14        Procalcitonin, Serum: 0.08 ng/mL (12-14-18 @ 05:45)        CULTURES:  Culture Results:   No growth (12-13 @ 06:25)  Culture Results:   No growth to date. (12-13 @ 03:00)  Culture Results:   No growth to date. (12-13 @ 03:00)    Most recent blood culture -- 12-13 @ 06:25   -- -- .Urine Clean Catch (Midstream) 12-13 @ 06:25  Most recent blood culture -- 12-13 @ 03:00   -- -- .Blood Blood-Peripheral 12-13 @ 03:00      Physical Examination:  PULM: Scattered coarse bs. Improved  CVS: Regular rate and rhythm, no murmurs, rubs, or gallops  ABD: Soft, non-tender  EXT:  No clubbing, cyanosis, or edema NS Lab Edmundo Suarez

## 2019-01-15 ENCOUNTER — EMERGENCY (EMERGENCY)
Facility: HOSPITAL | Age: 69
LOS: 1 days | Discharge: HOME | End: 2019-01-15
Attending: EMERGENCY MEDICINE
Payer: MEDICARE

## 2019-01-15 VITALS
DIASTOLIC BLOOD PRESSURE: 83 MMHG | TEMPERATURE: 99 F | SYSTOLIC BLOOD PRESSURE: 126 MMHG | RESPIRATION RATE: 18 BRPM | WEIGHT: 145.06 LBS | HEART RATE: 87 BPM | OXYGEN SATURATION: 93 % | HEIGHT: 65 IN

## 2019-01-15 VITALS
OXYGEN SATURATION: 93 % | TEMPERATURE: 98 F | HEART RATE: 78 BPM | RESPIRATION RATE: 18 BRPM | DIASTOLIC BLOOD PRESSURE: 80 MMHG | SYSTOLIC BLOOD PRESSURE: 126 MMHG

## 2019-01-15 PROBLEM — E03.9 HYPOTHYROIDISM, UNSPECIFIED: Chronic | Status: ACTIVE | Noted: 2018-02-16

## 2019-01-15 LAB
ALBUMIN SERPL ELPH-MCNC: 4 G/DL — SIGNIFICANT CHANGE UP (ref 3.3–5)
ALP SERPL-CCNC: 98 U/L — SIGNIFICANT CHANGE UP (ref 40–120)
ALT FLD-CCNC: 22 U/L — SIGNIFICANT CHANGE UP (ref 10–45)
ANION GAP SERPL CALC-SCNC: 14 MMOL/L — SIGNIFICANT CHANGE UP (ref 5–17)
APPEARANCE UR: CLEAR — SIGNIFICANT CHANGE UP
AST SERPL-CCNC: 18 U/L — SIGNIFICANT CHANGE UP (ref 10–40)
BACTERIA # UR AUTO: NEGATIVE — SIGNIFICANT CHANGE UP
BASOPHILS # BLD AUTO: 0 K/UL — SIGNIFICANT CHANGE UP (ref 0–0.2)
BASOPHILS NFR BLD AUTO: 0.3 % — SIGNIFICANT CHANGE UP (ref 0–2)
BILIRUB SERPL-MCNC: 0.1 MG/DL — LOW (ref 0.2–1.2)
BILIRUB UR-MCNC: NEGATIVE — SIGNIFICANT CHANGE UP
BUN SERPL-MCNC: 18 MG/DL — SIGNIFICANT CHANGE UP (ref 7–23)
CALCIUM SERPL-MCNC: 9 MG/DL — SIGNIFICANT CHANGE UP (ref 8.4–10.5)
CHLORIDE SERPL-SCNC: 106 MMOL/L — SIGNIFICANT CHANGE UP (ref 96–108)
CO2 SERPL-SCNC: 20 MMOL/L — LOW (ref 22–31)
COLOR SPEC: SIGNIFICANT CHANGE UP
CREAT SERPL-MCNC: 0.86 MG/DL — SIGNIFICANT CHANGE UP (ref 0.5–1.3)
DIFF PNL FLD: NEGATIVE — SIGNIFICANT CHANGE UP
EOSINOPHIL # BLD AUTO: 0.1 K/UL — SIGNIFICANT CHANGE UP (ref 0–0.5)
EOSINOPHIL NFR BLD AUTO: 1.2 % — SIGNIFICANT CHANGE UP (ref 0–6)
EPI CELLS # UR: 1 /HPF — SIGNIFICANT CHANGE UP
GLUCOSE SERPL-MCNC: 171 MG/DL — HIGH (ref 70–99)
GLUCOSE UR QL: NEGATIVE — SIGNIFICANT CHANGE UP
HCT VFR BLD CALC: 40.4 % — SIGNIFICANT CHANGE UP (ref 34.5–45)
HGB BLD-MCNC: 13.6 G/DL — SIGNIFICANT CHANGE UP (ref 11.5–15.5)
HYALINE CASTS # UR AUTO: 1 /LPF — SIGNIFICANT CHANGE UP (ref 0–2)
KETONES UR-MCNC: NEGATIVE — SIGNIFICANT CHANGE UP
LEUKOCYTE ESTERASE UR-ACNC: NEGATIVE — SIGNIFICANT CHANGE UP
LYMPHOCYTES # BLD AUTO: 1.4 K/UL — SIGNIFICANT CHANGE UP (ref 1–3.3)
LYMPHOCYTES # BLD AUTO: 15.8 % — SIGNIFICANT CHANGE UP (ref 13–44)
MCHC RBC-ENTMCNC: 29.9 PG — SIGNIFICANT CHANGE UP (ref 27–34)
MCHC RBC-ENTMCNC: 33.6 GM/DL — SIGNIFICANT CHANGE UP (ref 32–36)
MCV RBC AUTO: 88.8 FL — SIGNIFICANT CHANGE UP (ref 80–100)
MONOCYTES # BLD AUTO: 0.8 K/UL — SIGNIFICANT CHANGE UP (ref 0–0.9)
MONOCYTES NFR BLD AUTO: 9 % — SIGNIFICANT CHANGE UP (ref 2–14)
NEUTROPHILS # BLD AUTO: 6.5 K/UL — SIGNIFICANT CHANGE UP (ref 1.8–7.4)
NEUTROPHILS NFR BLD AUTO: 73.7 % — SIGNIFICANT CHANGE UP (ref 43–77)
NITRITE UR-MCNC: NEGATIVE — SIGNIFICANT CHANGE UP
PH UR: 5.5 — SIGNIFICANT CHANGE UP (ref 5–8)
PLATELET # BLD AUTO: 295 K/UL — SIGNIFICANT CHANGE UP (ref 150–400)
POTASSIUM SERPL-MCNC: 3.9 MMOL/L — SIGNIFICANT CHANGE UP (ref 3.5–5.3)
POTASSIUM SERPL-SCNC: 3.9 MMOL/L — SIGNIFICANT CHANGE UP (ref 3.5–5.3)
PROT SERPL-MCNC: 6.6 G/DL — SIGNIFICANT CHANGE UP (ref 6–8.3)
PROT UR-MCNC: SIGNIFICANT CHANGE UP
RBC # BLD: 4.55 M/UL — SIGNIFICANT CHANGE UP (ref 3.8–5.2)
RBC # FLD: 13.5 % — SIGNIFICANT CHANGE UP (ref 10.3–14.5)
RBC CASTS # UR COMP ASSIST: 2 /HPF — SIGNIFICANT CHANGE UP (ref 0–4)
SODIUM SERPL-SCNC: 140 MMOL/L — SIGNIFICANT CHANGE UP (ref 135–145)
SP GR SPEC: 1.02 — SIGNIFICANT CHANGE UP (ref 1.01–1.02)
UROBILINOGEN FLD QL: NEGATIVE — SIGNIFICANT CHANGE UP
WBC # BLD: 8.9 K/UL — SIGNIFICANT CHANGE UP (ref 3.8–10.5)
WBC # FLD AUTO: 8.9 K/UL — SIGNIFICANT CHANGE UP (ref 3.8–10.5)
WBC UR QL: 2 /HPF — SIGNIFICANT CHANGE UP (ref 0–5)

## 2019-01-15 PROCEDURE — 85027 COMPLETE CBC AUTOMATED: CPT

## 2019-01-15 PROCEDURE — 80053 COMPREHEN METABOLIC PANEL: CPT

## 2019-01-15 PROCEDURE — 87186 SC STD MICRODIL/AGAR DIL: CPT

## 2019-01-15 PROCEDURE — 87086 URINE CULTURE/COLONY COUNT: CPT

## 2019-01-15 PROCEDURE — 81001 URINALYSIS AUTO W/SCOPE: CPT

## 2019-01-15 PROCEDURE — 71046 X-RAY EXAM CHEST 2 VIEWS: CPT | Mod: 26

## 2019-01-15 PROCEDURE — 99284 EMERGENCY DEPT VISIT MOD MDM: CPT

## 2019-01-15 PROCEDURE — 99283 EMERGENCY DEPT VISIT LOW MDM: CPT | Mod: 25

## 2019-01-15 PROCEDURE — 71046 X-RAY EXAM CHEST 2 VIEWS: CPT

## 2019-01-15 RX ORDER — SODIUM CHLORIDE 9 MG/ML
1000 INJECTION INTRAMUSCULAR; INTRAVENOUS; SUBCUTANEOUS ONCE
Qty: 0 | Refills: 0 | Status: COMPLETED | OUTPATIENT
Start: 2019-01-15 | End: 2019-01-15

## 2019-01-15 RX ADMIN — SODIUM CHLORIDE 2000 MILLILITER(S): 9 INJECTION INTRAMUSCULAR; INTRAVENOUS; SUBCUTANEOUS at 14:28

## 2019-01-15 NOTE — ED ADULT NURSE NOTE - OBJECTIVE STATEMENT
67 yo female with PMH paranoid schizophrenia, dementia, tardive dyskinesia, hypothyroidism sent to ED by the provider at assisted living for episode of choking, per EMS patient was eating, began to choke/cough up food, so patient was sent to "rule out aspiration pneumonia." Patient states that she feels fine and that nothing happened. Patient is grinding her teeth. She is also wheezing. She denies chest pain, shortness of breath, abdominal pain, fever, cough, abdominal pain, and dysuria. Pt ambulatory with steady gait

## 2019-01-15 NOTE — ED PROVIDER NOTE - OBJECTIVE STATEMENT
68 year old female w/ pmhx paranoid schizophrenia, dementia, tardive dyskinesia, hypothyroidism sent from assisted living for episode of vomiting to rule out aspiration pna. Patient has no complaints at this time.

## 2019-01-15 NOTE — ED PROVIDER NOTE - NSFOLLOWUPINSTRUCTIONS_ED_ALL_ED_FT
1. Follow up with your primary care doctor in the next 1-2 days  2. Rest, increase fluids. Continue all at home medications  3. Return to ED for any new or worsened symptoms

## 2019-01-15 NOTE — ED PROVIDER NOTE - MEDICAL DECISION MAKING DETAILS
68 year old female transferred from assisted living home for choking episode which happened after she ate foot with 1 episode vomiting. H/o alzheimers and paranoid schizophrenia. Patient looks fine, non-toxic, lungs are clear, abd benign. R/o aspiration PNA. Will obtain cxr, basic labs and reeval -ZR

## 2019-01-17 RX ORDER — CEFPODOXIME PROXETIL 100 MG
1 TABLET ORAL
Qty: 14 | Refills: 0
Start: 2019-01-17 | End: 2019-01-23

## 2019-01-17 NOTE — ED POST DISCHARGE NOTE - DETAILS
Spoke with nurse Kin at [facility], pt is doing well and at her baseline.  Informed of test results, called in Cefpodoxime and will follow sensitivities.  Edmundo

## 2019-05-22 ENCOUNTER — EMERGENCY (EMERGENCY)
Facility: HOSPITAL | Age: 69
LOS: 1 days | Discharge: HOME | End: 2019-05-22
Attending: EMERGENCY MEDICINE
Payer: MEDICARE

## 2019-05-22 VITALS
WEIGHT: 195.11 LBS | SYSTOLIC BLOOD PRESSURE: 109 MMHG | OXYGEN SATURATION: 93 % | HEART RATE: 78 BPM | HEIGHT: 67 IN | DIASTOLIC BLOOD PRESSURE: 74 MMHG | RESPIRATION RATE: 20 BRPM

## 2019-05-22 LAB
ALBUMIN SERPL ELPH-MCNC: 4.1 G/DL — SIGNIFICANT CHANGE UP (ref 3.3–5)
ALP SERPL-CCNC: 114 U/L — SIGNIFICANT CHANGE UP (ref 40–120)
ALT FLD-CCNC: 19 U/L — SIGNIFICANT CHANGE UP (ref 10–45)
ANION GAP SERPL CALC-SCNC: 16 MMOL/L — SIGNIFICANT CHANGE UP (ref 5–17)
AST SERPL-CCNC: 16 U/L — SIGNIFICANT CHANGE UP (ref 10–40)
BASE EXCESS BLDV CALC-SCNC: -0.3 MMOL/L — SIGNIFICANT CHANGE UP (ref -2–2)
BASOPHILS # BLD AUTO: 0.1 K/UL — SIGNIFICANT CHANGE UP (ref 0–0.2)
BASOPHILS NFR BLD AUTO: 0.7 % — SIGNIFICANT CHANGE UP (ref 0–2)
BILIRUB SERPL-MCNC: 0.1 MG/DL — LOW (ref 0.2–1.2)
BUN SERPL-MCNC: 25 MG/DL — HIGH (ref 7–23)
CA-I SERPL-SCNC: 1.2 MMOL/L — SIGNIFICANT CHANGE UP (ref 1.12–1.3)
CALCIUM SERPL-MCNC: 9.3 MG/DL — SIGNIFICANT CHANGE UP (ref 8.4–10.5)
CHLORIDE BLDV-SCNC: 107 MMOL/L — SIGNIFICANT CHANGE UP (ref 96–108)
CHLORIDE SERPL-SCNC: 103 MMOL/L — SIGNIFICANT CHANGE UP (ref 96–108)
CO2 BLDV-SCNC: 25 MMOL/L — SIGNIFICANT CHANGE UP (ref 22–30)
CO2 SERPL-SCNC: 21 MMOL/L — LOW (ref 22–31)
CREAT SERPL-MCNC: 0.84 MG/DL — SIGNIFICANT CHANGE UP (ref 0.5–1.3)
EOSINOPHIL # BLD AUTO: 0.2 K/UL — SIGNIFICANT CHANGE UP (ref 0–0.5)
EOSINOPHIL NFR BLD AUTO: 1.5 % — SIGNIFICANT CHANGE UP (ref 0–6)
GAS PNL BLDV: 138 MMOL/L — SIGNIFICANT CHANGE UP (ref 136–145)
GAS PNL BLDV: SIGNIFICANT CHANGE UP
GAS PNL BLDV: SIGNIFICANT CHANGE UP
GLUCOSE BLDV-MCNC: 125 MG/DL — HIGH (ref 70–99)
GLUCOSE SERPL-MCNC: 132 MG/DL — HIGH (ref 70–99)
HCO3 BLDV-SCNC: 24 MMOL/L — SIGNIFICANT CHANGE UP (ref 21–29)
HCT VFR BLD CALC: 41.8 % — SIGNIFICANT CHANGE UP (ref 34.5–45)
HCT VFR BLDA CALC: 42 % — SIGNIFICANT CHANGE UP (ref 39–50)
HGB BLD CALC-MCNC: 13.7 G/DL — SIGNIFICANT CHANGE UP (ref 11.5–15.5)
HGB BLD-MCNC: 13.7 G/DL — SIGNIFICANT CHANGE UP (ref 11.5–15.5)
LACTATE BLDV-MCNC: 1.8 MMOL/L — SIGNIFICANT CHANGE UP (ref 0.7–2)
LYMPHOCYTES # BLD AUTO: 2.2 K/UL — SIGNIFICANT CHANGE UP (ref 1–3.3)
LYMPHOCYTES # BLD AUTO: 21 % — SIGNIFICANT CHANGE UP (ref 13–44)
MCHC RBC-ENTMCNC: 28.6 PG — SIGNIFICANT CHANGE UP (ref 27–34)
MCHC RBC-ENTMCNC: 32.9 GM/DL — SIGNIFICANT CHANGE UP (ref 32–36)
MCV RBC AUTO: 87 FL — SIGNIFICANT CHANGE UP (ref 80–100)
MONOCYTES # BLD AUTO: 0.8 K/UL — SIGNIFICANT CHANGE UP (ref 0–0.9)
MONOCYTES NFR BLD AUTO: 7.8 % — SIGNIFICANT CHANGE UP (ref 2–14)
NEUTROPHILS # BLD AUTO: 7.4 K/UL — SIGNIFICANT CHANGE UP (ref 1.8–7.4)
NEUTROPHILS NFR BLD AUTO: 68.9 % — SIGNIFICANT CHANGE UP (ref 43–77)
OTHER CELLS CSF MANUAL: 16 ML/DL — LOW (ref 18–22)
PCO2 BLDV: 40 MMHG — SIGNIFICANT CHANGE UP (ref 35–50)
PH BLDV: 7.39 — SIGNIFICANT CHANGE UP (ref 7.35–7.45)
PLATELET # BLD AUTO: 303 K/UL — SIGNIFICANT CHANGE UP (ref 150–400)
PO2 BLDV: 60 MMHG — HIGH (ref 25–45)
POTASSIUM BLDV-SCNC: 3.7 MMOL/L — SIGNIFICANT CHANGE UP (ref 3.5–5.3)
POTASSIUM SERPL-MCNC: 3.9 MMOL/L — SIGNIFICANT CHANGE UP (ref 3.5–5.3)
POTASSIUM SERPL-SCNC: 3.9 MMOL/L — SIGNIFICANT CHANGE UP (ref 3.5–5.3)
PROT SERPL-MCNC: 6.8 G/DL — SIGNIFICANT CHANGE UP (ref 6–8.3)
RBC # BLD: 4.8 M/UL — SIGNIFICANT CHANGE UP (ref 3.8–5.2)
RBC # FLD: 14.4 % — SIGNIFICANT CHANGE UP (ref 10.3–14.5)
SAO2 % BLDV: 86 % — SIGNIFICANT CHANGE UP (ref 67–88)
SODIUM SERPL-SCNC: 140 MMOL/L — SIGNIFICANT CHANGE UP (ref 135–145)
WBC # BLD: 10.7 K/UL — HIGH (ref 3.8–10.5)
WBC # FLD AUTO: 10.7 K/UL — HIGH (ref 3.8–10.5)

## 2019-05-22 PROCEDURE — 99284 EMERGENCY DEPT VISIT MOD MDM: CPT | Mod: GC

## 2019-05-22 NOTE — ED PROVIDER NOTE - OBJECTIVE STATEMENT
70 yo female with pmhx of dementia presenting from [facility] for lethargy today. Patient was noted today by staff to be in bed more today, was concerned she was not acting like herself with possible change in mental status. EMS was called, supplemental O2 was given which brought her O2 from 91 to 100%. Staff noted patient to be back to baseline mental status. Patient currently does not have any complaints.    Denies CP SOB dysuria fevers SOB N/V

## 2019-05-22 NOTE — ED PROVIDER NOTE - CLINICAL SUMMARY MEDICAL DECISION MAKING FREE TEXT BOX
70 yo female with pmhx of dementia presenting with lethargy today. Lethargy seemed to resolve, but will evaluate with urine studies, CBC CMP and will reassess.

## 2019-05-22 NOTE — ED PROVIDER NOTE - PROGRESS NOTE DETAILS
Kentrell Baxter PGY1  UA+ , given keflex and will send home on keflex. clinically and HD stable for discharge

## 2019-05-22 NOTE — ED PROVIDER NOTE - CARE PLAN
Principal Discharge DX:	UTI (urinary tract infection) Principal Discharge DX:	Cystitis  Secondary Diagnosis:	Alzheimer's dementia

## 2019-05-22 NOTE — ED PROVIDER NOTE - ATTENDING CONTRIBUTION TO CARE
Pt with transient behavioral disturbance this evening at NH with reported increasing drowsiness.  Pt at baseline here, awake, alert, answers questions, no reported complaints.  Clear lungs, RRR, ab soft, nt.

## 2019-05-22 NOTE — ED ADULT NURSE NOTE - OBJECTIVE STATEMENT
70 y/o female a+ox3, pmhx schizophrenia, Alzheimer's, hypothyroid, UTI, coming from assisted living via EMS for lethargy x 1 day. Sending facility called EMS as pt was "not acting herself"; pt was "more lethargic" and "wanted to sleep all day." Upon EMS arrival, pt was "back to her normal self"; spo2 on room air 91%, administered oxygen via nc at 2L. Upon ED arrival, pt denies productive cough, chest pain or discomfort, difficulty breathing, abdominal pain, n/v/d, fever or chills. Labs obtained via butterfly; urine obtained via clean catch. Pt left in position of comfort, on oxygen via 3 nc. Guard rails up, bed low and locked. Will reassess

## 2019-05-22 NOTE — ED PROVIDER NOTE - NSFOLLOWUPINSTRUCTIONS_ED_ALL_ED_FT
Activities as tolerated. Please encourage good oral and fluid intake. For pain, please take Motrin 400mg every 4 hours as needed, or Tylenol 650mg every 6 hours as needed.    For UTI, please take Keflex 500mg twice a day for 10 days.  Please see your primary care doctor within 24-48 hours for further management of your symptoms.    Please seek emergent medical management if you have any worsening signs or symptoms, such as chest pain, difficulty breathing, loss of consciousness, or persistent vomiting.

## 2019-05-22 NOTE — ED ADULT NURSE NOTE - NSIMPLEMENTINTERV_GEN_ALL_ED
Implemented All Fall Risk Interventions:  [place] to call system. Call bell, personal items and telephone within reach. Instruct patient to call for assistance. Room bathroom lighting operational. Non-slip footwear when patient is off stretcher. Physically safe environment: no spills, clutter or unnecessary equipment. Stretcher in lowest position, wheels locked, appropriate side rails in place. Provide visual cue, wrist band, yellow gown, etc. Monitor gait and stability. Monitor for mental status changes and reorient to person, place, and time. Review medications for side effects contributing to fall risk. Reinforce activity limits and safety measures with patient and family.

## 2019-05-23 VITALS
DIASTOLIC BLOOD PRESSURE: 82 MMHG | OXYGEN SATURATION: 95 % | TEMPERATURE: 98 F | SYSTOLIC BLOOD PRESSURE: 124 MMHG | RESPIRATION RATE: 20 BRPM | HEART RATE: 71 BPM

## 2019-05-23 LAB
APPEARANCE UR: ABNORMAL
BILIRUB UR-MCNC: NEGATIVE — SIGNIFICANT CHANGE UP
COLOR SPEC: SIGNIFICANT CHANGE UP
DIFF PNL FLD: NEGATIVE — SIGNIFICANT CHANGE UP
GLUCOSE UR QL: NEGATIVE — SIGNIFICANT CHANGE UP
KETONES UR-MCNC: NEGATIVE — SIGNIFICANT CHANGE UP
LEUKOCYTE ESTERASE UR-ACNC: ABNORMAL
NITRITE UR-MCNC: NEGATIVE — SIGNIFICANT CHANGE UP
PH UR: 6 — SIGNIFICANT CHANGE UP (ref 5–8)
PROT UR-MCNC: SIGNIFICANT CHANGE UP
SP GR SPEC: 1.02 — SIGNIFICANT CHANGE UP (ref 1.01–1.02)
UROBILINOGEN FLD QL: NEGATIVE — SIGNIFICANT CHANGE UP

## 2019-05-23 PROCEDURE — 82330 ASSAY OF CALCIUM: CPT

## 2019-05-23 PROCEDURE — 84295 ASSAY OF SERUM SODIUM: CPT

## 2019-05-23 PROCEDURE — 82947 ASSAY GLUCOSE BLOOD QUANT: CPT

## 2019-05-23 PROCEDURE — 82435 ASSAY OF BLOOD CHLORIDE: CPT

## 2019-05-23 PROCEDURE — 81001 URINALYSIS AUTO W/SCOPE: CPT

## 2019-05-23 PROCEDURE — 87086 URINE CULTURE/COLONY COUNT: CPT

## 2019-05-23 PROCEDURE — 84132 ASSAY OF SERUM POTASSIUM: CPT

## 2019-05-23 PROCEDURE — 82803 BLOOD GASES ANY COMBINATION: CPT

## 2019-05-23 PROCEDURE — 83605 ASSAY OF LACTIC ACID: CPT

## 2019-05-23 PROCEDURE — 85027 COMPLETE CBC AUTOMATED: CPT

## 2019-05-23 PROCEDURE — 85014 HEMATOCRIT: CPT

## 2019-05-23 PROCEDURE — 99283 EMERGENCY DEPT VISIT LOW MDM: CPT

## 2019-05-23 PROCEDURE — 87186 SC STD MICRODIL/AGAR DIL: CPT

## 2019-05-23 PROCEDURE — 80053 COMPREHEN METABOLIC PANEL: CPT

## 2019-05-23 RX ORDER — CEPHALEXIN 500 MG
500 CAPSULE ORAL ONCE
Refills: 0 | Status: COMPLETED | OUTPATIENT
Start: 2019-05-23 | End: 2019-05-23

## 2019-05-23 RX ORDER — CEPHALEXIN 500 MG
1 CAPSULE ORAL
Qty: 20 | Refills: 0
Start: 2019-05-23 | End: 2019-06-01

## 2019-05-23 RX ADMIN — Medication 500 MILLIGRAM(S): at 01:14

## 2019-05-24 LAB
-  AMIKACIN: SIGNIFICANT CHANGE UP
-  AMPICILLIN/SULBACTAM: SIGNIFICANT CHANGE UP
-  AMPICILLIN: SIGNIFICANT CHANGE UP
-  AZTREONAM: SIGNIFICANT CHANGE UP
-  CEFEPIME: SIGNIFICANT CHANGE UP
-  CEFOXITIN: SIGNIFICANT CHANGE UP
-  CEFTRIAXONE: SIGNIFICANT CHANGE UP
-  CIPROFLOXACIN: SIGNIFICANT CHANGE UP
-  ERTAPENEM: SIGNIFICANT CHANGE UP
-  GENTAMICIN: SIGNIFICANT CHANGE UP
-  IMIPENEM: SIGNIFICANT CHANGE UP
-  LEVOFLOXACIN: SIGNIFICANT CHANGE UP
-  MEROPENEM: SIGNIFICANT CHANGE UP
-  NITROFURANTOIN: SIGNIFICANT CHANGE UP
-  PIPERACILLIN/TAZOBACTAM: SIGNIFICANT CHANGE UP
-  TIGECYCLINE: SIGNIFICANT CHANGE UP
-  TOBRAMYCIN: SIGNIFICANT CHANGE UP
-  TRIMETHOPRIM/SULFAMETHOXAZOLE: SIGNIFICANT CHANGE UP
CULTURE RESULTS: SIGNIFICANT CHANGE UP
METHOD TYPE: SIGNIFICANT CHANGE UP
ORGANISM # SPEC MICROSCOPIC CNT: SIGNIFICANT CHANGE UP
ORGANISM # SPEC MICROSCOPIC CNT: SIGNIFICANT CHANGE UP
SPECIMEN SOURCE: SIGNIFICANT CHANGE UP

## 2019-05-25 RX ORDER — CEFPODOXIME PROXETIL 100 MG
1 TABLET ORAL
Qty: 14 | Refills: 0
Start: 2019-05-25 | End: 2019-05-31

## 2019-05-25 NOTE — ED POST DISCHARGE NOTE - DETAILS
5/25/19: called [facility] and spoke with Maisha on patient's care team. Informed her of ucx result and that Keflex not directly listed so cannot confirm effective tx. She advised pt still does not seem improved, given this advised I would sent cefpodoxime rx as 3rd gen cep show sensitive. Maisha aware, rx sent to preferred pharmacy and will be picked up today. Appreciative of call. - ISAK EstradaC

## 2019-07-15 ENCOUNTER — EMERGENCY (EMERGENCY)
Facility: HOSPITAL | Age: 69
LOS: 1 days | Discharge: HOME | End: 2019-07-15
Attending: EMERGENCY MEDICINE
Payer: MEDICARE

## 2019-07-15 VITALS
SYSTOLIC BLOOD PRESSURE: 107 MMHG | TEMPERATURE: 98 F | DIASTOLIC BLOOD PRESSURE: 73 MMHG | OXYGEN SATURATION: 89 % | RESPIRATION RATE: 19 BRPM | HEART RATE: 69 BPM

## 2019-07-15 LAB
ALBUMIN SERPL ELPH-MCNC: 3.8 G/DL — SIGNIFICANT CHANGE UP (ref 3.3–5)
ALP SERPL-CCNC: 108 U/L — SIGNIFICANT CHANGE UP (ref 40–120)
ALT FLD-CCNC: 18 U/L — SIGNIFICANT CHANGE UP (ref 10–45)
ANION GAP SERPL CALC-SCNC: 12 MMOL/L — SIGNIFICANT CHANGE UP (ref 5–17)
APTT BLD: 30.5 SEC — SIGNIFICANT CHANGE UP (ref 27.5–36.3)
AST SERPL-CCNC: 19 U/L — SIGNIFICANT CHANGE UP (ref 10–40)
BASE EXCESS BLDV CALC-SCNC: 0.7 MMOL/L — SIGNIFICANT CHANGE UP (ref -2–2)
BASOPHILS # BLD AUTO: 0 K/UL — SIGNIFICANT CHANGE UP (ref 0–0.2)
BASOPHILS NFR BLD AUTO: 0.4 % — SIGNIFICANT CHANGE UP (ref 0–2)
BILIRUB SERPL-MCNC: 0.2 MG/DL — SIGNIFICANT CHANGE UP (ref 0.2–1.2)
BUN SERPL-MCNC: 18 MG/DL — SIGNIFICANT CHANGE UP (ref 7–23)
CA-I SERPL-SCNC: 1.12 MMOL/L — SIGNIFICANT CHANGE UP (ref 1.12–1.3)
CALCIUM SERPL-MCNC: 8.8 MG/DL — SIGNIFICANT CHANGE UP (ref 8.4–10.5)
CHLORIDE BLDV-SCNC: 106 MMOL/L — SIGNIFICANT CHANGE UP (ref 96–108)
CHLORIDE SERPL-SCNC: 104 MMOL/L — SIGNIFICANT CHANGE UP (ref 96–108)
CO2 BLDV-SCNC: 25 MMOL/L — SIGNIFICANT CHANGE UP (ref 22–30)
CO2 SERPL-SCNC: 22 MMOL/L — SIGNIFICANT CHANGE UP (ref 22–31)
CREAT SERPL-MCNC: 0.94 MG/DL — SIGNIFICANT CHANGE UP (ref 0.5–1.3)
EOSINOPHIL # BLD AUTO: 0.2 K/UL — SIGNIFICANT CHANGE UP (ref 0–0.5)
EOSINOPHIL NFR BLD AUTO: 1.4 % — SIGNIFICANT CHANGE UP (ref 0–6)
GAS PNL BLDV: 130 MMOL/L — LOW (ref 135–145)
GAS PNL BLDV: SIGNIFICANT CHANGE UP
GAS PNL BLDV: SIGNIFICANT CHANGE UP
GLUCOSE BLDV-MCNC: 111 MG/DL — HIGH (ref 70–99)
GLUCOSE SERPL-MCNC: 115 MG/DL — HIGH (ref 70–99)
HCO3 BLDV-SCNC: 24 MMOL/L — SIGNIFICANT CHANGE UP (ref 21–29)
HCT VFR BLD CALC: 42 % — SIGNIFICANT CHANGE UP (ref 34.5–45)
HCT VFR BLDA CALC: 44 % — SIGNIFICANT CHANGE UP (ref 39–50)
HGB BLD CALC-MCNC: 14.2 G/DL — SIGNIFICANT CHANGE UP (ref 11.5–15.5)
HGB BLD-MCNC: 13.9 G/DL — SIGNIFICANT CHANGE UP (ref 11.5–15.5)
INR BLD: 1 RATIO — SIGNIFICANT CHANGE UP (ref 0.88–1.16)
LACTATE BLDV-MCNC: 1.8 MMOL/L — SIGNIFICANT CHANGE UP (ref 0.7–2)
LYMPHOCYTES # BLD AUTO: 26.1 % — SIGNIFICANT CHANGE UP (ref 13–44)
LYMPHOCYTES # BLD AUTO: 3.1 K/UL — SIGNIFICANT CHANGE UP (ref 1–3.3)
MCHC RBC-ENTMCNC: 28.7 PG — SIGNIFICANT CHANGE UP (ref 27–34)
MCHC RBC-ENTMCNC: 33 GM/DL — SIGNIFICANT CHANGE UP (ref 32–36)
MCV RBC AUTO: 86.9 FL — SIGNIFICANT CHANGE UP (ref 80–100)
MONOCYTES # BLD AUTO: 1.1 K/UL — HIGH (ref 0–0.9)
MONOCYTES NFR BLD AUTO: 9.1 % — SIGNIFICANT CHANGE UP (ref 2–14)
NEUTROPHILS # BLD AUTO: 7.5 K/UL — HIGH (ref 1.8–7.4)
NEUTROPHILS NFR BLD AUTO: 63 % — SIGNIFICANT CHANGE UP (ref 43–77)
OB PNL STL: POSITIVE
PCO2 BLDV: 36 MMHG — SIGNIFICANT CHANGE UP (ref 35–50)
PH BLDV: 7.44 — SIGNIFICANT CHANGE UP (ref 7.35–7.45)
PLATELET # BLD AUTO: 268 K/UL — SIGNIFICANT CHANGE UP (ref 150–400)
PO2 BLDV: 36 MMHG — SIGNIFICANT CHANGE UP (ref 25–45)
POTASSIUM BLDV-SCNC: 8.1 MMOL/L — CRITICAL HIGH (ref 3.5–5.3)
POTASSIUM SERPL-MCNC: 4.2 MMOL/L — SIGNIFICANT CHANGE UP (ref 3.5–5.3)
POTASSIUM SERPL-SCNC: 4.2 MMOL/L — SIGNIFICANT CHANGE UP (ref 3.5–5.3)
PROT SERPL-MCNC: 6.7 G/DL — SIGNIFICANT CHANGE UP (ref 6–8.3)
PROTHROM AB SERPL-ACNC: 11.4 SEC — SIGNIFICANT CHANGE UP (ref 10–12.9)
RBC # BLD: 4.84 M/UL — SIGNIFICANT CHANGE UP (ref 3.8–5.2)
RBC # FLD: 14.8 % — HIGH (ref 10.3–14.5)
SAO2 % BLDV: 68 % — SIGNIFICANT CHANGE UP (ref 67–88)
SODIUM SERPL-SCNC: 138 MMOL/L — SIGNIFICANT CHANGE UP (ref 135–145)
TROPONIN T, HIGH SENSITIVITY RESULT: <6 NG/L — SIGNIFICANT CHANGE UP (ref 0–51)
WBC # BLD: 11.9 K/UL — HIGH (ref 3.8–10.5)
WBC # FLD AUTO: 11.9 K/UL — HIGH (ref 3.8–10.5)

## 2019-07-15 PROCEDURE — 93010 ELECTROCARDIOGRAM REPORT: CPT

## 2019-07-15 PROCEDURE — 99285 EMERGENCY DEPT VISIT HI MDM: CPT | Mod: GC

## 2019-07-15 RX ORDER — IPRATROPIUM/ALBUTEROL SULFATE 18-103MCG
3 AEROSOL WITH ADAPTER (GRAM) INHALATION ONCE
Refills: 0 | Status: COMPLETED | OUTPATIENT
Start: 2019-07-15 | End: 2019-07-15

## 2019-07-15 RX ADMIN — Medication 3 MILLILITER(S): at 23:11

## 2019-07-15 NOTE — ED PROVIDER NOTE - PROGRESS NOTE DETAILS
DAYANA MS4: Pt was given 3x duo-nebs and endorsed breathing better. O2 sat improved to 94. Pt now breathing 92-94% on RA, in no acute distress. This is near her baseline, no susp for PE, will cancel CTA, assess CXR, likely dc back to [place] after -[place], pgy3 CTA acquired for safety's sake, no PE visualized, old pulm notes reviewed showing pt satting similarly on RA. Pt continues to appears comfortable. No additional rectal bleeding. Stable for dc back to [facility], pgy3 Attending MD Carrington: Labs and rads reviewed, no PE, sat'ing 90-91%, HR 70s, RR 16 with no increased work of breathing, resting comfortably.  Stable for discharge.

## 2019-07-15 NOTE — ED PROVIDER NOTE - CLINICAL SUMMARY MEDICAL DECISION MAKING FREE TEXT BOX
70yo h/o HTN, dementia, sepsis c/b hypoxia, sent in from NH for rectal bleeding. based on old chart review pt with O2 normally around 93%, currently pt close to baseline at 91%. exam benign, pt well appearing. will check basic labs, ekg. if unreamarkble d/c home. Dr. Poole (Attending Physician)  70yo h/o HTN, dementia, sepsis c/b hypoxia with baseline o2 sat 90-93%, sent in from NH for rectal bleeding currently pt close to baseline at 91%. exam benign, pt well appearing. no rectal bleeding on our exam. will check basic labs, ekg. if unreamarkble d/c home.

## 2019-07-15 NOTE — ED PROVIDER NOTE - PHYSICAL EXAMINATION
Vitals: hypoxic, remaidner wnl  Gen: laying comfortably in NAD  Head: NCAT  ENT: sclerae white, anicterus, moist mucous membranes. No exudates.   CV: RRR. Audible S1 and S2. No murmurs, rubs, gallops, S3, nor S4, 2+ radial and DP pulses   Pulm: Clear to auscultation bilaterally. No wheezes, rales, or rhonchi  Abd: soft, normoactive BS x4, NTND, no rebound, no guarding, no rashes  Musculoskeletal:  No peripheral edema  Skin: no lesions or scars noted  Neurologic: AAOx3  : no CVA tenderness, rectal exam (chaperoned by Dr John Poole) - minimal stool in vault, no blood  Psych: no SI/HI

## 2019-07-15 NOTE — ED PROVIDER NOTE - NS ED ROS FT
Constitutional: no fevers, chills  HEENT: no visual changes, no sore throat, no rhinorrhea  CV: no cp  Resp: no sob  GI: no abd pain, n/v, diarrhea/constipation  : +rectal bleeding, no dysuria, hematuria  MSK: no joint pains  skin: no rashes  neuro: no HA, no confusion  psych: no SI/HI  heme: no LAD

## 2019-07-15 NOTE — ED PROVIDER NOTE - NSFOLLOWUPCLINICS_GEN_ALL_ED_FT
[facility]  Gastroenterology  [address] [place] [id number]  Phone: [phone]  Fax:   Follow Up Time:

## 2019-07-15 NOTE — ED PROVIDER NOTE - NSFOLLOWUPINSTRUCTIONS_ED_ALL_ED_FT
PLEASE FOLLOW UP WITH GASTROENTEROLOGY FOR CONTINUED EVALUATION AND PLEASE FOLLOW UP WITH GASTROENTEROLOGY FOR CONTINUED EVALUATION AND MANAGEMENT IF YOU KEEP HAVING RECTAL BLEEDING

## 2019-07-15 NOTE — ED PROVIDER NOTE - ATTENDING CONTRIBUTION TO CARE
Dr. Poole (Attending Physician)  I performed a history and physical exam of the patient and discussed their management with the resident. I reviewed the resident's note and agree with the documented findings and plan of care. My medical decision making and observations are found above.

## 2019-07-15 NOTE — ED ADULT NURSE NOTE - OBJECTIVE STATEMENT
69y female with hx of schizophrenia, dementia, respiratory failure secondary to sepsis BIBEMS from [place] with rectal bleeding. Pt is alert and oriented x 4 and speaking coherently. EMS states pt is here for rectal bleeding. no blood noted in diaper, external hemorrhoids noted. pt states she did notice blood in her stool earlier tonight. pt hypoxic on RA, respirations non labored. pt placed on 3L nc. pt afebrile rectally. vss. skin intact. md weaver completed. will reassess.

## 2019-07-15 NOTE — ED ADULT NURSE NOTE - NSIMPLEMENTINTERV_GEN_ALL_ED
Implemented All Fall Risk Interventions:  [place] to call system. Call bell, personal items and telephone within reach. Instruct patient to call for assistance. Room bathroom lighting operational. Non-slip footwear when patient is off stretcher. Physically safe environment: no spills, clutter or unnecessary equipment. Stretcher in lowest position, wheels locked, appropriate side rails in place. Provide visual cue, wrist band, yellow gown, etc. Monitor gait and stability. Monitor for mental status changes and reorient to person, place, and time. Review medications for side effects contributing to fall risk. Reinforce activity limits and safety measures with patient and family.

## 2019-07-15 NOTE — ED PROVIDER NOTE - OBJECTIVE STATEMENT
70yo h/o HTN, dementia, sepsis c/b hypoxia, sent in from NH for rectal bleeding. pt currently AAOx4, reports she had minimal blood in her stool 2d ago, only when she wipes. also found to be hypoxic 88% on RA. denies f/c, cough, sob, cp, abd pain, dizziness/lightheadness, urinary symptoms. never had colonscopy.

## 2019-07-16 VITALS
OXYGEN SATURATION: 89 % | RESPIRATION RATE: 18 BRPM | HEART RATE: 71 BPM | TEMPERATURE: 99 F | SYSTOLIC BLOOD PRESSURE: 109 MMHG | DIASTOLIC BLOOD PRESSURE: 71 MMHG

## 2019-07-16 PROCEDURE — 85730 THROMBOPLASTIN TIME PARTIAL: CPT

## 2019-07-16 PROCEDURE — 82947 ASSAY GLUCOSE BLOOD QUANT: CPT

## 2019-07-16 PROCEDURE — 94640 AIRWAY INHALATION TREATMENT: CPT

## 2019-07-16 PROCEDURE — 84295 ASSAY OF SERUM SODIUM: CPT

## 2019-07-16 PROCEDURE — 99285 EMERGENCY DEPT VISIT HI MDM: CPT | Mod: 25

## 2019-07-16 PROCEDURE — 71275 CT ANGIOGRAPHY CHEST: CPT

## 2019-07-16 PROCEDURE — 85027 COMPLETE CBC AUTOMATED: CPT

## 2019-07-16 PROCEDURE — 85610 PROTHROMBIN TIME: CPT

## 2019-07-16 PROCEDURE — 85014 HEMATOCRIT: CPT

## 2019-07-16 PROCEDURE — 82330 ASSAY OF CALCIUM: CPT

## 2019-07-16 PROCEDURE — 82803 BLOOD GASES ANY COMBINATION: CPT

## 2019-07-16 PROCEDURE — 83880 ASSAY OF NATRIURETIC PEPTIDE: CPT

## 2019-07-16 PROCEDURE — 83605 ASSAY OF LACTIC ACID: CPT

## 2019-07-16 PROCEDURE — 71045 X-RAY EXAM CHEST 1 VIEW: CPT

## 2019-07-16 PROCEDURE — 84132 ASSAY OF SERUM POTASSIUM: CPT

## 2019-07-16 PROCEDURE — 93005 ELECTROCARDIOGRAM TRACING: CPT

## 2019-07-16 PROCEDURE — 71045 X-RAY EXAM CHEST 1 VIEW: CPT | Mod: 26

## 2019-07-16 PROCEDURE — 84484 ASSAY OF TROPONIN QUANT: CPT

## 2019-07-16 PROCEDURE — 80053 COMPREHEN METABOLIC PANEL: CPT

## 2019-07-16 PROCEDURE — 82272 OCCULT BLD FECES 1-3 TESTS: CPT

## 2019-07-16 PROCEDURE — 82435 ASSAY OF BLOOD CHLORIDE: CPT

## 2019-07-16 PROCEDURE — 71275 CT ANGIOGRAPHY CHEST: CPT | Mod: 26

## 2020-02-19 NOTE — ED ADULT NURSE NOTE - NSIMPLEMENTINTERV_GEN_ALL_ED
Hide Additional Notes?: No Detail Level: Detailed Detail Level: Zone Implemented All Universal Safety Interventions:  [place] to call system. Call bell, personal items and telephone within reach. Instruct patient to call for assistance. Room bathroom lighting operational. Non-slip footwear when patient is off stretcher. Physically safe environment: no spills, clutter or unnecessary equipment. Stretcher in lowest position, wheels locked, appropriate side rails in place.

## 2021-01-27 ENCOUNTER — EMERGENCY (EMERGENCY)
Facility: HOSPITAL | Age: 71
LOS: 1 days | Discharge: HOME | End: 2021-01-27
Attending: EMERGENCY MEDICINE | Admitting: INTERNAL MEDICINE
Payer: MEDICARE

## 2021-01-27 VITALS
TEMPERATURE: 99 F | HEIGHT: 67 IN | DIASTOLIC BLOOD PRESSURE: 82 MMHG | OXYGEN SATURATION: 94 % | WEIGHT: 169.98 LBS | RESPIRATION RATE: 17 BRPM | SYSTOLIC BLOOD PRESSURE: 112 MMHG | HEART RATE: 57 BPM

## 2021-01-27 DIAGNOSIS — E78.5 HYPERLIPIDEMIA, UNSPECIFIED: ICD-10-CM

## 2021-01-27 DIAGNOSIS — U07.1 COVID-19: ICD-10-CM

## 2021-01-27 DIAGNOSIS — G30.9 ALZHEIMER'S DISEASE, UNSPECIFIED: ICD-10-CM

## 2021-01-27 DIAGNOSIS — E03.9 HYPOTHYROIDISM, UNSPECIFIED: ICD-10-CM

## 2021-01-27 DIAGNOSIS — Z29.9 ENCOUNTER FOR PROPHYLACTIC MEASURES, UNSPECIFIED: ICD-10-CM

## 2021-01-27 DIAGNOSIS — R60.0 LOCALIZED EDEMA: ICD-10-CM

## 2021-01-27 DIAGNOSIS — F20.0 PARANOID SCHIZOPHRENIA: ICD-10-CM

## 2021-01-27 LAB
APTT BLD: 33.1 SEC — SIGNIFICANT CHANGE UP (ref 27.5–35.5)
BASE EXCESS BLDV CALC-SCNC: 0.7 MMOL/L — SIGNIFICANT CHANGE UP (ref -2–2)
BASOPHILS # BLD AUTO: 0.04 K/UL — SIGNIFICANT CHANGE UP (ref 0–0.2)
BASOPHILS NFR BLD AUTO: 0.5 % — SIGNIFICANT CHANGE UP (ref 0–2)
CA-I SERPL-SCNC: 1.23 MMOL/L — SIGNIFICANT CHANGE UP (ref 1.12–1.3)
CHLORIDE BLDV-SCNC: 108 MMOL/L — SIGNIFICANT CHANGE UP (ref 96–108)
CO2 BLDV-SCNC: 27 MMOL/L — SIGNIFICANT CHANGE UP (ref 22–30)
CRP SERPL-MCNC: <0.3 MG/DL — SIGNIFICANT CHANGE UP (ref 0–0.4)
D DIMER BLD IA.RAPID-MCNC: 155 NG/ML DDU — SIGNIFICANT CHANGE UP
EOSINOPHIL # BLD AUTO: 0.06 K/UL — SIGNIFICANT CHANGE UP (ref 0–0.5)
EOSINOPHIL NFR BLD AUTO: 0.8 % — SIGNIFICANT CHANGE UP (ref 0–6)
GAS PNL BLDV: 142 MMOL/L — SIGNIFICANT CHANGE UP (ref 135–145)
GAS PNL BLDV: SIGNIFICANT CHANGE UP
GAS PNL BLDV: SIGNIFICANT CHANGE UP
GLUCOSE BLDV-MCNC: 99 MG/DL — SIGNIFICANT CHANGE UP (ref 70–99)
HCO3 BLDV-SCNC: 26 MMOL/L — SIGNIFICANT CHANGE UP (ref 21–29)
HCT VFR BLD CALC: 38.6 % — SIGNIFICANT CHANGE UP (ref 34.5–45)
HCT VFR BLDA CALC: 39 % — SIGNIFICANT CHANGE UP (ref 39–50)
HGB BLD CALC-MCNC: 12.7 G/DL — SIGNIFICANT CHANGE UP (ref 11.5–15.5)
HGB BLD-MCNC: 12.6 G/DL — SIGNIFICANT CHANGE UP (ref 11.5–15.5)
IMM GRANULOCYTES NFR BLD AUTO: 0.3 % — SIGNIFICANT CHANGE UP (ref 0–1.5)
INR BLD: 1 RATIO — SIGNIFICANT CHANGE UP (ref 0.88–1.16)
LACTATE BLDV-MCNC: 1.4 MMOL/L — SIGNIFICANT CHANGE UP (ref 0.7–2)
LYMPHOCYTES # BLD AUTO: 2.48 K/UL — SIGNIFICANT CHANGE UP (ref 1–3.3)
LYMPHOCYTES # BLD AUTO: 33.3 % — SIGNIFICANT CHANGE UP (ref 13–44)
MCHC RBC-ENTMCNC: 29.6 PG — SIGNIFICANT CHANGE UP (ref 27–34)
MCHC RBC-ENTMCNC: 32.6 GM/DL — SIGNIFICANT CHANGE UP (ref 32–36)
MCV RBC AUTO: 90.8 FL — SIGNIFICANT CHANGE UP (ref 80–100)
MONOCYTES # BLD AUTO: 0.57 K/UL — SIGNIFICANT CHANGE UP (ref 0–0.9)
MONOCYTES NFR BLD AUTO: 7.7 % — SIGNIFICANT CHANGE UP (ref 2–14)
NEUTROPHILS # BLD AUTO: 4.27 K/UL — SIGNIFICANT CHANGE UP (ref 1.8–7.4)
NEUTROPHILS NFR BLD AUTO: 57.4 % — SIGNIFICANT CHANGE UP (ref 43–77)
NRBC # BLD: 0 /100 WBCS — SIGNIFICANT CHANGE UP (ref 0–0)
PCO2 BLDV: 44 MMHG — SIGNIFICANT CHANGE UP (ref 35–50)
PH BLDV: 7.38 — SIGNIFICANT CHANGE UP (ref 7.35–7.45)
PLATELET # BLD AUTO: 264 K/UL — SIGNIFICANT CHANGE UP (ref 150–400)
PO2 BLDV: 52 MMHG — HIGH (ref 25–45)
POTASSIUM BLDV-SCNC: 3.8 MMOL/L — SIGNIFICANT CHANGE UP (ref 3.5–5.3)
PROCALCITONIN SERPL-MCNC: 0.06 NG/ML — SIGNIFICANT CHANGE UP (ref 0.02–0.1)
PROTHROM AB SERPL-ACNC: 12 SEC — SIGNIFICANT CHANGE UP (ref 10.6–13.6)
RBC # BLD: 4.25 M/UL — SIGNIFICANT CHANGE UP (ref 3.8–5.2)
RBC # FLD: 13.8 % — SIGNIFICANT CHANGE UP (ref 10.3–14.5)
SAO2 % BLDV: 85 % — SIGNIFICANT CHANGE UP (ref 67–88)
WBC # BLD: 7.44 K/UL — SIGNIFICANT CHANGE UP (ref 3.8–10.5)
WBC # FLD AUTO: 7.44 K/UL — SIGNIFICANT CHANGE UP (ref 3.8–10.5)

## 2021-01-27 RX ORDER — SIMVASTATIN 20 MG/1
20 TABLET, FILM COATED ORAL AT BEDTIME
Refills: 0 | Status: DISCONTINUED | OUTPATIENT
Start: 2021-01-27 | End: 2021-01-28

## 2021-01-27 RX ORDER — DEXAMETHASONE 0.5 MG/5ML
6 ELIXIR ORAL ONCE
Refills: 0 | Status: COMPLETED | OUTPATIENT
Start: 2021-01-27 | End: 2021-01-27

## 2021-01-27 RX ORDER — LEVOTHYROXINE SODIUM 125 MCG
88 TABLET ORAL DAILY
Refills: 0 | Status: DISCONTINUED | OUTPATIENT
Start: 2021-01-28 | End: 2021-01-28

## 2021-01-27 RX ORDER — FUROSEMIDE 40 MG
20 TABLET ORAL DAILY
Refills: 0 | Status: DISCONTINUED | OUTPATIENT
Start: 2021-01-28 | End: 2021-01-28

## 2021-01-27 RX ORDER — RISPERIDONE 4 MG/1
0.5 TABLET ORAL DAILY
Refills: 0 | Status: DISCONTINUED | OUTPATIENT
Start: 2021-01-28 | End: 2021-01-28

## 2021-01-27 RX ADMIN — Medication 6 MILLIGRAM(S): at 19:58

## 2021-01-27 NOTE — ED ADULT TRIAGE NOTE - CHIEF COMPLAINT QUOTE
sent from [facility], "she has a positive covid test, she cannot come back until she has 2 negatives"   pt denies complaints / pain at this time

## 2021-01-27 NOTE — H&P ADULT - NSICDXPASTMEDICALHX_GEN_ALL_CORE_FT
PAST MEDICAL HISTORY:  Alzheimer's dementia     Hypothyroid     Paranoid schizophrenia     Upper GI bleed

## 2021-01-27 NOTE — ED PROVIDER NOTE - ATTENDING CONTRIBUTION TO CARE
Attending MD Desiree Mobley:  I personally have seen and examined this patient.  Resident note reviewed and agree on plan of care and except where noted.  See HPI, PE, and MDM for details.

## 2021-01-27 NOTE — ED PROVIDER NOTE - OBJECTIVE STATEMENT
70F hx of hypothyroidism, schizophrenia coming from [place] after + covid swab today and reportedly needs 2 neg lyndsey swabs to return. Patient denies fever, chills, chest pain, shortness of breath, abd pain, nausea, vomiting, diarrhea, leg pain or swelling.

## 2021-01-27 NOTE — H&P ADULT - HISTORY OF PRESENT ILLNESS
70 yr old F with PMH of hypothyroidism, dementia, hld, pedal edema, and schizophrenia presents from [place] after a positive covid swab today. Patient AOx3; denies fever, chills, cough, shortness of breath, chest pain, nausea, abdominal discomfort, or diarrhea. Also endorses taste and smell intact. While in ED, patient noted to desaturate to 89% while ambulating, improved on NC.   Discussed with nurse, Rubi, from [facility]. No known symptoms or desaturation at assisted living facility prior to transfer.   Patient currently on 2L NC.   Vitals wnl.     Per ED, Reportedly needs 2 neg lyndsey swabs to return. Discussed with nurse at [facility]-- patient does Not require negative covid test prior for return to [facility].

## 2021-01-27 NOTE — ED ADULT NURSE NOTE - NSIMPLEMENTINTERV_GEN_ALL_ED
Implemented All Fall Risk Interventions:  [place] to call system. Call bell, personal items and telephone within reach. Instruct patient to call for assistance. Room bathroom lighting operational. Non-slip footwear when patient is off stretcher. Physically safe environment: no spills, clutter or unnecessary equipment. Stretcher in lowest position, wheels locked, appropriate side rails in place. Provide visual cue, wrist band, yellow gown, etc. Monitor gait and stability. Monitor for mental status changes and reorient to person, place, and time. Review medications for side effects contributing to fall risk. Reinforce activity limits and safety measures with patient and family.

## 2021-01-27 NOTE — H&P ADULT - NSHPLABSRESULTS_GEN_ALL_CORE
Personally reviewed labs. Personally reviewed imaging. Personally reviewed EKG.                         12.6   7.44  )-----------( 264      ( 27 Jan 2021 21:15 )             38.6     01-27    141  |  106  |  23  ----------------------------<  108<H>  3.8   |  25  |  0.94    Ca    9.2      27 Jan 2021 21:13    TPro  6.9  /  Alb  4.0  /  TBili  0.1<L>  /  DBili  x   /  AST  16  /  ALT  12  /  AlkPhos  94  01-27  LIVER FUNCTIONS - ( 27 Jan 2021 21:13 )  Alb: 4.0 g/dL / Pro: 6.9 g/dL / ALK PHOS: 94 U/L / ALT: 12 U/L / AST: 16 U/L / GGT: x           PT/INR - ( 27 Jan 2021 21:15 )   PT: 12.0 sec;   INR: 1.00 ratio    PTT - ( 27 Jan 2021 21:15 )  PTT:33.1 sec    CXR w/ patchy opacities and mild left sided pleural effusion  EKG:

## 2021-01-27 NOTE — H&P ADULT - NSHPSOCIALHISTORY_GEN_ALL_CORE
Denies hx of smoking or etoh  Lives in [facility]  No children, not   Has two brothers, one in [place] one in [facility]  Emergency contact is niece, Tomas  Ambulates independently w/o assistive device

## 2021-01-27 NOTE — H&P ADULT - NSHPREVIEWOFSYSTEMS_GEN_ALL_CORE
REVIEW OF SYSTEMS:  CONSTITUTIONAL: No weakness. No fevers. No chills. No rigors. No weight loss. No night sweats. No poor appetite.  EYES: No blurry or double vision. No eye pain.  ENT: No hearing difficulty. No vertigo. No dysphagia. No sore throat. No Sinusitis/rhinorrhea.   NECK: No pain. No stiffness/rigidity.  CARDIAC: No chest pain. No palpitations. No lightheadedness. No syncope.  RESPIRATORY: No cough. No SOB. No hemoptysis.  GASTROINTESTINAL: No abdominal pain. No nausea. No vomiting. No hematemesis. No diarrhea. No constipation. No melena. No hematochezia.  GENITOURINARY: No dysuria. No frequency. No hesitancy. No hematuria. No oliguria.  NEUROLOGICAL: No numbness/tingling. No focal weakness. No urinary or fecal incontinence. No headache. No unsteady gait.  BACK: No back pain. No flank pain.  EXTREMITIES: No lower extremity edema. Full ROM. No joint pain.  SKIN: No rashes. No itching. No other lesions.  PSYCHIATRIC: No depression. No anxiety. No SI/HI.  ALLERGIC: No lip swelling. No hives.  All other review of systems is negative unless indicated above.  Unless indicated above, unable to assess ROS 2/2

## 2021-01-27 NOTE — ED ADULT NURSE NOTE - OBJECTIVE STATEMENT
70y f arrived to the ED Via EMS from [facility]. As per EMS " pt has a history of dementia and resulted as Covid + today so was sent here for evaluation." Upon arrival to ED Pt A&Ox3 at present, denies pain at present. Pt O2 sat 92% On RA, Pt placed on 2L NC O2 sat increased to 100%. Pt able to ambulate with assistance at present. Pt denies chest pain, SOB, HA, N/V/D, abdominal pain, fever/chills, urinary symptoms, hematuria, weakness, dizziness, numbness, tinging. Peripheral pulses present b/l. Skin warm, dry and pink. Pt placed in position of comfort. Pt educated on call bell system and provided call bell. Bed in lowest position, wheels locked, appropriate side rails raised. Pt denies needs at this time.

## 2021-01-27 NOTE — ED PROVIDER NOTE - CLINICAL SUMMARY MEDICAL DECISION MAKING FREE TEXT BOX
70F hx of hypothryoid, schizophrenia, dementia here from [place] due to + covid test and needs neg x2 to return. Sat 90% on amb sat here, improved to 98% on2L NC. Otherwise vitals wnl. Exam benign. Eval for progression of covid vs superimposed infection vs metabolic derangement vs procoagulopathic state. Check covid labs, inflam markers, cxr, ekg, decadron. 70F hx of hypothryoid, schizophrenia, dementia here from [place] due to + covid test and needs neg x2 to return. Sat 90% on amb sat here, improved to 98% on2L NC. Otherwise vitals wnl. Exam benign. Eval for progression of covid vs superimposed infection vs metabolic derangement vs procoagulopathic state. Check covid labs, inflam markers, cxr, ekg, decadron.  Attending Desiree Mobley: 70 y/ofemale h/o dementia sent in from SNF for positive covid test. upon arrival pt well appearing. while in the ed pt became more hypoxic requiring supplemental oxygen. will start decadron, send covid labs. pt will likely need admission for oxygen and monitoring.

## 2021-01-27 NOTE — H&P ADULT - NSHPPHYSICALEXAM_GEN_ALL_CORE
PHYSICAL EXAM:   GENERAL: Alert. Not confused. No acute distress. Not thin. Not cachectic. Not obese.  HEAD:  Atraumatic. Normocephalic.  EYES: EOMI. PERRLA. Normal conjunctiva/sclera.  ENT: Neck supple. No JVD. Moist oral mucosa. Not edentulous. No thrush.  LYMPH: Normal supraclavicular/cervical lymph nodes.   CARDIAC: No tachy, Not jared. Regular rhythm. Not irregularly irregular. S1. S2. No murmur. No rub. No distant heart sounds.  LUNG/CHEST: CTAB. BS equal bilaterally. No wheezes. No rales. No rhonchi.  ABDOMEN: Soft. No tenderness. No distension. No fluid wave. Normal bowel sounds.  BACK: No midline/vertebral tenderness. No flank tenderness.  VASCULAR: +2 b/l radial  pulses. Palpable DP pulses.  EXTREMITIES:  No clubbing. No cyanosis. Moving all 4. Mild b/l pedal edema.  NEUROLOGY: A&Ox3. Non-focal exam. Cranial nerves intact. Normal speech. Sensation intact.  PSYCH: Normal behavior. Normal affect.  SKIN: No jaundice. No erythema. No rash/lesion.  Vascular Access:     ICU Vital Signs Last 24 Hrs  T(C): 36.7 (27 Jan 2021 20:34), Max: 37.1 (27 Jan 2021 19:25)  T(F): 98.1 (27 Jan 2021 20:34), Max: 98.8 (27 Jan 2021 19:25)  HR: 56 (27 Jan 2021 20:34) (56 - 57)  BP: 106/61 (27 Jan 2021 20:34) (106/61 - 112/82)  BP(mean): --  ABP: --  ABP(mean): --  RR: 18 (27 Jan 2021 20:34) (17 - 18)  SpO2: 100% (27 Jan 2021 20:34) (92% - 100%)      I&O's Summary

## 2021-01-27 NOTE — ED PROVIDER NOTE - NS ED ROS FT
CONSTITUTIONAL: No fevers, chills, fatigue, dizziness, weakness, unexpected weight change  EYES: No double vision, blurry vision  ENT: No ear pain, nasal congestion, runny nose, sore throat  CV: No chest pain, palpitations  PULM: No cough, shortness of breath  GI: No abdominal pain, nausea, vomiting, diarrhea, constipation  : No dysuria, polyuria, hematuria  SKIN: No rashes, swelling  MSK: No muscle aches  NEURO: No headache, paresthesias  PSYCHIATRIC: Denies suicidal, homicidal ideations. No auditory, visual, tactile hallucinations

## 2021-01-27 NOTE — H&P ADULT - PROBLEM SELECTOR PLAN 1
tested positive at [facility]; currently denies dyspnea but noted to desaturate on room air to 89%; improved and maintaing O2 saturation on 2L NC  - c/w supplemental O2 to maintain saturation > 93%  - start dexamethasone 6 mg qd  - VTE ppx: enoxaparin subq qd

## 2021-01-27 NOTE — H&P ADULT - ASSESSMENT
70 yr old female with PMH of dementia, paranoid schizophrenia, hypothyroidism, hld and pedal edema presents with covid 19 positive test at [facility]. Found to be hypoxic on RA to 89% w/ ambulation and 90% at rest.

## 2021-01-27 NOTE — ED PROVIDER NOTE - PHYSICAL EXAMINATION
GENERAL: elderly female, lying in bed, amb sat 90% improved to 98% on 2L NC. Otherwise Vital signs are within normal limits  EYES: Conjunctiva noninjected or pale, sclera anicteric  HENT: NC/AT, moist mucous membranes  NECK: Supple, trachea midline  LUNG: Nonlabored respirations, no wheezes, rales  CV: RRR, Pulses- Radial: 2+ b/l  ABDOMEN: Nondistended, nontender  MSK: No visible deformities, nontender extremities, no LE edema  SKIN: No bruises. + left ankle old skin rash  NEURO: AAOx3 (to person, place, time) no tremor, steady gait  PSYCH: Normal mood and affect GENERAL: elderly female, lying in bed, amb sat 90% improved to 98% on 2L NC. Otherwise Vital signs are within normal limits  EYES: Conjunctiva noninjected or pale, sclera anicteric  HENT: NC/AT, moist mucous membranes  NECK: Supple, trachea midline  LUNG: Nonlabored respirations, no wheezes, rales  CV: RRR, Pulses- Radial: 2+ b/l  ABDOMEN: Nondistended, nontender  MSK: No visible deformities, nontender extremities, no LE edema  SKIN: No bruises. + left ankle old skin rash  NEURO: AAOx3 (to person, place, time) no tremor, steady gait  PSYCH: Normal mood and affect  Attending Desiree Mobley: Gen: NAD, heent: atrauamtic, eomi, , mmm, op pink, neck; nttp, no nuchal rigidity, chest: nttp, no crepitus, cv: rrr, no murmurs, lungs: ctab, abd: soft, nontender, nondistended, no peritoneal signs,, ext: wwp, neg homans, skin: no rash, neuro: awake  following commands, speech clear, sensation and strength intact, no focal deficits

## 2021-01-28 ENCOUNTER — TRANSCRIPTION ENCOUNTER (OUTPATIENT)
Age: 71
End: 2021-01-28

## 2021-01-28 VITALS
RESPIRATION RATE: 18 BRPM | HEART RATE: 75 BPM | OXYGEN SATURATION: 97 % | DIASTOLIC BLOOD PRESSURE: 65 MMHG | TEMPERATURE: 98 F | SYSTOLIC BLOOD PRESSURE: 99 MMHG

## 2021-01-28 LAB
ALBUMIN SERPL ELPH-MCNC: 4.1 G/DL — SIGNIFICANT CHANGE UP (ref 3.3–5)
ALP SERPL-CCNC: 99 U/L — SIGNIFICANT CHANGE UP (ref 40–120)
ALT FLD-CCNC: 12 U/L — SIGNIFICANT CHANGE UP (ref 10–45)
ANION GAP SERPL CALC-SCNC: 13 MMOL/L — SIGNIFICANT CHANGE UP (ref 5–17)
AST SERPL-CCNC: 19 U/L — SIGNIFICANT CHANGE UP (ref 10–40)
BILIRUB SERPL-MCNC: 0.2 MG/DL — SIGNIFICANT CHANGE UP (ref 0.2–1.2)
BUN SERPL-MCNC: 24 MG/DL — HIGH (ref 7–23)
CALCIUM SERPL-MCNC: 9.6 MG/DL — SIGNIFICANT CHANGE UP (ref 8.4–10.5)
CHLORIDE SERPL-SCNC: 107 MMOL/L — SIGNIFICANT CHANGE UP (ref 96–108)
CO2 SERPL-SCNC: 21 MMOL/L — LOW (ref 22–31)
CREAT SERPL-MCNC: 0.63 MG/DL — SIGNIFICANT CHANGE UP (ref 0.5–1.3)
FERRITIN SERPL-MCNC: 133 NG/ML — SIGNIFICANT CHANGE UP (ref 15–150)
GLUCOSE SERPL-MCNC: 120 MG/DL — HIGH (ref 70–99)
HCT VFR BLD CALC: 40.7 % — SIGNIFICANT CHANGE UP (ref 34.5–45)
HCV AB S/CO SERPL IA: 0.08 S/CO — SIGNIFICANT CHANGE UP (ref 0–0.99)
HCV AB SERPL-IMP: SIGNIFICANT CHANGE UP
HGB BLD-MCNC: 13.5 G/DL — SIGNIFICANT CHANGE UP (ref 11.5–15.5)
MCHC RBC-ENTMCNC: 30.1 PG — SIGNIFICANT CHANGE UP (ref 27–34)
MCHC RBC-ENTMCNC: 33.2 GM/DL — SIGNIFICANT CHANGE UP (ref 32–36)
MCV RBC AUTO: 90.8 FL — SIGNIFICANT CHANGE UP (ref 80–100)
NRBC # BLD: 0 /100 WBCS — SIGNIFICANT CHANGE UP (ref 0–0)
PLATELET # BLD AUTO: 282 K/UL — SIGNIFICANT CHANGE UP (ref 150–400)
POTASSIUM SERPL-MCNC: 4.7 MMOL/L — SIGNIFICANT CHANGE UP (ref 3.5–5.3)
POTASSIUM SERPL-SCNC: 4.7 MMOL/L — SIGNIFICANT CHANGE UP (ref 3.5–5.3)
PROT SERPL-MCNC: 6.9 G/DL — SIGNIFICANT CHANGE UP (ref 6–8.3)
RBC # BLD: 4.48 M/UL — SIGNIFICANT CHANGE UP (ref 3.8–5.2)
RBC # FLD: 13.6 % — SIGNIFICANT CHANGE UP (ref 10.3–14.5)
SARS-COV-2 IGG SERPL QL IA: NEGATIVE — SIGNIFICANT CHANGE UP
SARS-COV-2 IGM SERPL IA-ACNC: <0.1 INDEX — SIGNIFICANT CHANGE UP
SARS-COV-2 RNA SPEC QL NAA+PROBE: SIGNIFICANT CHANGE UP
SODIUM SERPL-SCNC: 141 MMOL/L — SIGNIFICANT CHANGE UP (ref 135–145)
WBC # BLD: 7.74 K/UL — SIGNIFICANT CHANGE UP (ref 3.8–10.5)
WBC # FLD AUTO: 7.74 K/UL — SIGNIFICANT CHANGE UP (ref 3.8–10.5)

## 2021-01-28 PROCEDURE — 85025 COMPLETE CBC W/AUTO DIFF WBC: CPT

## 2021-01-28 PROCEDURE — 71250 CT THORAX DX C-: CPT

## 2021-01-28 PROCEDURE — 85730 THROMBOPLASTIN TIME PARTIAL: CPT

## 2021-01-28 PROCEDURE — 84295 ASSAY OF SERUM SODIUM: CPT

## 2021-01-28 PROCEDURE — 93005 ELECTROCARDIOGRAM TRACING: CPT

## 2021-01-28 PROCEDURE — U0005: CPT

## 2021-01-28 PROCEDURE — 82728 ASSAY OF FERRITIN: CPT

## 2021-01-28 PROCEDURE — 71045 X-RAY EXAM CHEST 1 VIEW: CPT

## 2021-01-28 PROCEDURE — 99285 EMERGENCY DEPT VISIT HI MDM: CPT | Mod: 25

## 2021-01-28 PROCEDURE — 86769 SARS-COV-2 COVID-19 ANTIBODY: CPT

## 2021-01-28 PROCEDURE — 80053 COMPREHEN METABOLIC PANEL: CPT

## 2021-01-28 PROCEDURE — 85018 HEMOGLOBIN: CPT

## 2021-01-28 PROCEDURE — 82330 ASSAY OF CALCIUM: CPT

## 2021-01-28 PROCEDURE — 82803 BLOOD GASES ANY COMBINATION: CPT

## 2021-01-28 PROCEDURE — 96374 THER/PROPH/DIAG INJ IV PUSH: CPT

## 2021-01-28 PROCEDURE — 84145 PROCALCITONIN (PCT): CPT

## 2021-01-28 PROCEDURE — 85379 FIBRIN DEGRADATION QUANT: CPT

## 2021-01-28 PROCEDURE — 87635 SARS-COV-2 COVID-19 AMP PRB: CPT

## 2021-01-28 PROCEDURE — 82947 ASSAY GLUCOSE BLOOD QUANT: CPT

## 2021-01-28 PROCEDURE — 84132 ASSAY OF SERUM POTASSIUM: CPT

## 2021-01-28 PROCEDURE — 83605 ASSAY OF LACTIC ACID: CPT

## 2021-01-28 PROCEDURE — 85014 HEMATOCRIT: CPT

## 2021-01-28 PROCEDURE — 85027 COMPLETE CBC AUTOMATED: CPT

## 2021-01-28 PROCEDURE — 85610 PROTHROMBIN TIME: CPT

## 2021-01-28 PROCEDURE — 82435 ASSAY OF BLOOD CHLORIDE: CPT

## 2021-01-28 PROCEDURE — 86140 C-REACTIVE PROTEIN: CPT

## 2021-01-28 PROCEDURE — U0003: CPT

## 2021-01-28 PROCEDURE — 86803 HEPATITIS C AB TEST: CPT

## 2021-01-28 RX ORDER — ENOXAPARIN SODIUM 100 MG/ML
40 INJECTION SUBCUTANEOUS DAILY
Refills: 0 | Status: DISCONTINUED | OUTPATIENT
Start: 2021-01-28 | End: 2021-01-28

## 2021-01-28 RX ORDER — ACETAMINOPHEN 500 MG
650 TABLET ORAL EVERY 4 HOURS
Refills: 0 | Status: DISCONTINUED | OUTPATIENT
Start: 2021-01-28 | End: 2021-01-28

## 2021-01-28 RX ORDER — DEXAMETHASONE 0.5 MG/5ML
6 ELIXIR ORAL DAILY
Refills: 0 | Status: DISCONTINUED | OUTPATIENT
Start: 2021-01-28 | End: 2021-01-28

## 2021-01-28 RX ADMIN — Medication 0.5 MILLIGRAM(S): at 06:32

## 2021-01-28 RX ADMIN — Medication 20 MILLIGRAM(S): at 06:32

## 2021-01-28 RX ADMIN — Medication 88 MICROGRAM(S): at 06:32

## 2021-01-28 RX ADMIN — RISPERIDONE 0.5 MILLIGRAM(S): 4 TABLET ORAL at 13:44

## 2021-01-28 RX ADMIN — ENOXAPARIN SODIUM 40 MILLIGRAM(S): 100 INJECTION SUBCUTANEOUS at 13:07

## 2021-01-28 NOTE — CONSULT NOTE ADULT - SUBJECTIVE AND OBJECTIVE BOX
Alicia Ferrera - [phone]    Patient is a 70 year old female who presents with a chief complaint of dyspnea. (27 Jan 2021 23:29)    HPI:    The patient is a 70 year old female with past medical history of hypothyroidism, dementia, hyperlipidemia, pedal edema, and schizophrenia who presents from [facility] after testing positive COVID-19 today. Patient AOx3. She denies fever, chills, cough, shortness of breath, chest pain, nausea, abdominal discomfort, or diarrhea. Also endorses taste and smell intact. While in emergency department, she was noted to desaturate to 89% while ambulating, improved on nasal cannula. She is currently on 2 liters on nasal cannula. CBC was unremarkable. CMP showed elevated BUN with hyperglycemia. CRP, procalcitonin, and ferritin were within satisfactory range. She was found to be tested negative for COVID-19 two times. CT chest without contrast showed degenerative changes of the spine otherwise unremarkable. CXR showed left lower lobe pneumonia. Infectious disease was consulted for further recommendations.       prior hospital charts reviewed [x]  primary team notes reviewed [x]  other consultant notes reviewed [x]    PAST MEDICAL & SURGICAL HISTORY:  Hypothyroid  Upper GI bleed  Paranoid schizophrenia  Alzheimer's dementia  No significant past surgical history        Allergies  No Known Allergies    ANTIMICROBIALS (past 90 days)  MEDICATIONS  (STANDING):          OTHER MEDS: MEDICATIONS  (STANDING):  acetaminophen   Tablet .. 650 every 4 hours PRN  acetaminophen  Suppository .. 650 every 4 hours PRN  enoxaparin Injectable 40 daily  furosemide    Tablet 20 daily  levothyroxine 88 daily  LORazepam     Tablet 0.5 two times a day  risperiDONE   Solution 0.5 daily  simvastatin 20 at bedtime    SOCIAL HISTORY:  Denies smoking, alcohol, or recreational drug use     FAMILY HISTORY:  FH: diabetes mellitus  Father      REVIEW OF SYSTEMS  [  ] ROS unobtainable because:    [x] All other systems negative except as noted below:	    Constitutional:  [ ] fever [ ] chills  [ ] weight loss  [ ] weakness  Skin:  [ ] rash [ ] phlebitis	  Eyes: [ ] icterus [ ] pain  [ ] discharge	  ENMT: [ ] sore throat  [ ] thrush [ ] ulcers [ ] exudates  Respiratory: [x] dyspnea [ ] hemoptysis [ ] cough [ ] sputum	  Cardiovascular:  [ ] chest pain [ ] palpitations [ ] edema	  Gastrointestinal:  [ ] nausea [ ] vomiting [ ] diarrhea [ ] constipation [ ] pain	  Genitourinary:  [ ] dysuria [ ] frequency [ ] hematuria [ ] discharge [ ] flank pain  [ ] incontinence  Musculoskeletal:  [ ] myalgias [ ] arthralgias [ ] arthritis  [ ] back pain  Neurological:  [ ] headache [ ] seizures  [ ] confusion/altered mental status  Psychiatric:  [ ] anxiety [ ] depression	  Hematology/Lymphatics:  [ ] lymphadenopathy  Endocrine:  [ ] adrenal [ ] thyroid  Allergic/Immunologic:	 [ ] transplant [ ] seasonal    Vital Signs Last 24 Hrs  T(F): 98.8 (01-28-21 @ 06:38), Max: 98.8 (01-27-21 @ 19:25)  Vital Signs Last 24 Hrs  HR: 72 (01-28-21 @ 06:38) (56 - 72)  BP: 119/71 (01-28-21 @ 06:38) (106/61 - 127/78)  RR: 17 (01-28-21 @ 06:38)  SpO2: 94% (01-28-21 @ 06:38) (92% - 100%)  Wt(kg): --    PHYSICAL EXAM:  Constitutional: non-toxic, no distress  HEAD/EYES: anicteric, no conjunctival injection  ENT:  supple, no thrush  Cardiovascular:   normal S1, S2, no murmur, no edema  Respiratory:  clear BS bilaterally, no wheezes, no rales  GI:  soft, non-tender, normal bowel sounds  :  no allen, no CVA tenderness  Musculoskeletal:  no synovitis, normal ROM  Neurologic: awake and alert, normal strength, no focal findings  Skin:  no rash, no erythema, no phlebitis  Heme/Onc: no lymphadenopathy   Psychiatric:  awake, alert, appropriate mood                            13.5   7.74  )-----------( 282      ( 28 Jan 2021 06:45 )             40.7   01-28    141  |  107  |  24<H>  ----------------------------<  120<H>  4.7   |  21<L>  |  0.63    Ca    9.6      28 Jan 2021 06:45    TPro  6.9  /  Alb  4.1  /  TBili  0.2  /  DBili  x   /  AST  19  /  ALT  12  /  AlkPhos  99  01-28    MICROBIOLOGY:      Repeat COVID-19 PCR in process  COVID-19 Ab in process  HCV Ab in process        RADIOLOGY:    < from: CT Chest No Cont (01.28.21 @ 07:46) >  INTERPRETATION:  Reason for Exam:  Covid positive as outpatient. Hypoxic.    CT of the chest was performed from the thoracic inlet to the level of the adrenal glands without contrast injection.    Comparison: July 16, 2019    Tubes/Lines: None.    Mediastinum/Vessels/Heart: Aorta and pulmonary arteries are normal in size. There is no pericardial effusion. No lymphadenopathy. Thyroid gland is unremarkable. Small hiatal hernia.    Lungs/Pleura/Airways: No consolidations, edema, effusion or pneumothorax. Linear and nodular scarring in the lingula is stable.    Visualized abdomen: Unremarkable noncontrast appearance of the upper abdomen    Bones and soft tissues: No suspicious osseous lesions. Degenerative changes noted throughout the spine.    IMPRESSION:    Unremarkable CT chest. No change since July 16, 2019.      < end of copied text >    < from: Xray Chest 1 View- PORTABLE-Urgent (01.27.21 @ 20:20) >  FINDINGS:  Left lower lung hazy opacity compatible with atelectasis and/or pneumonia.  No pleural effusion or pneumothorax.  Heart size cannot be appropriately assessed in this projection  No acute osseous abnormalities.    IMPRESSION:  Left lower lung zone hazy opacity compatible with atelectasis and/or pneumonia.    < end of copied text >   Alicia Ferrera - [phone]    Patient is a 70 year old female who presents with a chief complaint of dyspnea. (27 Jan 2021 23:29)    HPI:    The patient is a 70 year old female with past medical history of hypothyroidism, dementia, hyperlipidemia, pedal edema, and schizophrenia who presents from [facility] after testing positive COVID-19 today. Patient AOx3. She denies fever, chills, cough, shortness of breath, chest pain, nausea, abdominal discomfort, or diarrhea. Also endorses taste and smell intact. While in emergency department, she was noted to desaturate to 89% while ambulating, improved on nasal cannula. She is currently on 2 liters on nasal cannula. CBC was unremarkable. CMP showed elevated BUN with hyperglycemia. CRP, procalcitonin, and ferritin were within satisfactory range. She was found to be tested negative for COVID-19 two times. CT chest without contrast showed degenerative changes of the spine otherwise unremarkable. CXR showed left lower lobe pneumonia. Infectious disease was consulted for further recommendations.       prior hospital charts reviewed [x]  primary team notes reviewed [x]  other consultant notes reviewed [x]    PAST MEDICAL & SURGICAL HISTORY:  Hypothyroid  Upper GI bleed  Paranoid schizophrenia  Alzheimer's dementia  No significant past surgical history        Allergies  No Known Allergies    ANTIMICROBIALS (past 90 days)  MEDICATIONS  (STANDING):          OTHER MEDS: MEDICATIONS  (STANDING):  acetaminophen   Tablet .. 650 every 4 hours PRN  acetaminophen  Suppository .. 650 every 4 hours PRN  enoxaparin Injectable 40 daily  furosemide    Tablet 20 daily  levothyroxine 88 daily  LORazepam     Tablet 0.5 two times a day  risperiDONE   Solution 0.5 daily  simvastatin 20 at bedtime    SOCIAL HISTORY:  from [place], came to [place] when she was 6 months old  no smoking, alcohol, or recreational drug use     FAMILY HISTORY:  FH: diabetes mellitus  Father      REVIEW OF SYSTEMS  [  ] ROS unobtainable because:    [x] All other systems negative except as noted below:	    Constitutional:  [ ] fever [ ] chills  [ ] weight loss  [ ] weakness  Skin:  [ ] rash [ ] phlebitis	  Eyes: [ ] icterus [ ] pain  [ ] discharge	  ENMT: [ ] sore throat  [ ] thrush [ ] ulcers [ ] exudates  Respiratory: [] dyspnea [ ] hemoptysis [ ] cough [ ] sputum	  Cardiovascular:  [ ] chest pain [ ] palpitations [ ] edema	  Gastrointestinal:  [ ] nausea [ ] vomiting [ ] diarrhea [ ] constipation [ ] pain	  Genitourinary:  [ ] dysuria [ ] frequency [ ] hematuria [ ] discharge [ ] flank pain  [ ] incontinence  Musculoskeletal:  [ ] myalgias [ ] arthralgias [ ] arthritis  [ ] back pain  Neurological:  [ ] headache [ ] seizures  [ ] confusion/altered mental status  Psychiatric:  [ ] anxiety [ ] depression	  Hematology/Lymphatics:  [ ] lymphadenopathy  Endocrine:  [ ] adrenal [ ] thyroid  Allergic/Immunologic:	 [ ] transplant [ ] seasonal    Vital Signs Last 24 Hrs  T(F): 98.8 (01-28-21 @ 06:38), Max: 98.8 (01-27-21 @ 19:25)  Vital Signs Last 24 Hrs  HR: 72 (01-28-21 @ 06:38) (56 - 72)  BP: 119/71 (01-28-21 @ 06:38) (106/61 - 127/78)  RR: 17 (01-28-21 @ 06:38)  SpO2: 94% (01-28-21 @ 06:38) (92% - 100%)  Wt(kg): --    PHYSICAL EXAM:  Constitutional: non-toxic, no distress  HEAD/EYES: anicteric, no conjunctival injection  ENT:  supple, no thrush  Cardiovascular:   normal S1, S2, no murmur, no edema  Respiratory:  clear BS bilaterally, no wheezes, no rales  GI:  soft, non-tender, normal bowel sounds  :  no allen, no CVA tenderness  Musculoskeletal:  no synovitis, normal ROM  Neurologic: awake and alert, normal strength, no focal findings  Skin:  no rash, no erythema, no phlebitis  Heme/Onc: no lymphadenopathy   Psychiatric:  awake, alert, appropriate mood                            13.5   7.74  )-----------( 282      ( 28 Jan 2021 06:45 )             40.7   01-28    141  |  107  |  24<H>  ----------------------------<  120<H>  4.7   |  21<L>  |  0.63    Ca    9.6      28 Jan 2021 06:45    TPro  6.9  /  Alb  4.1  /  TBili  0.2  /  DBili  x   /  AST  19  /  ALT  12  /  AlkPhos  99  01-28    MICROBIOLOGY:      Repeat COVID-19 PCR in process  COVID-19 Ab in process  HCV Ab in process        RADIOLOGY:    < from: CT Chest No Cont (01.28.21 @ 07:46) >  INTERPRETATION:  Reason for Exam:  Covid positive as outpatient. Hypoxic.    CT of the chest was performed from the thoracic inlet to the level of the adrenal glands without contrast injection.    Comparison: July 16, 2019    Tubes/Lines: None.    Mediastinum/Vessels/Heart: Aorta and pulmonary arteries are normal in size. There is no pericardial effusion. No lymphadenopathy. Thyroid gland is unremarkable. Small hiatal hernia.    Lungs/Pleura/Airways: No consolidations, edema, effusion or pneumothorax. Linear and nodular scarring in the lingula is stable.    Visualized abdomen: Unremarkable noncontrast appearance of the upper abdomen    Bones and soft tissues: No suspicious osseous lesions. Degenerative changes noted throughout the spine.    IMPRESSION:    Unremarkable CT chest. No change since July 16, 2019.      < end of copied text >    < from: Xray Chest 1 View- PORTABLE-Urgent (01.27.21 @ 20:20) >  FINDINGS:  Left lower lung hazy opacity compatible with atelectasis and/or pneumonia.  No pleural effusion or pneumothorax.  Heart size cannot be appropriately assessed in this projection  No acute osseous abnormalities.    IMPRESSION:  Left lower lung zone hazy opacity compatible with atelectasis and/or pneumonia.    < end of copied text >

## 2021-01-28 NOTE — CONSULT NOTE ADULT - ASSESSMENT
Assessment:  The patient is a 70 year old female with past medical history of hypothyroidism, dementia, hyperlipidemia, pedal edema, and schizophrenia who presents from [facility] after testing positive COVID-19 today. Patient AOx3. She denies fever, chills, cough, shortness of breath, chest pain, nausea, abdominal discomfort, or diarrhea. Also endorses taste and smell intact. While in emergency department, she was noted to desaturate to 89% while ambulating, improved on nasal cannula. She is currently on 2 liters on nasal cannula. She was admitted to rule out COVID-19.    Plan:  # COVID-19 disease  - Trend CRP, LDH, D-dimer, and Ferritin q48 hours  - Recommend proning for further oxygenation  - Send blood cultures, sputum cultures, and urinalysis with urine cultures  - Trend CBC and CMP daily   - Monitor fever curve  - Maintain airborne and contact precautions  - ID will continue to follow        Dion Davila MD PGY-4   Fellow, Infectious Diseases   Pager: [phone]  If no response, after 5pm and on weekends: Call [phone]   Assessment:  The patient is a 70 year old female with past medical history of hypothyroidism, dementia, hyperlipidemia, pedal edema, and schizophrenia who presents from [facility] after testing positive COVID-19 today. Patient AOx3. She denies fever, chills, cough, shortness of breath, chest pain, nausea, abdominal discomfort, or diarrhea. Also endorses taste and smell intact. While in emergency department, she was noted to desaturate to 89% while ambulating, improved on nasal cannula. She is currently on 2 liters on nasal cannula. She was admitted to rule out COVID-19.    Plan:  # Rule out COVID-19   - First COVID-19 PCR in nursing home likely false positive  - Two negative COVID-19 PCR tests, unlikely patient has COVID-19  - Discuss with infection control in discontinuing isolation  - Discharge planning  - ID will sign off         Dion Davila MD PGY-4   Fellow, Infectious Diseases   Pager: [phone]  If no response, after 5pm and on weekends: Call [phone]

## 2021-01-28 NOTE — PROGRESS NOTE ADULT - ATTENDING COMMENTS
Based on my assessment, this patient’s condition has improved and no longer warrants inpatient status and will be changed to outpatient status prior to being discharged from the hospital.  This decision is based on the following reasons: Based on my assessment, this patient’s condition has improved and no longer warrants inpatient status and will be changed to outpatient status prior to being discharged from the hospital.  This decision is based on the following reasons: Covid test was false positive in [place]. Covid x 2 negative in the hospital

## 2021-01-28 NOTE — CONSULT NOTE ADULT - ATTENDING COMMENTS
70 f with HLD, hypothyroidism, dementia,  and schizophrenia sent from [facility] after testing positive COVID-19 but denies fever, cough, dyspnea, diarrhea  here afebrile, no tachypnea or tachycardia, no documented hypoxia, lowest in chart 92% but pt was started on NC  now on RA and comfortable  COVID PCR negative x 2  chest CT negative    positive COVID test outside with 2 negative PCRs here and a negative chest CT, with no fever or cough or dyspnea, likely false positive  * pt is not requiring O2, was given O2 temporarily for a O2 sast of 89%? but not in the vitals, she is on RA now and very comfortable  * the outside positve test was likely a false positive  * f/u the repeat COVID PCR and COVID IgG    The above assessment and plan was discussed with the primary team    Yessi Cheung MD  Pager [phone]  After 5pm and on weekends call [phone]

## 2021-01-28 NOTE — DISCHARGE NOTE NURSING/CASE MANAGEMENT/SOCIAL WORK - PATIENT PORTAL LINK FT
You can access the FollowMyHealth Patient Portal offered by [facility] by registering at the following website: [email or web address]. By joining [facility]’s FollowMyHealth portal, you will also be able to view your health information using other applications (apps) compatible with our system.

## 2021-01-28 NOTE — DISCHARGE NOTE PROVIDER - CARE PROVIDER_API CALL
Jose Armando Leal)  Internal Medicine  [address] [place] [address], [address] [place] [address]  Phone: [phone]  Fax: [phone]  Follow Up Time:

## 2021-01-28 NOTE — DISCHARGE NOTE PROVIDER - NSDCCPCAREPLAN_GEN_ALL_CORE_FT
PRINCIPAL DISCHARGE DIAGNOSIS  Diagnosis: COVID-19 determined by clinical diagnostic criteria  Assessment and Plan of Treatment: at [facility] covid pcr neg x 2  drink plenty of fluid

## 2021-01-28 NOTE — DISCHARGE NOTE PROVIDER - NSDCMRMEDTOKEN_GEN_ALL_CORE_FT
Colace 100 mg oral capsule: 1 cap(s) orally 2 times a day  Lasix 20 mg oral tablet: 1 tab(s) orally once a day  levothyroxine 88 mcg (0.088 mg) oral tablet: 1 tab(s) orally once a day  LORazepam 1 mg oral tablet: 0.5 tab(s) orally 2 times a day  risperiDONE 1 mg/mL oral solution: 0.5 milliliter(s) orally once a day  Zocor 20 mg oral tablet: 1 tab(s) orally once a day (at bedtime)

## 2021-01-28 NOTE — ED ADULT NURSE REASSESSMENT NOTE - NS ED NURSE REASSESS COMMENT FT1
Charged MICHAEL Comer called to discussed. Hospitalist for patient will decide if high or low suspicion
Pt RTM medicine at this time. maintained on 2L NC with pulse ox >95% on room air.
Pt ambulated to bedside commode w/ steady gait. No complains of difficulty breathing. No acute distress.
Pt covid swab returned negative. As per [place], where the patient tested postitive. MD Gannon and MD Almazan have decided the patient will be changed to low suspicion for COVID. Pt maintaining well on room air above 96%.
Pt reswabbed for COVID due to high suspicion. Pt given tooth brush and tooth past. Pt resting comfortably, awaiting bed placement
Pt now RTM two negative COVID swabs admitted to medicine low suspicion of COVID.

## 2021-01-28 NOTE — DISCHARGE NOTE PROVIDER - HOSPITAL COURSE
70 year old female with past medical history of hypothyroidism, dementia, hyperlipidemia, pedal edema, and schizophrenia who presents from [facility] after testing positive COVID-19 today. Patient AOx3. She denies fever, chills, cough, shortness of breath, chest pain, nausea, abdominal discomfort, or diarrhea. Also endorses taste and smell intact. While in emergency department, she was noted to desaturate to 89% while ambulating, improved on nasal cannula. now able to walk    # Rule out COVID-19   - First COVID-19 PCR in nursing home likely false positive  - Two negative COVID-19 PCR tests, unlikely patient has COVID-19  - Discuss with infection control in discontinuing isolation  - Discharge planning  - ID will sign off  no need for admission         L   Dr Leal has clear for discharge     Warm/Dry

## 2021-01-28 NOTE — CHART NOTE - NSCHARTNOTEFT_GEN_A_CORE
Chart reviewed.  The patine is clinically stable for discharge.  Discussed discharge plan with team, the patient is clinically stable to return to [facility].  spoke with Asis at [facility] approved return back to assisted living with negative COVID results and discharge summary faxed .   arranged for transportation via [facility].   will remain available.

## 2022-01-28 NOTE — ED CLERICAL - BED REQUESTED
27-Jan-2021 22:41 Shelli Young)  Critical Care Medicine; Pulmonary Disease  [address] [place] [address]  Phone: [phone]  Fax: [phone]  Follow Up Time:

## 2023-05-03 NOTE — PATIENT PROFILE ADULT. - FUNCTIONAL LEVEL PRIOR: EATING
Covid vax: x 6  CRC: 10/2020  Mammogram: 3/2022-ordered  Pap: hysterectomy  Lmp: hysterectomy     (4) completely dependent

## 2023-05-31 NOTE — ED PROVIDER NOTE - ACUTE OR EVOLVING MI?
The patient presents for hospital f/u appt.  H/o ileo-colonic crohn's disease.  dx over 15 years ago.  had partial colon resection with colostomy.  had benign abd tumor causing abd pain that was removed in [facility] and now has double barrel ileostomy.  he had increasing ostomy output with electrolyte abnormalities and creatinine elevation which led to recent hospitalization.  no active crohn's disease on ct scan.  he has been on otc hemeopathic medication for diarrhea since yesterday which has helped slow down output from ostomy.  reports good appetite recently. no

## 2023-07-16 ENCOUNTER — INPATIENT (INPATIENT)
Facility: HOSPITAL | Age: 73
LOS: 1 days | Discharge: NURSING, REHAB OR LONG-TERM CARE | DRG: 689 | End: 2023-07-18
Attending: STUDENT IN AN ORGANIZED HEALTH CARE EDUCATION/TRAINING PROGRAM | Admitting: STUDENT IN AN ORGANIZED HEALTH CARE EDUCATION/TRAINING PROGRAM
Payer: MEDICARE

## 2023-07-16 VITALS
WEIGHT: 179.9 LBS | RESPIRATION RATE: 17 BRPM | HEIGHT: 68 IN | SYSTOLIC BLOOD PRESSURE: 143 MMHG | OXYGEN SATURATION: 94 % | TEMPERATURE: 99 F | HEART RATE: 80 BPM | DIASTOLIC BLOOD PRESSURE: 82 MMHG

## 2023-07-16 DIAGNOSIS — N39.0 URINARY TRACT INFECTION, SITE NOT SPECIFIED: ICD-10-CM

## 2023-07-16 DIAGNOSIS — G93.41 METABOLIC ENCEPHALOPATHY: ICD-10-CM

## 2023-07-16 DIAGNOSIS — F20.9 SCHIZOPHRENIA, UNSPECIFIED: ICD-10-CM

## 2023-07-16 DIAGNOSIS — R09.02 HYPOXEMIA: ICD-10-CM

## 2023-07-16 DIAGNOSIS — R41.82 ALTERED MENTAL STATUS, UNSPECIFIED: ICD-10-CM

## 2023-07-16 LAB
ALBUMIN SERPL ELPH-MCNC: 4.3 G/DL — SIGNIFICANT CHANGE UP (ref 3.3–5)
ALP SERPL-CCNC: 91 U/L — SIGNIFICANT CHANGE UP (ref 40–120)
ALT FLD-CCNC: 14 U/L — SIGNIFICANT CHANGE UP (ref 10–45)
ANION GAP SERPL CALC-SCNC: 14 MMOL/L — SIGNIFICANT CHANGE UP (ref 5–17)
APPEARANCE UR: CLEAR — SIGNIFICANT CHANGE UP
APTT BLD: 28 SEC — SIGNIFICANT CHANGE UP (ref 27.5–35.5)
AST SERPL-CCNC: 25 U/L — SIGNIFICANT CHANGE UP (ref 10–40)
BACTERIA # UR AUTO: ABNORMAL
BASE EXCESS BLDV CALC-SCNC: 0.1 MMOL/L — SIGNIFICANT CHANGE UP (ref -2–3)
BASOPHILS # BLD AUTO: 0.03 K/UL — SIGNIFICANT CHANGE UP (ref 0–0.2)
BASOPHILS NFR BLD AUTO: 0.3 % — SIGNIFICANT CHANGE UP (ref 0–2)
BILIRUB SERPL-MCNC: 0.2 MG/DL — SIGNIFICANT CHANGE UP (ref 0.2–1.2)
BILIRUB UR-MCNC: NEGATIVE — SIGNIFICANT CHANGE UP
BUN SERPL-MCNC: 15 MG/DL — SIGNIFICANT CHANGE UP (ref 7–23)
CA-I SERPL-SCNC: 1.2 MMOL/L — SIGNIFICANT CHANGE UP (ref 1.15–1.33)
CALCIUM SERPL-MCNC: 9.3 MG/DL — SIGNIFICANT CHANGE UP (ref 8.4–10.5)
CHLORIDE BLDV-SCNC: 105 MMOL/L — SIGNIFICANT CHANGE UP (ref 96–108)
CHLORIDE SERPL-SCNC: 105 MMOL/L — SIGNIFICANT CHANGE UP (ref 96–108)
CO2 BLDV-SCNC: 26 MMOL/L — SIGNIFICANT CHANGE UP (ref 22–26)
CO2 SERPL-SCNC: 20 MMOL/L — LOW (ref 22–31)
COLOR SPEC: COLORLESS — SIGNIFICANT CHANGE UP
CREAT SERPL-MCNC: 0.81 MG/DL — SIGNIFICANT CHANGE UP (ref 0.5–1.3)
DIFF PNL FLD: NEGATIVE — SIGNIFICANT CHANGE UP
EGFR: 76 ML/MIN/1.73M2 — SIGNIFICANT CHANGE UP
EOSINOPHIL # BLD AUTO: 0.02 K/UL — SIGNIFICANT CHANGE UP (ref 0–0.5)
EOSINOPHIL NFR BLD AUTO: 0.2 % — SIGNIFICANT CHANGE UP (ref 0–6)
EPI CELLS # UR: 3 /HPF — SIGNIFICANT CHANGE UP
GAS PNL BLDV: 136 MMOL/L — SIGNIFICANT CHANGE UP (ref 136–145)
GAS PNL BLDV: SIGNIFICANT CHANGE UP
GAS PNL BLDV: SIGNIFICANT CHANGE UP
GLUCOSE BLDV-MCNC: 122 MG/DL — HIGH (ref 70–99)
GLUCOSE SERPL-MCNC: 119 MG/DL — HIGH (ref 70–99)
GLUCOSE UR QL: NEGATIVE — SIGNIFICANT CHANGE UP
HCO3 BLDV-SCNC: 25 MMOL/L — SIGNIFICANT CHANGE UP (ref 22–29)
HCT VFR BLD CALC: 37.9 % — SIGNIFICANT CHANGE UP (ref 34.5–45)
HCT VFR BLDA CALC: 38 % — SIGNIFICANT CHANGE UP (ref 34.5–46.5)
HGB BLD CALC-MCNC: 12.6 G/DL — SIGNIFICANT CHANGE UP (ref 11.7–16.1)
HGB BLD-MCNC: 12.3 G/DL — SIGNIFICANT CHANGE UP (ref 11.5–15.5)
IMM GRANULOCYTES NFR BLD AUTO: 0.3 % — SIGNIFICANT CHANGE UP (ref 0–0.9)
INR BLD: 1.03 RATIO — SIGNIFICANT CHANGE UP (ref 0.88–1.16)
KETONES UR-MCNC: NEGATIVE — SIGNIFICANT CHANGE UP
LACTATE BLDV-MCNC: 1.2 MMOL/L — SIGNIFICANT CHANGE UP (ref 0.5–2)
LEUKOCYTE ESTERASE UR-ACNC: ABNORMAL
LYMPHOCYTES # BLD AUTO: 1.45 K/UL — SIGNIFICANT CHANGE UP (ref 1–3.3)
LYMPHOCYTES # BLD AUTO: 15.5 % — SIGNIFICANT CHANGE UP (ref 13–44)
MCHC RBC-ENTMCNC: 28.2 PG — SIGNIFICANT CHANGE UP (ref 27–34)
MCHC RBC-ENTMCNC: 32.5 GM/DL — SIGNIFICANT CHANGE UP (ref 32–36)
MCV RBC AUTO: 86.9 FL — SIGNIFICANT CHANGE UP (ref 80–100)
MONOCYTES # BLD AUTO: 0.59 K/UL — SIGNIFICANT CHANGE UP (ref 0–0.9)
MONOCYTES NFR BLD AUTO: 6.3 % — SIGNIFICANT CHANGE UP (ref 2–14)
NEUTROPHILS # BLD AUTO: 7.25 K/UL — SIGNIFICANT CHANGE UP (ref 1.8–7.4)
NEUTROPHILS NFR BLD AUTO: 77.4 % — HIGH (ref 43–77)
NITRITE UR-MCNC: NEGATIVE — SIGNIFICANT CHANGE UP
NRBC # BLD: 0 /100 WBCS — SIGNIFICANT CHANGE UP (ref 0–0)
NT-PROBNP SERPL-SCNC: 94 PG/ML — SIGNIFICANT CHANGE UP (ref 0–300)
PCO2 BLDV: 39 MMHG — SIGNIFICANT CHANGE UP (ref 39–42)
PH BLDV: 7.41 — SIGNIFICANT CHANGE UP (ref 7.32–7.43)
PH UR: 6.5 — SIGNIFICANT CHANGE UP (ref 5–8)
PLATELET # BLD AUTO: 315 K/UL — SIGNIFICANT CHANGE UP (ref 150–400)
PO2 BLDV: 53 MMHG — HIGH (ref 25–45)
POTASSIUM BLDV-SCNC: 3.8 MMOL/L — SIGNIFICANT CHANGE UP (ref 3.5–5.1)
POTASSIUM SERPL-MCNC: 4.6 MMOL/L — SIGNIFICANT CHANGE UP (ref 3.5–5.3)
POTASSIUM SERPL-SCNC: 4.6 MMOL/L — SIGNIFICANT CHANGE UP (ref 3.5–5.3)
PROT SERPL-MCNC: 7.2 G/DL — SIGNIFICANT CHANGE UP (ref 6–8.3)
PROT UR-MCNC: NEGATIVE — SIGNIFICANT CHANGE UP
PROTHROM AB SERPL-ACNC: 11.9 SEC — SIGNIFICANT CHANGE UP (ref 10.5–13.4)
RAPID RVP RESULT: SIGNIFICANT CHANGE UP
RBC # BLD: 4.36 M/UL — SIGNIFICANT CHANGE UP (ref 3.8–5.2)
RBC # FLD: 15.7 % — HIGH (ref 10.3–14.5)
RBC CASTS # UR COMP ASSIST: 0 /HPF — SIGNIFICANT CHANGE UP (ref 0–4)
SAO2 % BLDV: 82 % — SIGNIFICANT CHANGE UP (ref 67–88)
SARS-COV-2 RNA SPEC QL NAA+PROBE: SIGNIFICANT CHANGE UP
SODIUM SERPL-SCNC: 139 MMOL/L — SIGNIFICANT CHANGE UP (ref 135–145)
SP GR SPEC: 1.01 — LOW (ref 1.01–1.02)
TROPONIN T, HIGH SENSITIVITY RESULT: 8 NG/L — SIGNIFICANT CHANGE UP (ref 0–51)
UROBILINOGEN FLD QL: NEGATIVE — SIGNIFICANT CHANGE UP
WBC # BLD: 9.37 K/UL — SIGNIFICANT CHANGE UP (ref 3.8–10.5)
WBC # FLD AUTO: 9.37 K/UL — SIGNIFICANT CHANGE UP (ref 3.8–10.5)
WBC UR QL: 3 /HPF — SIGNIFICANT CHANGE UP (ref 0–5)

## 2023-07-16 PROCEDURE — 99223 1ST HOSP IP/OBS HIGH 75: CPT

## 2023-07-16 PROCEDURE — 71045 X-RAY EXAM CHEST 1 VIEW: CPT | Mod: 26

## 2023-07-16 PROCEDURE — 70450 CT HEAD/BRAIN W/O DYE: CPT | Mod: 26,MA

## 2023-07-16 PROCEDURE — 99285 EMERGENCY DEPT VISIT HI MDM: CPT | Mod: GC

## 2023-07-16 RX ORDER — SIMVASTATIN 20 MG/1
20 TABLET, FILM COATED ORAL AT BEDTIME
Refills: 0 | Status: DISCONTINUED | OUTPATIENT
Start: 2023-07-16 | End: 2023-07-18

## 2023-07-16 RX ORDER — SODIUM CHLORIDE 9 MG/ML
1000 INJECTION, SOLUTION INTRAVENOUS
Refills: 0 | Status: DISCONTINUED | OUTPATIENT
Start: 2023-07-16 | End: 2023-07-17

## 2023-07-16 RX ORDER — CEFTRIAXONE 500 MG/1
1000 INJECTION, POWDER, FOR SOLUTION INTRAMUSCULAR; INTRAVENOUS EVERY 24 HOURS
Refills: 0 | Status: DISCONTINUED | OUTPATIENT
Start: 2023-07-16 | End: 2023-07-18

## 2023-07-16 RX ORDER — LEVOTHYROXINE SODIUM 125 MCG
88 TABLET ORAL DAILY
Refills: 0 | Status: DISCONTINUED | OUTPATIENT
Start: 2023-07-16 | End: 2023-07-17

## 2023-07-16 RX ORDER — ENOXAPARIN SODIUM 100 MG/ML
40 INJECTION SUBCUTANEOUS EVERY 24 HOURS
Refills: 0 | Status: DISCONTINUED | OUTPATIENT
Start: 2023-07-16 | End: 2023-07-18

## 2023-07-16 RX ORDER — RISPERIDONE 4 MG/1
0.5 TABLET ORAL DAILY
Refills: 0 | Status: DISCONTINUED | OUTPATIENT
Start: 2023-07-16 | End: 2023-07-17

## 2023-07-16 RX ORDER — CEFTRIAXONE 500 MG/1
1000 INJECTION, POWDER, FOR SOLUTION INTRAMUSCULAR; INTRAVENOUS ONCE
Refills: 0 | Status: COMPLETED | OUTPATIENT
Start: 2023-07-16 | End: 2023-07-16

## 2023-07-16 RX ADMIN — CEFTRIAXONE 100 MILLIGRAM(S): 500 INJECTION, POWDER, FOR SOLUTION INTRAMUSCULAR; INTRAVENOUS at 18:02

## 2023-07-16 NOTE — H&P ADULT - NSHPREVIEWOFSYSTEMS_GEN_ALL_CORE
REVIEW OF SYSTEMS:  CONSTITUTIONAL: No weakness. No fevers. No chills. No rigors. No poor appetite.  EYES: No blurry or double vision. No eye pain.  ENT: No hearing difficulty. No sore throat. No Sinusitis/rhinorrhea.   NECK: No pain. No stiffness/rigidity.  CARDIAC: No chest pain. No palpitations. No lightheadedness. No syncope.  RESPIRATORY: No cough. No SOB.   GASTROINTESTINAL: No abdominal pain. No nausea. No vomiting. No diarrhea. No constipation.   GENITOURINARY: No dysuria. No frequency. No oliguria.  NEUROLOGICAL: No numbness/tingling. No focal weakness. No headache. No unsteady gait.  BACK: No back pain. No flank pain.  EXTREMITIES: No lower extremity edema. Full ROM. No joint pain.  SKIN: No rashes. No itching. No other lesions.  ALLERGIC: No lip swelling. No hives.  All other review of systems is negative unless indicated above.  Unless indicated above, unable to assess ROS 2/2

## 2023-07-16 NOTE — ED PROVIDER NOTE - ATTENDING CONTRIBUTION TO CARE
hx of schizophrenia, presents to the ER w/ hypoxia, to 80 percent pt w/ findings c/f sepsis, pt w/ no fevers, no chills, no nausea no vomiting, pt a limited historian pt w/ clear lungs diminished breath sounds, no lower leg edema, pt moving arms and leg spontaeously, pt aaox3 but is slow to respond to questions, temp is 99, pt possibly on tylenol which could be masking temp., plan for labs and reassessment. dispo pedning results. possible sepsis admission will obtain labs and reassessmen t hx of schizophrenia, presents to the ER w/ hypoxia, to 80 percent pt w/ findings c/f sepsis, pt w/ no fevers, no chills, no nausea no vomiting, pt a limited historian chronically ill appearing intermittent wandering while in the ER requiring one to one observation  pt w/ clear lungs diminished breath sounds, no lower leg edema, pt moving arms and leg spontaeously, pt aaox3 but is slow to respond to questions, temp is 99, pt possibly on tylenol which could be masking temp., plan for labs and reassessment. dispo pending results. concern for uti, vs ams, 2/2 infectious vs metabolic vs toxic etiology

## 2023-07-16 NOTE — ED PROVIDER NOTE - NS ED ROS FT
General: denies fever, chills  CV: denies chest pain, palpitations  Resp: denies difficulty breathing, cough  Abdominal: denies nausea, vomiting, diarrhea, abdominal pain  : denies hematuria, urinary pain or discharge  MSK: denies muscle aches, leg swelling  Neuro: denies headaches, numbness, tingling  Skin: denies rashes, bruises

## 2023-07-16 NOTE — ED PROVIDER NOTE - CLINICAL SUMMARY MEDICAL DECISION MAKING FREE TEXT BOX
73-year-old female with past medical history of dementia, schizophrenia, hypothyroidism, hyperlipidemia presenting from [facility] with altered mental status and hypoxia. Patient found to be hypoxic at facility as well as altered.  Is now ANO x4 but slow to answer questions.  No known trauma or head injury.  Temp 99 rectally.  Lungs clear, normal heart sounds, soft nontender abdomen, no lower extremity swelling or tenderness.  Concern for sepsis versus other cause of altered mental status toxic [place] versus intracranial pathology.  Will order sepsis work-up and CT head Noncon.  Will also placed on constant observation given patient has been walking around, history of dementia.

## 2023-07-16 NOTE — ED ADULT NURSE NOTE - NSFALLUNIVINTERV_ED_ALL_ED
Bed/Stretcher in lowest position, wheels locked, appropriate side rails in place/Call bell, personal items and telephone in reach/Instruct patient to call for assistance before getting out of bed/chair/stretcher/Non-slip footwear applied when patient is off stretcher/[place] to call system/Physically safe environment - no spills, clutter or unnecessary equipment/Purposeful proactive rounding/Room/bathroom lighting operational, light cord in reach

## 2023-07-16 NOTE — H&P ADULT - NSHPSOCIALHISTORY_GEN_ALL_CORE
Denies hx of smoking or etoh  Lives in [facility]  No children, not   Has two brothers, one in [place] one in [facility]  Emergency contact is ?niece, Tomas Eng  Ambulates independently w/o assistive device

## 2023-07-16 NOTE — ED ADULT NURSE REASSESSMENT NOTE - NS ED NURSE REASSESS COMMENT FT1
Received report MICHAEL Shah. Pt AOx4 with stable VS. Pt denies sob. Comfort care and safety measures provided. Pt admitted, pending bed.

## 2023-07-16 NOTE — H&P ADULT - ASSESSMENT
73F c hx hypothyroidism, dementia, hld, schizophrenia presents from [place] with confusion and reported hypoxia.

## 2023-07-16 NOTE — ED ADULT NURSE NOTE - OBJECTIVE STATEMENT
73 y.o F BIB EMS from [facility] p/w c/o SOB. A+Ox4. Pt states is unsure why she was sent from the nursing home. Per EMS states the pt was satting at 80% RA, was put on 6L NC from nursing home, and EMS states O2 tank was empty upon arrival to nursing home. Unsure of how long pt was satting at 80%. Put on 2L NC from EMS, O2 sat back to 95%. No dependent edema noted, No CP. LS clear B/L. Cardiac monitor ongoing, NSR. No known sick contacts.

## 2023-07-16 NOTE — H&P ADULT - HISTORY OF PRESENT ILLNESS
73F c hx hypothyroidism, dementia, hld, schizophrenia presents from [place] with confusion and reported hypoxia.    Pt currently has no complaints. Pt is A&Ox2-3, but states she doesn't know why she is in the hospital. Reported O2 saturation in the 80s on RA. In the Ed, pt placed on 1:1 for talking gown off and going to bathroom repeatedly.

## 2023-07-16 NOTE — H&P ADULT - NSHPLABSRESULTS_GEN_ALL_CORE
Personally reviewed old records.  Personally reviewed labs.  Personally reviewed imaging.  Personally reviewed EKG. nsr rate 66. qtc 415.                          12.3   9.37  )-----------( 315      ( 2023 15:59 )             37.9       07-16    139  |  105  |  15  ----------------------------<  119<H>  4.6   |  20<L>  |  0.81    Ca    9.3      2023 15:59    TPro  7.2  /  Alb  4.3  /  TBili  0.2  /  DBili  x   /  AST  25  /  ALT  14  /  AlkPhos  91  07-16            LIVER FUNCTIONS - ( 2023 15:59 )  Alb: 4.3 g/dL / Pro: 7.2 g/dL / ALK PHOS: 91 U/L / ALT: 14 U/L / AST: 25 U/L / GGT: x             PT/INR - ( 2023 15:59 )   PT: 11.9 sec;   INR: 1.03 ratio         PTT - ( 2023 15:59 )  PTT:28.0 sec    Urinalysis Basic - ( 2023 15:59 )    Color: Colorless / Appearance: Clear / S.006 / pH: x  Gluc: 119 mg/dL / Ketone: Negative  / Bili: Negative / Urobili: Negative   Blood: x / Protein: Negative / Nitrite: Negative   Leuk Esterase: Large / RBC: 0 /hpf / WBC 3 /HPF   Sq Epi: x / Non Sq Epi: x / Bacteria: Many

## 2023-07-16 NOTE — H&P ADULT - PROBLEM SELECTOR PLAN 3
- ddimer neg  - poss aspiration event?  - cont to monitor clinically  - if fever or development of cough, would get CT chest to eval for pna

## 2023-07-16 NOTE — ED PROVIDER NOTE - OBJECTIVE STATEMENT
This is a 73-year-old female with past medical history of dementia, schizophrenia, hypothyroidism, hyperlipidemia presenting from [facility] with altered mental status and hypoxia.  Today at facility patient was noted to be altered and found to be satting in the 80s on room air.  Was placed on oxygen but the tank may have been empty when EMS arrived.  Patient limited historian but denies fevers, chills, nausea, vomiting, CP, SOB, dysuria.

## 2023-07-16 NOTE — H&P ADULT - NSHPPHYSICALEXAM_GEN_ALL_CORE
PHYSICAL EXAM:   GENERAL: Alert. Not confused. No acute distress. Not thin. Not cachectic. Not obese.  HEAD:  Atraumatic. Normocephalic.  EYES: EOMI. PERRLA. Normal conjunctiva/sclera.  ENT: Neck supple. No JVD. Moist oral mucosa. Not edentulous. No thrush. red nose. denies blowing nose.  LYMPH: Normal supraclavicular/cervical lymph nodes.   CARDIAC: Not tachy, Not jared. Regular rhythm. Not irregularly irregular. S1. S2. No murmur. No rub. No distant heart sounds.  LUNG/CHEST: CTAB. BS equal bilaterally. No wheezes. No rales. No rhonchi.  ABDOMEN: Soft. No tenderness. No distension. No fluid wave. Normal bowel sounds.  BACK: No midline/vertebral tenderness. No flank tenderness.  VASCULAR: +2 b/l radial or ulnar pulses. Palpable DP pulses.  EXTREMITIES:  No clubbing. No cyanosis. +1 b/l LE nonpitting edema. Moving all 4.  NEUROLOGY: A&Ox3. Non-focal exam. Cranial nerves intact. Normal speech. Sensation intact.  PSYCH: Normal behavior. Normal affect.  SKIN: No jaundice. No erythema. No rash/lesion.  ICU Vital Signs Last 24 Hrs  T(C): 37.1 (16 Jul 2023 20:39), Max: 37.2 (16 Jul 2023 14:29)  T(F): 98.8 (16 Jul 2023 20:39), Max: 99 (16 Jul 2023 14:29)  HR: 68 (16 Jul 2023 20:39) (68 - 80)  BP: 133/76 (16 Jul 2023 20:39) (133/76 - 143/82)  BP(mean): --  ABP: --  ABP(mean): --  RR: 18 (16 Jul 2023 20:39) (17 - 18)  SpO2: 93% (16 Jul 2023 20:39) (93% - 94%)    O2 Parameters below as of 16 Jul 2023 20:39  Patient On (Oxygen Delivery Method): room air          I&O's Summary

## 2023-07-17 ENCOUNTER — TRANSCRIPTION ENCOUNTER (OUTPATIENT)
Age: 73
End: 2023-07-17

## 2023-07-17 PROCEDURE — 99232 SBSQ HOSP IP/OBS MODERATE 35: CPT

## 2023-07-17 RX ORDER — RISPERIDONE 4 MG/1
0.5 TABLET ORAL DAILY
Refills: 0 | Status: DISCONTINUED | OUTPATIENT
Start: 2023-07-17 | End: 2023-07-18

## 2023-07-17 RX ORDER — CITALOPRAM 10 MG/1
20 TABLET, FILM COATED ORAL DAILY
Refills: 0 | Status: DISCONTINUED | OUTPATIENT
Start: 2023-07-17 | End: 2023-07-18

## 2023-07-17 RX ORDER — CITALOPRAM 10 MG/1
1 TABLET, FILM COATED ORAL
Refills: 0 | DISCHARGE

## 2023-07-17 RX ORDER — DOCUSATE SODIUM 100 MG
1 CAPSULE ORAL
Qty: 0 | Refills: 0 | DISCHARGE

## 2023-07-17 RX ORDER — LEVOTHYROXINE SODIUM 125 MCG
1 TABLET ORAL
Qty: 0 | Refills: 0 | DISCHARGE

## 2023-07-17 RX ORDER — FUROSEMIDE 40 MG
1 TABLET ORAL
Qty: 0 | Refills: 0 | DISCHARGE

## 2023-07-17 RX ORDER — LEVOTHYROXINE SODIUM 125 MCG
75 TABLET ORAL DAILY
Refills: 0 | Status: DISCONTINUED | OUTPATIENT
Start: 2023-07-17 | End: 2023-07-18

## 2023-07-17 RX ORDER — RISPERIDONE 4 MG/1
0.5 TABLET ORAL
Refills: 0 | DISCHARGE

## 2023-07-17 RX ORDER — LEVOTHYROXINE SODIUM 125 MCG
1 TABLET ORAL
Refills: 0 | DISCHARGE

## 2023-07-17 RX ORDER — SIMVASTATIN 20 MG/1
1 TABLET, FILM COATED ORAL
Refills: 0 | DISCHARGE

## 2023-07-17 RX ORDER — OMEPRAZOLE 10 MG/1
1 CAPSULE, DELAYED RELEASE ORAL
Refills: 0 | DISCHARGE

## 2023-07-17 RX ORDER — SIMVASTATIN 20 MG/1
1 TABLET, FILM COATED ORAL
Qty: 0 | Refills: 0 | DISCHARGE

## 2023-07-17 RX ORDER — RISPERIDONE 4 MG/1
0.5 TABLET ORAL DAILY
Refills: 0 | Status: DISCONTINUED | OUTPATIENT
Start: 2023-07-17 | End: 2023-07-17

## 2023-07-17 RX ORDER — PANTOPRAZOLE SODIUM 20 MG/1
40 TABLET, DELAYED RELEASE ORAL
Refills: 0 | Status: DISCONTINUED | OUTPATIENT
Start: 2023-07-17 | End: 2023-07-18

## 2023-07-17 RX ADMIN — CEFTRIAXONE 100 MILLIGRAM(S): 500 INJECTION, POWDER, FOR SOLUTION INTRAMUSCULAR; INTRAVENOUS at 12:56

## 2023-07-17 RX ADMIN — SIMVASTATIN 20 MILLIGRAM(S): 20 TABLET, FILM COATED ORAL at 21:44

## 2023-07-17 RX ADMIN — SODIUM CHLORIDE 75 MILLILITER(S): 9 INJECTION, SOLUTION INTRAVENOUS at 00:10

## 2023-07-17 RX ADMIN — RISPERIDONE 0.5 MILLIGRAM(S): 4 TABLET ORAL at 12:56

## 2023-07-17 RX ADMIN — Medication 0.5 MILLIGRAM(S): at 17:35

## 2023-07-17 RX ADMIN — CITALOPRAM 20 MILLIGRAM(S): 10 TABLET, FILM COATED ORAL at 12:56

## 2023-07-17 RX ADMIN — Medication 75 MICROGRAM(S): at 12:56

## 2023-07-17 RX ADMIN — PANTOPRAZOLE SODIUM 40 MILLIGRAM(S): 20 TABLET, DELAYED RELEASE ORAL at 12:56

## 2023-07-17 RX ADMIN — ENOXAPARIN SODIUM 40 MILLIGRAM(S): 100 INJECTION SUBCUTANEOUS at 05:40

## 2023-07-17 RX ADMIN — Medication 88 MICROGRAM(S): at 05:41

## 2023-07-17 RX ADMIN — Medication 0.5 MILLIGRAM(S): at 00:10

## 2023-07-17 RX ADMIN — Medication 0.5 MILLIGRAM(S): at 05:41

## 2023-07-17 NOTE — PHYSICAL THERAPY INITIAL EVALUATION ADULT - ADDITIONAL COMMENTS
lives at [facility] ([facility]? as per last EMR note, at [facility] with staff); ambulates without AD, unsure of how much supervision staff provides

## 2023-07-17 NOTE — PROGRESS NOTE ADULT - SUBJECTIVE AND OBJECTIVE BOX
INCOMPLETE NOTE    Linda Michaud M.D.  Division of Hospital Medicine  Available on [employer] TEAMS    SUBJECTIVE / ACUTE INTERVAL EVENTS: Patient seen and examined. No overnight events. No subjective complaints.     OBJECTIVE:   Vital Signs Last 24 Hrs  T(F): 97.8 (2023 06:55), Max: 208.5 (2023 04:52)  HR: 53 (2023 06:55) (53 - 80)  BP: 123/77 (2023 06:55) (123/76 - 143/82)  RR: 18 (2023 06:55) (17 - 18)  SpO2: 93% (2023 06:55) (92% - 95%)    Parameters below as of 2023 06:55  Patient On (Oxygen Delivery Method): room air    Physical Examination:  GEN: thin man, laying in stretcher in NAD  PSYCH: A&Ox3, mood and affect appear appropriate   SKIN: intact, no e/o rash  NEURO: no focal neurologic deficits appreciated; CN II-XII intact, sensation intact B/L, motor 5/5 in all mm groups  EYES: PERRL, anicteric, EOMI, no vertical/horizontal nystagmus  HEAD: NC, AT  NECK: supple  RESPI: no accessory muscle use, B/L air entry, CTAB   CARDIO: regular rate/rhythm, no LE edema B/L  ABD: soft, NT, ND, +BS  EXT: patient able to move all extremities spontaneously  VASC: peripheral pulses palpated    Labs:                      12.3   9.37  )-----------( 315      ( 2023 15:59 )             37.9     07-16  139  |  105  |  15  ----------------------------<  119<H>  4.6   |  20<L>  |  0.81    Ca    9.3      2023 15:59    TPro  7.2  /  Alb  4.3  /  TBili  0.2  /  DBili  x   /  AST  25  /  ALT  14  /  AlkPhos  91  07-16    PT/INR - ( 2023 15:59 )   PT: 11.9 sec;   INR: 1.03 ratio    PTT - ( 2023 15:59 )  PTT:28.0 sec    Urinalysis Basic - ( 2023 15:59 )  Color: Colorless / Appearance: Clear / S.006 / pH: x  Gluc: 119 mg/dL / Ketone: Negative  / Bili: Negative / Urobili: Negative   Blood: x / Protein: Negative / Nitrite: Negative   Leuk Esterase: Large / RBC: 0 /hpf / WBC 3 /HPF   Sq Epi: x / Non Sq Epi: x / Bacteria: Many    Care Discussed with Consultants/Other Providers: DOREEN Pompa    MEDICATIONS  (STANDING):  cefTRIAXone   IVPB 1000 milliGRAM(s) IV Intermittent every 24 hours  citalopram 20 milliGRAM(s) Oral daily  enoxaparin Injectable 40 milliGRAM(s) SubCutaneous every 24 hours  lactated ringers. 1000 milliLiter(s) (75 mL/Hr) IV Continuous <Continuous>  levothyroxine 75 MICROGram(s) Oral daily  LORazepam     Tablet 0.5 milliGRAM(s) Oral two times a day  pantoprazole    Tablet 40 milliGRAM(s) Oral before breakfast  risperiDONE   Solution 0.5 milliGRAM(s) Oral daily  simvastatin 20 milliGRAM(s) Oral at bedtime   Linda Michaud M.D.  Division of Hospital Medicine  Available on [employer] TEAMS    SUBJECTIVE / ACUTE INTERVAL EVENTS: Patient seen and examined. No overnight events. No subjective complaints except for mild RLE cramping x1 episode this morning. She is A&Ox4 and does not appear confused or agitated; will need to obtain further collateral re: history of patient's presentation.     OBJECTIVE:   Vital Signs Last 24 Hrs  T(F): 97.8 (2023 06:55), Max: 208.5 (2023 04:52)  HR: 53 (2023 06:55) (53 - 80)  BP: 123/77 (2023 06:55) (123/76 - 143/82)  RR: 18 (2023 06:55) (17 - 18)  SpO2: 93% (2023 06:55) (92% - 95%)    Parameters below as of 2023 06:55  Patient On (Oxygen Delivery Method): room air    Physical Examination:  GEN: elderly woman, sitting up in stretcher in NAD  PSYCH: A&Ox3, mood and affect appear appropriate   NEURO: no focal neurologic deficits appreciated  RESPI: no accessory muscle use, B/L air entry, on RA   CARDIO: regular rate/rhythm, no LE edema B/L  ABD: soft, NT, ND  EXT: patient able to move all extremities spontaneously, no calf tenderness B/L  VASC: peripheral pulses palpated    Labs:                      12.3   9.37  )-----------( 315      ( 2023 15:59 )             37.9     07-16  139  |  105  |  15  ----------------------------<  119<H>  4.6   |  20<L>  |  0.81    Ca    9.3      2023 15:59    TPro  7.2  /  Alb  4.3  /  TBili  0.2  /  DBili  x   /  AST  25  /  ALT  14  /  AlkPhos  91  07-16    PT/INR - ( 2023 15:59 )   PT: 11.9 sec;   INR: 1.03 ratio    PTT - ( 2023 15:59 )  PTT:28.0 sec    Urinalysis Basic - ( 2023 15:59 )  Color: Colorless / Appearance: Clear / S.006 / pH: x  Gluc: 119 mg/dL / Ketone: Negative  / Bili: Negative / Urobili: Negative   Blood: x / Protein: Negative / Nitrite: Negative   Leuk Esterase: Large / RBC: 0 /hpf / WBC 3 /HPF   Sq Epi: x / Non Sq Epi: x / Bacteria: Many    Care Discussed with Consultants/Other Providers: DOREEN Pompa    MEDICATIONS  (STANDING):  cefTRIAXone   IVPB 1000 milliGRAM(s) IV Intermittent every 24 hours  citalopram 20 milliGRAM(s) Oral daily  enoxaparin Injectable 40 milliGRAM(s) SubCutaneous every 24 hours  lactated ringers. 1000 milliLiter(s) (75 mL/Hr) IV Continuous <Continuous>  levothyroxine 75 MICROGram(s) Oral daily  LORazepam     Tablet 0.5 milliGRAM(s) Oral two times a day  pantoprazole    Tablet 40 milliGRAM(s) Oral before breakfast  risperiDONE   Solution 0.5 milliGRAM(s) Oral daily  simvastatin 20 milliGRAM(s) Oral at bedtime

## 2023-07-17 NOTE — PHYSICAL THERAPY INITIAL EVALUATION ADULT - PERTINENT HX OF CURRENT PROBLEM, REHAB EVAL
73F c hx hypothyroidism, dementia, hld, schizophrenia presents from [place] with confusion and reported hypoxia

## 2023-07-17 NOTE — PHYSICAL THERAPY INITIAL EVALUATION ADULT - TRANSFER TRAINING, PT EVAL
GOAL: Patient will perform sit to stand transfers independently with no AD, with proper hand placement

## 2023-07-17 NOTE — PROGRESS NOTE ADULT - ASSESSMENT
73F c hx hypothyroidism, dementia, hld, schizophrenia presents from [place] with reported confusion and reported hypoxia.

## 2023-07-17 NOTE — PROGRESS NOTE ADULT - PROBLEM SELECTOR PLAN 2
- poss 2/2 uti  - cont to monitor  - appears improved, need to obtain collateral re: if this is a return to her baseline  - D/C 1:1

## 2023-07-17 NOTE — PROGRESS NOTE ADULT - PROBLEM SELECTOR PLAN 3
- reportedly but patient has been satting well on RA here  - ddimer neg  - cont to monitor clinically

## 2023-07-18 ENCOUNTER — TRANSCRIPTION ENCOUNTER (OUTPATIENT)
Age: 73
End: 2023-07-18

## 2023-07-18 VITALS
DIASTOLIC BLOOD PRESSURE: 67 MMHG | OXYGEN SATURATION: 94 % | TEMPERATURE: 98 F | SYSTOLIC BLOOD PRESSURE: 104 MMHG | HEART RATE: 69 BPM | RESPIRATION RATE: 18 BRPM

## 2023-07-18 PROCEDURE — 85610 PROTHROMBIN TIME: CPT

## 2023-07-18 PROCEDURE — 82803 BLOOD GASES ANY COMBINATION: CPT

## 2023-07-18 PROCEDURE — 82435 ASSAY OF BLOOD CHLORIDE: CPT

## 2023-07-18 PROCEDURE — 82330 ASSAY OF CALCIUM: CPT

## 2023-07-18 PROCEDURE — 85014 HEMATOCRIT: CPT

## 2023-07-18 PROCEDURE — 87086 URINE CULTURE/COLONY COUNT: CPT

## 2023-07-18 PROCEDURE — 84484 ASSAY OF TROPONIN QUANT: CPT

## 2023-07-18 PROCEDURE — 97161 PT EVAL LOW COMPLEX 20 MIN: CPT

## 2023-07-18 PROCEDURE — 71045 X-RAY EXAM CHEST 1 VIEW: CPT

## 2023-07-18 PROCEDURE — 83880 ASSAY OF NATRIURETIC PEPTIDE: CPT

## 2023-07-18 PROCEDURE — 80053 COMPREHEN METABOLIC PANEL: CPT

## 2023-07-18 PROCEDURE — 85730 THROMBOPLASTIN TIME PARTIAL: CPT

## 2023-07-18 PROCEDURE — 85379 FIBRIN DEGRADATION QUANT: CPT

## 2023-07-18 PROCEDURE — 99239 HOSP IP/OBS DSCHRG MGMT >30: CPT

## 2023-07-18 PROCEDURE — 85018 HEMOGLOBIN: CPT

## 2023-07-18 PROCEDURE — 99285 EMERGENCY DEPT VISIT HI MDM: CPT

## 2023-07-18 PROCEDURE — 84132 ASSAY OF SERUM POTASSIUM: CPT

## 2023-07-18 PROCEDURE — 84295 ASSAY OF SERUM SODIUM: CPT

## 2023-07-18 PROCEDURE — 0225U NFCT DS DNA&RNA 21 SARSCOV2: CPT

## 2023-07-18 PROCEDURE — 83605 ASSAY OF LACTIC ACID: CPT

## 2023-07-18 PROCEDURE — 81001 URINALYSIS AUTO W/SCOPE: CPT

## 2023-07-18 PROCEDURE — 85025 COMPLETE CBC W/AUTO DIFF WBC: CPT

## 2023-07-18 PROCEDURE — 82947 ASSAY GLUCOSE BLOOD QUANT: CPT

## 2023-07-18 PROCEDURE — 87186 SC STD MICRODIL/AGAR DIL: CPT

## 2023-07-18 PROCEDURE — 87040 BLOOD CULTURE FOR BACTERIA: CPT

## 2023-07-18 PROCEDURE — 70450 CT HEAD/BRAIN W/O DYE: CPT | Mod: MA

## 2023-07-18 RX ORDER — FUROSEMIDE 40 MG
1 TABLET ORAL
Refills: 0 | DISCHARGE

## 2023-07-18 RX ADMIN — Medication 75 MICROGRAM(S): at 05:35

## 2023-07-18 RX ADMIN — CEFTRIAXONE 100 MILLIGRAM(S): 500 INJECTION, POWDER, FOR SOLUTION INTRAMUSCULAR; INTRAVENOUS at 13:51

## 2023-07-18 RX ADMIN — Medication 0.5 MILLIGRAM(S): at 05:35

## 2023-07-18 RX ADMIN — PANTOPRAZOLE SODIUM 40 MILLIGRAM(S): 20 TABLET, DELAYED RELEASE ORAL at 05:35

## 2023-07-18 RX ADMIN — CITALOPRAM 20 MILLIGRAM(S): 10 TABLET, FILM COATED ORAL at 13:53

## 2023-07-18 RX ADMIN — ENOXAPARIN SODIUM 40 MILLIGRAM(S): 100 INJECTION SUBCUTANEOUS at 05:35

## 2023-07-18 NOTE — DISCHARGE NOTE PROVIDER - NSDCMRMEDTOKEN_GEN_ALL_CORE_FT
Ativan 0.5 mg oral tablet: 1 tab(s) orally 2 times a day  CeleXA 20 mg oral tablet: 1 tab(s) orally once a day  Lasix 20 mg oral tablet: 1 tab(s) orally once a day  omeprazole 20 mg oral delayed release tablet: 1 tab(s) orally once a day  risperiDONE 1 mg/mL oral solution: 0.5 milligram(s) orally once a day  Synthroid 75 mcg (0.075 mg) oral tablet: 1 tab(s) orally once a day  Zocor 20 mg oral tablet: 1 tab(s) orally once a day   Ativan 0.5 mg oral tablet: 1 tab(s) orally 2 times a day  CeleXA 20 mg oral tablet: 1 tab(s) orally once a day  omeprazole 20 mg oral delayed release tablet: 1 tab(s) orally once a day  risperiDONE 1 mg/mL oral solution: 0.5 milligram(s) orally once a day  Synthroid 75 mcg (0.075 mg) oral tablet: 1 tab(s) orally once a day  Zocor 20 mg oral tablet: 1 tab(s) orally once a day

## 2023-07-18 NOTE — CHART NOTE - NSCHARTNOTEFT_GEN_A_CORE
Pt seen and examined, no acute distress, alert and answering questions appropriately, stable for d/c, refer to summary for more info     Carlito Pereira MD  Department of Hospital Medicine

## 2023-07-18 NOTE — DISCHARGE NOTE NURSING/CASE MANAGEMENT/SOCIAL WORK - PATIENT PORTAL LINK FT
You can access the FollowMyHealth Patient Portal offered by [facility] by registering at the following website: [email or web address]. By joining [facility]’s FollowMyHealth portal, you will also be able to view your health information using other applications (apps) compatible with our system.

## 2023-07-18 NOTE — DISCHARGE NOTE PROVIDER - ATTENDING DISCHARGE PHYSICAL EXAMINATION:
Physical Examination:  GEN: elderly woman, sitting up in stretcher in NAD  PSYCH: A&Ox3, mood and affect appear appropriate   NEURO: no focal neurologic deficits appreciated  RESPI: no accessory muscle use, B/L air entry, on RA   CARDIO: regular rate/rhythm, no LE edema B/L  ABD: soft, NT, ND  EXT: patient able to move all extremities spontaneously, no calf tenderness B/L  VASC: peripheral pulses palpated

## 2023-07-18 NOTE — DISCHARGE NOTE PROVIDER - HOSPITAL COURSE
73F c hx hypothyroidism, dementia, hld, schizophrenia presents from [place] with reported confusion and reported hypoxia.       Problem/Plan - 1:  ·  Problem: Acute UTI.   ·  Plan: - cont ceftriaxone x 3 day course  - f/u cultures > NGTD     Problem/Plan - 2:  ·  Problem: Acute metabolic encephalopathy.   ·  Plan: - poss 2/2 uti  - CTH neg for acute findings  - cont to monitor  - appears improved, need to obtain collateral re: if this is a return to her baseline  - D/C 1:1.     Problem/Plan - 3:  ·  Problem: Hypoxia.   ·  Plan: - reportedly but patient has been satting well on RA here  - ddimer neg  - cont to monitor clinically.     Problem/Plan - 4:  ·  Problem: Schizophrenia.   ·  Plan: - cont risperdal, ativan.    INCOMPLETE DISCHARGE** - LIKELY DCP ONCE COMPLETION OF ABX ON 7/19 OR SOONER IF TRANSITION TO ORAL ABX Hypercholesterolemia Monitoring: I explained this is common when taking isotretinoin. We will monitor closely. 73F c hx hypothyroidism, dementia, hld, schizophrenia presents from [place] with reported confusion and reported hypoxia.       Problem/Plan - 1:  ·  Problem: Acute UTI.   ·  Plan: - cont ceftriaxone x 3 day course  - f/u cultures > NGTD     Problem/Plan - 2:  ·  Problem: Acute metabolic encephalopathy.   ·  Plan: - poss 2/2 uti  - CTH neg for acute findings  - cont to monitor  - appears improved, need to obtain collateral re: if this is a return to her baseline  - D/C 1:1.     Problem/Plan - 3:  ·  Problem: Hypoxia.   ·  Plan: - reportedly but patient has been satting well on RA here  - ddimer neg  - cont to monitor clinically.     Problem/Plan - 4:  ·  Problem: Schizophrenia.   ·  Plan: - cont risperdal, ativan.    Cleared by Dr Pereira 73F c hx hypothyroidism, dementia, hld, schizophrenia presents from [place] with reported confusion and reported hypoxia.       Problem/Plan - 1:  ·  Problem: Acute UTI.   ·  Plan: - completed ceftriaxone x 3 day course  - f/u cultures > NGTD     Problem/Plan - 2:  ·  Problem: Acute metabolic encephalopathy.   ·  Plan: - poss 2/2 uti  - CTH neg for acute findings  - cont to monitor  - alert and now in acute distress       Problem/Plan - 3:  ·  Problem: Hypoxia.   ·  Plan: - resolved    - reportedly but patient has been satting well on RA here  - ddimer neg  - cont to monitor clinically.     Problem/Plan - 4:  ·  Problem: Schizophrenia.   ·  Plan: - cont risperdal, ativan.    Cleared by Dr Pereira

## 2023-07-18 NOTE — DISCHARGE NOTE PROVIDER - NSDCCPCAREPLAN_GEN_ALL_CORE_FT
PRINCIPAL DISCHARGE DIAGNOSIS  Diagnosis: AMS (altered mental status)  Assessment and Plan of Treatment: HOME CARE INSTRUCTIONS  What family and friends can do:  To find out if someone is confused ask him or their name, age, and the date. If the person is unsure or answers incorrectly, he or she is confused.  Always introduce yourself, no matter how well the person knows you.  Often remind the person of his or her location.  Place a calendar and clock near the confused person.  Talk about current events and plans for the day.  Try to keep the environment calm, quiet and peaceful.  Make sure the patient keeps follow up appointments with their physician.  PREVENTION  Ways to prevent confusion:  Avoid alcohol.  Eat a balanced diet.  Get enough sleep.  Do not become isolated. Spend time with other people and make plans for your days.  Keep careful watch on your blood sugar levels if you are diabetic.  SEEK IMMEDIATE MEDICAL CARE IF:  You develop severe headaches, repeated vomiting, seizures, blackouts or slurred speech.  There is increasing confusion, weakness, numbness, restlessness or personality changes.  You develop a loss of balance, have marked dizziness, feel uncoordinated or fall.  You have delusions, hallucinations or develop severe anxiety.  Your family members think you need to be rechecked

## 2023-10-05 NOTE — CHART NOTE - NSCHARTNOTEFT_GEN_A_CORE
PT unable to be weened off o2 per pulm rec desat to 80's on r/a  Dr Murray notified suggested low dose lasix daily, DCP on cx for now until Pt stable   Dr Leal notified.   Medicine NP REGINA Boone [phone] Sent message to analisa

## 2024-03-15 NOTE — ED ADULT TRIAGE NOTE - ISOLATION TYPE:
[Eye Discharge] : eye discharge [Eye Redness] : eye redness [Itchy Eyes] : itchy eyes [Nasal Congestion] : nasal congestion [Rash] : rash [Itching] : itching [Negative] : Genitourinary [Fever] : no fever [Chills] : no chills [Malaise] : no malaise Airborne precautions... [Headache] : no headache [Ear Pain] : no ear pain [Nasal Discharge] : no nasal discharge [Mouth Breathing] : no mouth breathing [Sore Throat] : no sore throat [Cyanosis] : no cyanosis [Diaphoresis] : not diaphoretic [Tachypnea] : not tachypneic [Wheezing] : no wheezing [Cough] : no cough

## 2024-03-25 NOTE — ED PROVIDER NOTE - CARDIAC, MLM
[Time Spent: ___ minutes] : I have spent [unfilled] minutes of time on the encounter.
Normal rate, regular rhythm.  Heart sounds S1, S2.  No murmurs, rubs or gallops.

## 2024-07-16 NOTE — ED ADULT NURSE NOTE - NSSISCREENINGQ5_ED_A_ED
07/16/24 1636   Discharge Assessment   Assessment Type Discharge Planning Assessment   Source of Information patient   Do you expect to return to your current living situation? Yes   Do you have help at home or someone to help you manage your care at home? Yes   Who are your caregiver(s) and their phone number(s)? fly   Prior to hospitilization cognitive status: Alert/Oriented;No Deficits   Current cognitive status: Alert/Oriented;No Deficits   Equipment Currently Used at Home none   Do you currently have service(s) that help you manage your care at home? No   Do you take prescription medications? Yes   Do you have prescription coverage? Yes   Do you have any problems affording any of your prescribed medications? No   Is the patient taking medications as prescribed? yes   Who is going to help you get home at discharge? fly   How do you get to doctors appointments? family or friend will provide   Are you on dialysis? No   Do you take coumadin? No   Discharge Plan A Home with family   Discharge Plan B Home with family   DME Needed Upon Discharge  none   Discharge Plan discussed with: Patient   Transition of Care Barriers None   Physical Activity   On average, how many days per week do you engage in moderate to strenuous exercise (like a brisk walk)? Pt Declined   On average, how many minutes do you engage in exercise at this level? Pt Declined   Financial Resource Strain   How hard is it for you to pay for the very basics like food, housing, medical care, and heating? Pt Declined   Housing Stability   In the last 12 months, was there a time when you were not able to pay the mortgage or rent on time? Pt Declined   At any time in the past 12 months, were you homeless or living in a shelter (including now)? Pt Declined   Transportation Needs   Has the lack of transportation kept you from medical appointments, meetings, work or from getting things needed for daily living? Patient declined   Food Insecurity   Within the  past 12 months, you worried that your food would run out before you got the money to buy more. Pt Declined   Within the past 12 months, the food you bought just didn't last and you didn't have money to get more. Pt Declined   Stress   Do you feel stress - tense, restless, nervous, or anxious, or unable to sleep at night because your mind is troubled all the time - these days? Pt Declined   Social Isolation   How often do you feel lonely or isolated from those around you?  Patient declined   Alcohol Use   Q1: How often do you have a drink containing alcohol? Pt Declined   Q2: How many drinks containing alcohol do you have on a typical day when you are drinking? Pt Declined   Q3: How often do you have six or more drinks on one occasion? Pt Declined   Utilities   In the past 12 months has the electric, gas, oil, or water company threatened to shut off services in your home? Pt Declined   Health Literacy   How often do you need to have someone help you when you read instructions, pamphlets, or other written material from your doctor or pharmacy? Patient declines to respond        No

## 2024-10-08 ENCOUNTER — OFFICE (OUTPATIENT)
Age: 74
Setting detail: OPHTHALMOLOGY
End: 2024-10-08
Payer: MEDICARE

## 2024-10-08 DIAGNOSIS — H25.13: ICD-10-CM

## 2024-10-08 DIAGNOSIS — H40.013: ICD-10-CM

## 2024-10-08 DIAGNOSIS — H02.403: ICD-10-CM

## 2024-10-08 DIAGNOSIS — H43.393: ICD-10-CM

## 2024-10-08 DIAGNOSIS — H35.372: ICD-10-CM

## 2024-10-08 PROCEDURE — 92014 COMPRE OPH EXAM EST PT 1/>: CPT | Performed by: OPHTHALMOLOGY

## 2024-10-08 PROCEDURE — 92083 EXTENDED VISUAL FIELD XM: CPT | Performed by: OPHTHALMOLOGY

## 2024-10-08 ASSESSMENT — REFRACTION_MANIFEST
OS_ADD: +2.75
OS_VA1: 20/40
OD_CYLINDER: -1.25
OD_VA1: 20/40
OS_VA2: 20/30(J2)
OS_VA2: 20/30(J2)
OS_CYLINDER: -0.25
OS_AXIS: 045
OD_VA1: 20/40
OS_SPHERE: -5.00
OS_SPHERE: -5.00
OS_CYLINDER: -0.25
OD_AXIS: 115
OD_VA2: 20/30(J2)
OD_ADD: +2.75
OD_AXIS: 116
OD_ADD: +2.75
OD_SPHERE: -5.00
OD_CYLINDER: -1.25
OS_AXIS: 045
OD_VA2: 20/30(J2)
OD_SPHERE: -5.00
OS_ADD: +2.75
OS_VA1: 20/40

## 2024-10-08 ASSESSMENT — REFRACTION_CURRENTRX
OD_ADD: +2.75
OS_AXIS: 084
OS_SPHERE: -5.25
OD_AXIS: 111
OD_CYLINDER: -1.00
OS_OVR_VA: 20/
OS_CYLINDER: -0.50
OS_AXIS: 055
OS_SPHERE: -5.00
OD_VPRISM_DIRECTION: PROGS
OD_SPHERE: -4.25
OS_VPRISM_DIRECTION: PROGS
OS_VPRISM_DIRECTION: PROGS
OD_SPHERE: -5.25
OS_ADD: +2.50
OD_ADD: +2.50
OS_VPRISM_DIRECTION: PROGS
OS_ADD: +2.50
OS_OVR_VA: 20/
OS_CYLINDER: -0.25
OS_CYLINDER: -0.50
OS_CYLINDER: -0.75
OD_ADD: +2.50
OD_SPHERE: -5.00
OD_OVR_VA: 20/
OS_SPHERE: -4.25
OD_SPHERE: -5.50
OD_AXIS: 121
OS_AXIS: 030
OD_CYLINDER: -1.25
OD_OVR_VA: 20/
OS_AXIS: 22
OD_OVR_VA: 20/
OD_AXIS: 117
OD_VPRISM_DIRECTION: PROGS
OD_CYLINDER: -1.25
OD_AXIS: 096
OS_ADD: +2.50
OD_CYLINDER: -1.25
OS_SPHERE: -5.25
OD_ADD: +2.50
OS_OVR_VA: 20/
OS_ADD: +2.75
OD_VPRISM_DIRECTION: PROGS

## 2024-10-08 ASSESSMENT — LID POSITION - PTOSIS
OS_PTOSIS: LUL 2+
OD_PTOSIS: RUL 3+

## 2024-10-08 ASSESSMENT — REFRACTION_AUTOREFRACTION
OS_SPHERE: +5.50
OD_CYLINDER: -2.50
OS_AXIS: 032
OD_SPHERE: -5.25
OS_CYLINDER: -0.50

## 2024-10-08 ASSESSMENT — PACHYMETRY
OS_CT_UM: 519
OD_CT_CORRECTION: 3
OD_CT_UM: 509
OS_CT_CORRECTION: 2

## 2024-10-08 ASSESSMENT — CONFRONTATIONAL VISUAL FIELD TEST (CVF)
OD_FINDINGS: FULL
OS_FINDINGS: FULL

## 2024-10-08 ASSESSMENT — KERATOMETRY
OD_AXISANGLE_DEGREES: 053
METHOD_AUTO_MANUAL: AUTO
OS_K1POWER_DIOPTERS: 43.25
OD_K2POWER_DIOPTERS: 45.75
OS_AXISANGLE_DEGREES: 142
OD_K1POWER_DIOPTERS: 45.00
OS_K2POWER_DIOPTERS: 44.50

## 2024-10-08 ASSESSMENT — TONOMETRY
OS_IOP_MMHG: 11
OD_IOP_MMHG: 11

## 2024-10-08 ASSESSMENT — VISUAL ACUITY
OD_BCVA: 20/30+2
OS_BCVA: 20/30+2

## 2025-07-30 ENCOUNTER — EMERGENCY (EMERGENCY)
Facility: HOSPITAL | Age: 75
LOS: 1 days | End: 2025-07-30
Attending: EMERGENCY MEDICINE
Payer: MEDICARE

## 2025-07-30 VITALS
SYSTOLIC BLOOD PRESSURE: 132 MMHG | OXYGEN SATURATION: 95 % | HEART RATE: 57 BPM | TEMPERATURE: 97 F | RESPIRATION RATE: 20 BRPM | DIASTOLIC BLOOD PRESSURE: 78 MMHG | HEIGHT: 64 IN | WEIGHT: 145.06 LBS

## 2025-07-30 VITALS
TEMPERATURE: 98 F | SYSTOLIC BLOOD PRESSURE: 147 MMHG | HEART RATE: 61 BPM | DIASTOLIC BLOOD PRESSURE: 68 MMHG | OXYGEN SATURATION: 94 % | RESPIRATION RATE: 18 BRPM

## 2025-07-30 PROCEDURE — 72170 X-RAY EXAM OF PELVIS: CPT | Mod: 26

## 2025-07-30 PROCEDURE — 72170 X-RAY EXAM OF PELVIS: CPT

## 2025-07-30 PROCEDURE — 70450 CT HEAD/BRAIN W/O DYE: CPT

## 2025-07-30 PROCEDURE — 70450 CT HEAD/BRAIN W/O DYE: CPT | Mod: 26

## 2025-07-30 PROCEDURE — 99284 EMERGENCY DEPT VISIT MOD MDM: CPT | Mod: FS

## 2025-07-30 PROCEDURE — 99284 EMERGENCY DEPT VISIT MOD MDM: CPT | Mod: 25
